# Patient Record
Sex: FEMALE | Race: BLACK OR AFRICAN AMERICAN | NOT HISPANIC OR LATINO | Employment: STUDENT | URBAN - METROPOLITAN AREA
[De-identification: names, ages, dates, MRNs, and addresses within clinical notes are randomized per-mention and may not be internally consistent; named-entity substitution may affect disease eponyms.]

---

## 2017-01-09 ENCOUNTER — PROCEDURE VISIT (OUTPATIENT)
Dept: OBSTETRICS AND GYNECOLOGY | Facility: CLINIC | Age: 22
End: 2017-01-09
Payer: MEDICAID

## 2017-01-09 ENCOUNTER — INITIAL PRENATAL (OUTPATIENT)
Dept: OBSTETRICS AND GYNECOLOGY | Facility: CLINIC | Age: 22
End: 2017-01-09
Payer: MEDICAID

## 2017-01-09 VITALS
SYSTOLIC BLOOD PRESSURE: 124 MMHG | WEIGHT: 270.31 LBS | DIASTOLIC BLOOD PRESSURE: 82 MMHG | BODY MASS INDEX: 43.62 KG/M2

## 2017-01-09 DIAGNOSIS — Z34.01 ENCOUNTER FOR SUPERVISION OF NORMAL FIRST PREGNANCY IN FIRST TRIMESTER: ICD-10-CM

## 2017-01-09 DIAGNOSIS — Z32.01 POSITIVE PREGNANCY TEST: ICD-10-CM

## 2017-01-09 DIAGNOSIS — N92.6 IRREGULAR MENSES: ICD-10-CM

## 2017-01-09 DIAGNOSIS — O21.9 NAUSEA/VOMITING IN PREGNANCY: ICD-10-CM

## 2017-01-09 DIAGNOSIS — O99.211 OBESITY COMPLICATING PREGNANCY, FIRST TRIMESTER: Primary | ICD-10-CM

## 2017-01-09 DIAGNOSIS — N94.9 FEMALE GENITAL SYMPTOMS: ICD-10-CM

## 2017-01-09 PROCEDURE — 99999 PR PBB SHADOW E&M-EST. PATIENT-LVL II: CPT | Mod: PBBFAC,,, | Performed by: ADVANCED PRACTICE MIDWIFE

## 2017-01-09 PROCEDURE — 99213 OFFICE O/P EST LOW 20 MIN: CPT | Mod: TH,S$PBB,, | Performed by: ADVANCED PRACTICE MIDWIFE

## 2017-01-09 PROCEDURE — 99212 OFFICE O/P EST SF 10 MIN: CPT | Mod: PBBFAC,PO | Performed by: ADVANCED PRACTICE MIDWIFE

## 2017-01-09 PROCEDURE — 76801 OB US < 14 WKS SINGLE FETUS: CPT | Mod: 26,S$PBB,, | Performed by: OBSTETRICS & GYNECOLOGY

## 2017-01-09 PROCEDURE — 87591 N.GONORRHOEAE DNA AMP PROB: CPT

## 2017-01-09 RX ORDER — PROMETHAZINE HYDROCHLORIDE 25 MG/1
25 TABLET ORAL EVERY 6 HOURS PRN
Qty: 30 TABLET | Refills: 0 | Status: SHIPPED | OUTPATIENT
Start: 2017-01-09 | End: 2017-03-29

## 2017-01-09 RX ORDER — ONDANSETRON 4 MG/1
4 TABLET, FILM COATED ORAL EVERY 12 HOURS PRN
Qty: 60 TABLET | Refills: 0 | Status: ON HOLD | OUTPATIENT
Start: 2017-01-09 | End: 2017-05-31 | Stop reason: HOSPADM

## 2017-01-09 RX ORDER — PROMETHAZINE HYDROCHLORIDE AND DEXTROMETHORPHAN HYDROBROMIDE 6.25; 15 MG/5ML; MG/5ML
5 SYRUP ORAL
COMMUNITY
Start: 2015-03-26 | End: 2017-02-13

## 2017-01-09 NOTE — PROGRESS NOTES
NOB s=d oriented to practice  Weight goal of 25 pounds set  Labs and sono reviewed  genprobe collected  reflll given for phenergan and zofran

## 2017-01-12 LAB
C TRACH DNA SPEC QL NAA+PROBE: NEGATIVE
N GONORRHOEA DNA SPEC QL NAA+PROBE: NEGATIVE

## 2017-02-06 ENCOUNTER — PATIENT MESSAGE (OUTPATIENT)
Dept: OBSTETRICS AND GYNECOLOGY | Facility: CLINIC | Age: 22
End: 2017-02-06

## 2017-02-13 ENCOUNTER — ROUTINE PRENATAL (OUTPATIENT)
Dept: OBSTETRICS AND GYNECOLOGY | Facility: CLINIC | Age: 22
End: 2017-02-13
Payer: MEDICAID

## 2017-02-13 VITALS
SYSTOLIC BLOOD PRESSURE: 120 MMHG | BODY MASS INDEX: 41.38 KG/M2 | WEIGHT: 256.38 LBS | DIASTOLIC BLOOD PRESSURE: 78 MMHG

## 2017-02-13 DIAGNOSIS — Z3A.14 14 WEEKS GESTATION OF PREGNANCY: Primary | ICD-10-CM

## 2017-02-13 DIAGNOSIS — O21.9 NAUSEA/VOMITING IN PREGNANCY: ICD-10-CM

## 2017-02-13 PROCEDURE — 99212 OFFICE O/P EST SF 10 MIN: CPT | Mod: PBBFAC,PO | Performed by: NURSE PRACTITIONER

## 2017-02-13 PROCEDURE — 99999 PR PBB SHADOW E&M-EST. PATIENT-LVL II: CPT | Mod: PBBFAC,,, | Performed by: NURSE PRACTITIONER

## 2017-02-13 PROCEDURE — 99214 OFFICE O/P EST MOD 30 MIN: CPT | Mod: TH,S$PBB,, | Performed by: NURSE PRACTITIONER

## 2017-02-13 NOTE — PROGRESS NOTES
For some reason not seeing her midwife will get her reappted, went to ER for nausea and vomiting. Has lost weight but with her obesity has tolerated it.  Is in great sprits, doing good on the zofran.  Urine with trace leukocytes, eating ice, encouraged to try and eat some solid foods and liquids. In with midwife asap.

## 2017-02-14 ENCOUNTER — ROUTINE PRENATAL (OUTPATIENT)
Dept: OBSTETRICS AND GYNECOLOGY | Facility: CLINIC | Age: 22
End: 2017-02-14
Payer: MEDICAID

## 2017-02-14 VITALS
BODY MASS INDEX: 41.13 KG/M2 | WEIGHT: 254.88 LBS | SYSTOLIC BLOOD PRESSURE: 118 MMHG | DIASTOLIC BLOOD PRESSURE: 68 MMHG

## 2017-02-14 DIAGNOSIS — K11.7 PTYALISM: ICD-10-CM

## 2017-02-14 DIAGNOSIS — Z34.80 SUPERVISION OF OTHER NORMAL PREGNANCY: Primary | ICD-10-CM

## 2017-02-14 DIAGNOSIS — O21.9 NAUSEA AND VOMITING IN PREGNANCY PRIOR TO 22 WEEKS GESTATION: ICD-10-CM

## 2017-02-14 PROCEDURE — 99999 PR PBB SHADOW E&M-EST. PATIENT-LVL II: CPT | Mod: PBBFAC,,, | Performed by: ADVANCED PRACTICE MIDWIFE

## 2017-02-14 PROCEDURE — 99212 OFFICE O/P EST SF 10 MIN: CPT | Mod: PBBFAC,PO | Performed by: ADVANCED PRACTICE MIDWIFE

## 2017-02-14 PROCEDURE — 99212 OFFICE O/P EST SF 10 MIN: CPT | Mod: TH,S$PBB,, | Performed by: ADVANCED PRACTICE MIDWIFE

## 2017-02-14 RX ORDER — ONDANSETRON 8 MG/1
8 TABLET, ORALLY DISINTEGRATING ORAL EVERY 12 HOURS PRN
Qty: 20 TABLET | Refills: 1 | Status: ON HOLD | OUTPATIENT
Start: 2017-02-14 | End: 2017-05-31 | Stop reason: HOSPADM

## 2017-02-14 RX ORDER — ONDANSETRON 8 MG/1
TABLET, ORALLY DISINTEGRATING ORAL
Refills: 0 | COMMUNITY
Start: 2017-02-08 | End: 2017-02-14

## 2017-02-14 NOTE — MR AVS SNAPSHOT
[facility]  [address] [facility] [address]  [place] [address]  Phone: [phone]  Fax: [phone]                  Juliet Jalloh   2017 3:00 PM   Routine Prenatal    Description:  Female : 1995   Provider:  Lizbeth Thomson CNM   Department:  [facility]           Reason for Visit     Routine Prenatal Visit           Diagnoses this Visit        Comments    Supervision of other normal pregnancy    -  Primary     Ptyalism         Nausea and vomiting in pregnancy prior to 22 weeks gestation                To Do List           Future Appointments        Provider Department Dept Phone    2017 2:15 PM Lizbeth Thomson CNM [facility]/ -[phone]    3/14/2017 1:45 PM Lizbeth Thomson CNM [facility]/ -[phone]      Goals (5 Years of Data)     None      Follow-Up and Disposition     Return in about 1 week (around 2017).    Follow-up and Disposition History       These Medications        Disp Refills Start End    ondansetron (ZOFRAN-ODT) 8 MG TbDL 20 tablet 1 2017     Take 1 tablet (8 mg total) by mouth every 12 (twelve) hours as needed. - Oral    [place] [facility] [address] [address] [facility] Ph #: [phone]       prenatal vit#103-iron-FA () 27 mg iron- 1 mg Tab 30 tablet 9 2017    Take 1 tablet by mouth once daily. - Oral    [place] [facility] [address] [address] [facility] [address] [facility] Ph #: [phone]       Notes to Pharmacy: Any generic may be used      [facility]     [facility] Care [facility] -  Assistance  Registered nurses in the [facility] Call Center provide clinical advisement, health education, appointment booking, and other advisory services.  Call for this free service at [phone].             Medications           Message regarding Medications     Verify the changes and/or additions to your medication regime listed below are the same as  discussed with your clinician today.  If any of these changes or additions are incorrect, please notify your healthcare provider.        START taking these NEW medications        Refills    ondansetron (ZOFRAN-ODT) 8 MG TbDL 1    Sig: Take 1 tablet (8 mg total) by mouth every 12 (twelve) hours as needed.    Class: Normal    Route: Oral    prenatal vit#103-iron-FA () 27 mg iron- 1 mg Tab 9    Sig: Take 1 tablet by mouth once daily.    Class: Normal    Route: Oral           Verify that the below list of medications is an accurate representation of the medications you are currently taking.  If none reported, the list may be blank. If incorrect, please contact your healthcare provider. Carry this list with you in case of emergency.           Current Medications     ondansetron (ZOFRAN) 4 MG tablet Take 1 tablet (4 mg total) by mouth every 12 (twelve) hours as needed.    ondansetron (ZOFRAN-ODT) 8 MG TbDL Take 1 tablet (8 mg total) by mouth every 12 (twelve) hours as needed.    prenatal vit#103-iron-FA () 27 mg iron- 1 mg Tab Take 1 tablet by mouth once daily.    promethazine (PHENERGAN) 25 MG tablet Take 1 tablet (25 mg total) by mouth every 6 (six) hours as needed for Nausea.           Clinical Reference Information           Prenatal Vitals     Enc. Date GA Prenatal Vitals Prenatal Pulse Pain Level Urine Albumin/Glucose Edema Presentation Dilation/Effacement/Station    2/14/17 14w1d 118/68 / 115.6 kg (254 lb 13.6 oz) 14 cm          2/13/17 14w0d 120/78 / 116.3 kg (256 lb 6.3 oz)  / 145  0 Trace / Negative       1/9/17 9w0d 124/82 / 122.6 kg (270 lb 4.5 oz) 9 cm / us 162 / Absent            Your Vitals Were     BP Weight Last Period BMI       118/68 115.6 kg (254 lb 13.6 oz) 11/07/2016 41.13 kg/m2       Allergies as of 2/14/2017     No Known Allergies      Immunizations Administered on Date of Encounter - 2/14/2017     None      Language Assistance Services     ATTENTION: Language assistance services are  available, free of charge. Please call [phone].      ATENCIÓN: Si habla gian, tiene a nieto disposición servicios gratuitos de asistencia lingüística. Llame al [phone].     CHÚ Ý: N?u b?n nói Ti?ng Vi?t, có các d?ch v? h? tr? ngôn ng? mi?n phí dành cho b?n. G?i s? [phone].         [facility] complies with applicable Federal civil rights laws and does not discriminate on the basis of race, color, national origin, age, disability, or sex.

## 2017-02-14 NOTE — PROGRESS NOTES
zofran 8 mg OTD helping encouraged to stop spitting and swallow saliva  26 pound weight loss  Encouraged to eat small frequent meals  Considering move to [place]

## 2017-03-21 ENCOUNTER — TELEPHONE (OUTPATIENT)
Dept: OBSTETRICS AND GYNECOLOGY | Facility: CLINIC | Age: 22
End: 2017-03-21

## 2017-03-21 DIAGNOSIS — Z34.92 NORMAL PREGNANCY IN SECOND TRIMESTER: Primary | ICD-10-CM

## 2017-03-21 NOTE — TELEPHONE ENCOUNTER
appt made for her 20 wks scan and appt with julse on 04/03/2017 at 730am at [facility]   tdf

## 2017-03-21 NOTE — TELEPHONE ENCOUNTER
----- Message from Lizbeth Liz sent at 3/21/2017 11:47 AM CDT -----  Contact: Pt   States she is calling rg wanting to get her ultrasound to see the gender of her baby and wants to schedule same time as her appt and can be reached at [phone]/fide/dbw

## 2017-03-28 ENCOUNTER — HOSPITAL ENCOUNTER (EMERGENCY)
Facility: HOSPITAL | Age: 22
Discharge: HOME | End: 2017-03-29
Payer: MEDICAID

## 2017-03-28 DIAGNOSIS — O21.1 HYPEREMESIS GRAVIDARUM BEFORE END OF 22 WEEK GESTATION, DEHYDRATION: Primary | ICD-10-CM

## 2017-03-28 LAB
ALBUMIN SERPL BCP-MCNC: 3 G/DL
ALP SERPL-CCNC: 110 U/L
ALT SERPL W/O P-5'-P-CCNC: 14 U/L
ANION GAP SERPL CALC-SCNC: 9 MMOL/L
AST SERPL-CCNC: 15 U/L
BILIRUB SERPL-MCNC: 0.3 MG/DL
BUN SERPL-MCNC: 4 MG/DL
CALCIUM SERPL-MCNC: 8.8 MG/DL
CHLORIDE SERPL-SCNC: 106 MMOL/L
CO2 SERPL-SCNC: 23 MMOL/L
CREAT SERPL-MCNC: 0.7 MG/DL
EST. GFR  (AFRICAN AMERICAN): >60 ML/MIN/1.73 M^2
EST. GFR  (NON AFRICAN AMERICAN): >60 ML/MIN/1.73 M^2
GLUCOSE SERPL-MCNC: 86 MG/DL
POTASSIUM SERPL-SCNC: 3.6 MMOL/L
PROT SERPL-MCNC: 7 G/DL
SODIUM SERPL-SCNC: 138 MMOL/L

## 2017-03-28 PROCEDURE — 99283 EMERGENCY DEPT VISIT LOW MDM: CPT | Mod: 25

## 2017-03-28 PROCEDURE — 80053 COMPREHEN METABOLIC PANEL: CPT

## 2017-03-28 PROCEDURE — 85025 COMPLETE CBC W/AUTO DIFF WBC: CPT

## 2017-03-28 PROCEDURE — 25000003 PHARM REV CODE 250: Performed by: NURSE PRACTITIONER

## 2017-03-28 PROCEDURE — 96365 THER/PROPH/DIAG IV INF INIT: CPT

## 2017-03-28 PROCEDURE — 63600175 PHARM REV CODE 636 W HCPCS: Performed by: NURSE PRACTITIONER

## 2017-03-28 RX ORDER — SCOLOPAMINE TRANSDERMAL SYSTEM 1 MG/1
1 PATCH, EXTENDED RELEASE TRANSDERMAL
Status: COMPLETED | OUTPATIENT
Start: 2017-03-28 | End: 2017-03-28

## 2017-03-28 RX ADMIN — SODIUM CHLORIDE 1000 ML: 0.9 INJECTION, SOLUTION INTRAVENOUS at 11:03

## 2017-03-28 RX ADMIN — SCOPALAMINE 1.5 MG: 1 PATCH, EXTENDED RELEASE TRANSDERMAL at 11:03

## 2017-03-28 RX ADMIN — PROMETHAZINE HYDROCHLORIDE 25 MG: 25 INJECTION, SOLUTION INTRAMUSCULAR; INTRAVENOUS at 11:03

## 2017-03-28 NOTE — ED AVS SNAPSHOT
[facility]  [address] [facility] [address]               Juliet Jalloh   3/28/2017 10:31 PM   ED    Description:  Female : 1995   Department:  [facility]            Your Care was Coordinated By:     Provider Role From To    Dhruv Hawley NP Nurse Practitioner 17 0004 --      Reason for Visit     Emesis           Diagnoses this Visit        Comments    Hyperemesis gravidarum before end of 22 week gestation, dehydration    -  Primary       ED Disposition     None           To Do List           Follow-up Information     Schedule an appointment as soon as possible for a visit with [facility].    Specialty:  Obstetrics and Gynecology    Contact information:    [address] [facility][1] [address] [phone]    Additional information:    (off [facility]) 3rd floor       These Medications        Disp Refills Start End    promethazine (PHENERGAN) 25 MG tablet 30 tablet 0 3/29/2017     Take 1 tablet (25 mg total) by mouth every 6 (six) hours as needed for Nausea. - Oral    [facility] [address] [place] [address] [address] AT [facility] #: [phone]       promethazine (PHENERGAN) 25 MG suppository 10 suppository 0 3/29/2017     Place 1 suppository (25 mg total) rectally every 6 (six) hours as needed for Nausea. - Rectal    [facility] [address] AT [facility] Ph #: [phone]       scopolamine (TRANSDERM-SCOP) 1.5 mg (1 mg over 3 days) 3 patch 0 3/29/2017 2017    Place 1 patch (1.5 mg total) onto the skin Every 3 (three) days. - Transdermal    [facility] [facility] [address] [facility] Ph #: [phone]         [facility]     [facility] On Call Nurse Care Line -  Assistance  Registered nurses in the [facility] provide clinical advisement, health  education, appointment booking, and other advisory services.  Call for this free service at [phone].             Medications           Message regarding Medications     Verify the changes and/or additions to your medication regime listed below are the same as discussed with your clinician today.  If any of these changes or additions are incorrect, please notify your healthcare provider.        START taking these NEW medications        Refills    promethazine (PHENERGAN) 25 MG suppository 0    Sig: Place 1 suppository (25 mg total) rectally every 6 (six) hours as needed for Nausea.    Class: Print    Route: Rectal    scopolamine (TRANSDERM-SCOP) 1.5 mg (1 mg over 3 days) 0    Sig: Place 1 patch (1.5 mg total) onto the skin Every 3 (three) days.    Class: Print    Route: Transdermal      These medications were administered today        Dose Freq    sodium chloride 0.9% bolus 1,000 mL 1,000 mL Once    Sig: Inject 1,000 mLs into the vein once.    Class: Normal    Route: Intravenous    promethazine (PHENERGAN) 25 mg in dextrose 5 % 50 mL IVPB 25 mg ED 1 Time    Sig: Inject 25 mg into the vein ED 1 Time.    Class: Normal    Route: Intravenous    scopolamine 1.5 mg (1 mg over 3 days) 1.5 mg 1 patch ED 1 Time    Sig: Place 1 patch (1.5 mg total) onto the skin ED 1 Time.    Class: Normal    Route: Transdermal           Verify that the below list of medications is an accurate representation of the medications you are currently taking.  If none reported, the list may be blank. If incorrect, please contact your healthcare provider. Carry this list with you in case of emergency.           Current Medications     ondansetron (ZOFRAN) 4 MG tablet Take 1 tablet (4 mg total) by mouth every 12 (twelve) hours as needed.    ondansetron (ZOFRAN-ODT) 8 MG TbDL Take 1 tablet (8 mg total) by mouth every 12 (twelve) hours as needed.    prenatal vit#103-iron-FA () 27 mg iron- 1 mg Tab Take 1 tablet by mouth once daily.     "promethazine (PHENERGAN) 25 MG suppository Place 1 suppository (25 mg total) rectally every 6 (six) hours as needed for Nausea.    promethazine (PHENERGAN) 25 MG tablet Take 1 tablet (25 mg total) by mouth every 6 (six) hours as needed for Nausea.    scopolamine (TRANSDERM-SCOP) 1.5 mg (1 mg over 3 days) Place 1 patch (1.5 mg total) onto the skin Every 3 (three) days.           Clinical Reference Information           Your Vitals Were     BP Pulse Temp Resp Height Weight    114/66 76 98.1 °F (36.7 °C) 16 5' 6" (1.676 m) 113.4 kg (250 lb)    Last Period SpO2 BMI          11/07/2016 100% 40.35 kg/m2        Allergies as of 3/29/2017     No Known Allergies      Immunizations Administered on Date of Encounter - 3/29/2017     None      ED Micro, Lab, POCT     Start Ordered       Status Ordering Provider    03/28/17 2235 03/28/17 2235  Comprehensive metabolic panel  STAT      Final result     03/28/17 2235 03/28/17 2235  CBC auto differential  STAT      Final result     03/28/17 2235 03/28/17 2235  Urinalysis - Clean Catch  STAT      Final result     03/28/17 2235 03/28/17 2235  Urinalysis Microscopic  Once      Final result       ED Imaging Orders     None        Discharge Instructions         Hyperemesis Gravidarum  Hyperemesis gravidarum is a severe form of morning sickness that can affect some women during pregnancy. It may develop around the 5th week and last until the 16th week of pregnancy. In some women, it may last longer. Symptoms include severe nausea and vomiting. This can lead to problems such as as weight loss and dehydration.  It is not clear what causes hyperemesis gravidarum. It may be due to rising hormone levels early in the pregnancy. It can be a serious threat to mother and fetus, if symptoms are severe. Therefore, follow the advice below carefully. If symptoms are not controlled by home measures, a hospital stay may be needed. IV (intravenous) fluids and medicines may be given.  Home " care  · Diet  ¨ Keep a log of the foods you eat and how they affect your symptoms. Avoid foods that trigger your symptoms.  ¨ Eat frequent small meals throughout the day rather than three large meals. This can help keep the stomach from being empty, which can make nausea worse.  ¨ Choose foods that are high in carbohydrates. Eating foods high in protein may also help. Limit greasy or spicy foods.  ¨ Before getting out of bed in the morning, try eating crackers or dry toast. This may help settle your stomach.  ¨ Drink cold, clear liquids. Drinking small amounts of liquids with electrolytes, such as sports drinks may help as well.  · Medicine  ¨ If needed, your healthcare provider may prescribe certain medicines to help relieve nausea and vomiting. Vitamin B6 and caitie may also be advised. Dont try any over-the-counter medicines or home remedies without talking to your provider first.  Follow-up care  Follow up with your healthcare provider, or as advised.  When to seek medical advice  Call your healthcare provider right away if any of these occur:  · Signs of dehydration (dry mouth, extreme thirst, dark urine or little urine output, dizziness, weakness, or fainting)  · Vomiting that wont stop  · Inability to keep down liquids  · Frequent diarrhea  · Weight loss or no weight gain over a 2-week period  · Severe constant pain in the lower right abdomen  · Fever of 100.4°F (38°C) or higher, or as directed by your provider  Date Last Reviewed: 8/20/2015  © 1566-5913 [employer]. [address] [place] [address], [place] [address]. All rights reserved. This information is not intended as a substitute for professional medical care. Always follow your healthcare professional's instructions.          Severe Morning Sickness (Hyperemesis Gravidarum)    Nausea and vomiting are common during pregnancy. It is often called morning sickness, but it can happen at any time of day. But severe nausea and vomiting that doesnt  let up is not normal. Dehydration and weight loss can result. This can be dangerous for the mother and baby. Hyperemesis gravidarum (HEG) is the medical term for severe nausea and vomiting during pregnancy, and it results in weight loss. If you have it, your healthcare provider can take steps to keep you and your baby safe. He or she can also help you find relief.   What are the symptoms of severe morning sickness?  Call your healthcare provider right away if you suspect that you have severe morning symptoms. The symptoms include:  · Inability to keep down liquids  · Nausea that is severe and lasts beyond the first few months  · Inability to empty the bladder   · Urine that is dark and concentrated   · Fainting spells  What causes severe morning symptoms?  Nausea and vomiting during pregnancy are thought to be due to an increase in certain hormone levels. It is not clear what causes severe morning sickness, but it may be more likely in women carrying twins or more. Your healthcare provider will do some tests to rule out certain health conditions that may lead to severe nausea and vomiting.  Getting relief from morning sickness  To help combat nausea, eat small amounts often. This helps prevent the stomach from being empty, which can make nausea worse. Choose dry foods such as crackers. Try sipping cold, clear drinks. And ask your healthcare provider about taking vitamin B6 or caitie to help ease nausea. Your provider may recommend that you try vitamin B6 and a medicine called doxylamine to relieve the nausea. In some cases, alternative treatments such as acupuncture are effective in helping managing nausea during pregnancy.  Treating severe morning sickness  The focus of treatment for severe morning sickness is to relieve symptoms and prevent weight loss and dehydration. If you are dehydrated or losing weight, steps are needed to protect you and your baby. You will most likely be admitted to the hospital for at  least a short time. There, you can be given IV fluids to rehydrate you. You may also be prescribed medicines that relieve nausea. In very severe cases, a longer hospitalization may be needed. IV nutrition or tube feeding will then be used. If this becomes necessary, your healthcare provider can tell you more.  Recovery and follow-up  With treatment, severe morning sickness can be managed. Follow up with your healthcare provider to be sure you are keeping down fluids and gaining a healthy amount of weight.  When to seek medical care  Contact your healthcare provider right away if you have any of the following:  · Signs of dehydration, including extreme thirst, headache, little urine, very dark urine, or a dry, sticky mouth.  · Weight loss  · Dizziness or fainting  · Racing or pounding heart  · Blood in your vomit   Date Last Reviewed: 5/1/2016  © 1217-0718 [employer]. [address] [place] [address]. All rights reserved. This information is not intended as a substitute for professional medical care. Always follow your healthcare professional's instructions.          Your Scheduled Appointments     Apr 03, 2017  7:30 AM CDT   Ultrasound with ULTRASOUND, OB-GYN KUNAL   [facility] ([facility])    [address] [place] [id number] [phone]            Apr 03, 2017  8:15 AM CDT   Routine Prenatal Visit with [facility] - [facility]/ [facility] ([facility])    [address] [place] [address] [phone]               [facility]  complies with applicable Federal civil rights laws and does not discriminate on the basis of race, color, national origin, age, disability, or sex.        Language Assistance Services     ATTENTION: Language assistance services are available, free of charge. Please call [phone].      ATENCIÓN: Si habla español, tiene a nieto disposición servicios gratuitos de asistencia lingüística. Llame al [phone].     GITA Ý: N?u b?n nói  Ti?ng Vi?t, có các d?ch v? h? tr? ngôn ng? mi?n phí dành cho b?n. G?i s? [phone].

## 2017-03-29 VITALS
BODY MASS INDEX: 40.18 KG/M2 | HEART RATE: 68 BPM | TEMPERATURE: 98 F | DIASTOLIC BLOOD PRESSURE: 64 MMHG | SYSTOLIC BLOOD PRESSURE: 112 MMHG | WEIGHT: 250 LBS | HEIGHT: 66 IN | OXYGEN SATURATION: 100 % | RESPIRATION RATE: 16 BRPM

## 2017-03-29 LAB
BACTERIA #/AREA URNS HPF: ABNORMAL /HPF
BASOPHILS # BLD AUTO: 0.03 K/UL
BASOPHILS NFR BLD: 0.5 %
BILIRUB UR QL STRIP: ABNORMAL
CLARITY UR: CLEAR
COLOR UR: YELLOW
DIFFERENTIAL METHOD: ABNORMAL
EOSINOPHIL # BLD AUTO: 0.2 K/UL
EOSINOPHIL NFR BLD: 3.5 %
ERYTHROCYTE [DISTWIDTH] IN BLOOD BY AUTOMATED COUNT: 12.8 %
GLUCOSE UR QL STRIP: NEGATIVE
HCT VFR BLD AUTO: 40 %
HGB BLD-MCNC: 13.9 G/DL
HGB UR QL STRIP: NEGATIVE
KETONES UR QL STRIP: ABNORMAL
LEUKOCYTE ESTERASE UR QL STRIP: ABNORMAL
LYMPHOCYTES # BLD AUTO: 2 K/UL
LYMPHOCYTES NFR BLD: 33.7 %
MCH RBC QN AUTO: 31.1 PG
MCHC RBC AUTO-ENTMCNC: 34.8 %
MCV RBC AUTO: 90 FL
MICROSCOPIC COMMENT: ABNORMAL
MONOCYTES # BLD AUTO: 0.6 K/UL
MONOCYTES NFR BLD: 9.6 %
NEUTROPHILS # BLD AUTO: 3.1 K/UL
NEUTROPHILS NFR BLD: 52.7 %
NITRITE UR QL STRIP: NEGATIVE
PH UR STRIP: 6 [PH] (ref 5–8)
PLATELET # BLD AUTO: 230 K/UL
PMV BLD AUTO: 10.1 FL
PROT UR QL STRIP: ABNORMAL
RBC # BLD AUTO: 4.47 M/UL
RBC #/AREA URNS HPF: 0 /HPF (ref 0–4)
SP GR UR STRIP: 1.02 (ref 1–1.03)
SQUAMOUS #/AREA URNS HPF: 28 /HPF
URN SPEC COLLECT METH UR: ABNORMAL
UROBILINOGEN UR STRIP-ACNC: ABNORMAL EU/DL
WBC # BLD AUTO: 5.96 K/UL
WBC #/AREA URNS HPF: 3 /HPF (ref 0–5)
YEAST URNS QL MICRO: ABNORMAL

## 2017-03-29 PROCEDURE — 81000 URINALYSIS NONAUTO W/SCOPE: CPT

## 2017-03-29 RX ORDER — SCOLOPAMINE TRANSDERMAL SYSTEM 1 MG/1
1 PATCH, EXTENDED RELEASE TRANSDERMAL
Qty: 3 PATCH | Refills: 0 | Status: SHIPPED | OUTPATIENT
Start: 2017-03-29 | End: 2017-04-05

## 2017-03-29 RX ORDER — PROMETHAZINE HYDROCHLORIDE 25 MG/1
25 TABLET ORAL EVERY 6 HOURS PRN
Qty: 30 TABLET | Refills: 0 | Status: SHIPPED | OUTPATIENT
Start: 2017-03-29 | End: 2017-04-03 | Stop reason: SDUPTHER

## 2017-03-29 RX ORDER — PROMETHAZINE HYDROCHLORIDE 25 MG/1
25 SUPPOSITORY RECTAL EVERY 6 HOURS PRN
Qty: 10 SUPPOSITORY | Refills: 0 | Status: SHIPPED | OUTPATIENT
Start: 2017-03-29 | End: 2017-04-03 | Stop reason: SDUPTHER

## 2017-03-29 NOTE — DISCHARGE INSTRUCTIONS
Hyperemesis Gravidarum  Hyperemesis gravidarum is a severe form of morning sickness that can affect some women during pregnancy. It may develop around the 5th week and last until the 16th week of pregnancy. In some women, it may last longer. Symptoms include severe nausea and vomiting. This can lead to problems such as as weight loss and dehydration.  It is not clear what causes hyperemesis gravidarum. It may be due to rising hormone levels early in the pregnancy. It can be a serious threat to mother and fetus, if symptoms are severe. Therefore, follow the advice below carefully. If symptoms are not controlled by home measures, a hospital stay may be needed. IV (intravenous) fluids and medicines may be given.  Home care  · Diet  ¨ Keep a log of the foods you eat and how they affect your symptoms. Avoid foods that trigger your symptoms.  ¨ Eat frequent small meals throughout the day rather than three large meals. This can help keep the stomach from being empty, which can make nausea worse.  ¨ Choose foods that are high in carbohydrates. Eating foods high in protein may also help. Limit greasy or spicy foods.  ¨ Before getting out of bed in the morning, try eating crackers or dry toast. This may help settle your stomach.  ¨ Drink cold, clear liquids. Drinking small amounts of liquids with electrolytes, such as sports drinks may help as well.  · Medicine  ¨ If needed, your healthcare provider may prescribe certain medicines to help relieve nausea and vomiting. Vitamin B6 and caitie may also be advised. Dont try any over-the-counter medicines or home remedies without talking to your provider first.  Follow-up care  Follow up with your healthcare provider, or as advised.  When to seek medical advice  Call your healthcare provider right away if any of these occur:  · Signs of dehydration (dry mouth, extreme thirst, dark urine or little urine output, dizziness, weakness, or fainting)  · Vomiting that wont  stop  · Inability to keep down liquids  · Frequent diarrhea  · Weight loss or no weight gain over a 2-week period  · Severe constant pain in the lower right abdomen  · Fever of 100.4°F (38°C) or higher, or as directed by your provider  Date Last Reviewed: 8/20/2015  © 8830-6201 [employer]. [address] [place] [address], [place] [address]. All rights reserved. This information is not intended as a substitute for professional medical care. Always follow your healthcare professional's instructions.          Severe Morning Sickness (Hyperemesis Gravidarum)    Nausea and vomiting are common during pregnancy. It is often called morning sickness, but it can happen at any time of day. But severe nausea and vomiting that doesnt let up is not normal. Dehydration and weight loss can result. This can be dangerous for the mother and baby. Hyperemesis gravidarum (HEG) is the medical term for severe nausea and vomiting during pregnancy, and it results in weight loss. If you have it, your healthcare provider can take steps to keep you and your baby safe. He or she can also help you find relief.   What are the symptoms of severe morning sickness?  Call your healthcare provider right away if you suspect that you have severe morning symptoms. The symptoms include:  · Inability to keep down liquids  · Nausea that is severe and lasts beyond the first few months  · Inability to empty the bladder   · Urine that is dark and concentrated   · Fainting spells  What causes severe morning symptoms?  Nausea and vomiting during pregnancy are thought to be due to an increase in certain hormone levels. It is not clear what causes severe morning sickness, but it may be more likely in women carrying twins or more. Your healthcare provider will do some tests to rule out certain health conditions that may lead to severe nausea and vomiting.  Getting relief from morning sickness  To help combat nausea, eat small amounts often. This helps  prevent the stomach from being empty, which can make nausea worse. Choose dry foods such as crackers. Try sipping cold, clear drinks. And ask your healthcare provider about taking vitamin B6 or caitie to help ease nausea. Your provider may recommend that you try vitamin B6 and a medicine called doxylamine to relieve the nausea. In some cases, alternative treatments such as acupuncture are effective in helping managing nausea during pregnancy.  Treating severe morning sickness  The focus of treatment for severe morning sickness is to relieve symptoms and prevent weight loss and dehydration. If you are dehydrated or losing weight, steps are needed to protect you and your baby. You will most likely be admitted to the hospital for at least a short time. There, you can be given IV fluids to rehydrate you. You may also be prescribed medicines that relieve nausea. In very severe cases, a longer hospitalization may be needed. IV nutrition or tube feeding will then be used. If this becomes necessary, your healthcare provider can tell you more.  Recovery and follow-up  With treatment, severe morning sickness can be managed. Follow up with your healthcare provider to be sure you are keeping down fluids and gaining a healthy amount of weight.  When to seek medical care  Contact your healthcare provider right away if you have any of the following:  · Signs of dehydration, including extreme thirst, headache, little urine, very dark urine, or a dry, sticky mouth.  · Weight loss  · Dizziness or fainting  · Racing or pounding heart  · Blood in your vomit   Date Last Reviewed: 5/1/2016  © 9329-6658 [employer]. [address] [place] [address], [place] [address]. All rights reserved. This information is not intended as a substitute for professional medical care. Always follow your healthcare professional's instructions.

## 2017-03-29 NOTE — ED PROVIDER NOTES
SCRIBE #1 NOTE: I, Epi Lemon, am scribing for, and in the presence of, ARELIS Ramirez. I have scribed the entire note.      History      Chief Complaint   Patient presents with    Emesis     pt states she has been vomiting and 20weeks pregnant       Review of patient's allergies indicates:  No Known Allergies     HPI   HPI    3/28/2017, 10:33 PM   History obtained from the patient      History of Present Illness: Juliet Jalloh is a 21 y.o. female patient who is 20 weeks gestation presents to the Emergency Department for emesis which onset gradually yesterday. Sx are episodic and moderate in severity. Pt states she has not been able to keep anything down today. There are no mitigating or exacerbating factors noted. Associated sx include nausea.  Pt denies any fever, chills, diarrhea, abd pain, abnormal vaginal bleeding/discharge, CP, SOB, urinary sx, and all other sx at this time. No further complaints or concerns at this time.         Arrival mode: Personal vehicle     PCP: Primary Doctor No       Past Medical History:  Past Medical History:   Diagnosis Date    Abnormal Pap smear of cervix     Herpes simplex virus (HSV) infection        Past Surgical History:  History reviewed. No pertinent surgical history.      Family History:  Family History   Problem Relation Age of Onset    Diabetes Maternal Grandmother     Breast cancer Maternal Grandmother     Colon cancer Maternal Grandfather     Cancer Neg Hx        Social History:  Social History     Social History Main Topics    Smoking status: Never Smoker    Smokeless tobacco: Never Used    Alcohol use No      Comment: social use     Drug use: No    Sexual activity: Yes     Partners: Male     Birth control/ protection: None       ROS   Review of Systems   Constitutional: Negative for chills and fever.   HENT: Negative for sore throat and trouble swallowing.    Respiratory: Negative for cough and shortness of breath.    Cardiovascular: Negative  "for chest pain.   Gastrointestinal: Positive for nausea and vomiting. Negative for abdominal pain and diarrhea.   Genitourinary: Negative for dysuria, hematuria, vaginal bleeding and vaginal discharge.   Musculoskeletal: Negative for back pain.   Skin: Negative for rash and wound.   Neurological: Negative for weakness and numbness.   Hematological: Does not bruise/bleed easily.   All other systems reviewed and are negative.      Physical Exam    Initial Vitals   BP Pulse Resp Temp SpO2   03/28/17 2033 03/28/17 2033 03/28/17 2033 03/28/17 2033 03/28/17 2033   131/74 94 18 99.6 °F (37.6 °C) 97 %      Physical Exam  Nursing Notes and Vital Signs Reviewed.  Constitutional: Patient is in moderate nauseated distress. Awake and alert. Well-developed and well-nourished.  Head: Atraumatic. Normocephalic.  Eyes: PERRL. EOM intact. Conjunctivae are not pale. No scleral icterus.  ENT: Mucous membranes are moist. Oropharynx is clear and symmetric.    Neck: Supple. Full ROM. No lymphadenopathy.  Cardiovascular: Regular rate. Regular rhythm. No murmurs, rubs, or gallops.    Pulmonary/Chest: No respiratory distress. Clear to auscultation bilaterally. No wheezing, rales, or rhonchi.  Abdominal: Soft and non-distended.  There is no tenderness.  No rebound, guarding, or rigidity.    Musculoskeletal: Moves all extremities. No obvious deformities. No edema.    Skin: Warm and dry.  Neurological:  Alert, awake, and appropriate.  Normal speech.  No acute focal neurological deficits are appreciated.  Psychiatric: Normal affect. Good eye contact. Appropriate in content.    ED Course    Procedures  ED Vital Signs:  Vitals:    03/28/17 2033 03/28/17 2340 03/29/17 0100   BP: 131/74 114/66 112/64   Pulse: 94 76 68   Resp: 18 16 16   Temp: 99.6 °F (37.6 °C) 98.1 °F (36.7 °C) 98 °F (36.7 °C)   TempSrc: Oral     SpO2: 97% 100% 100%   Weight: 113.4 kg (250 lb)     Height: 5' 6" (1.676 m)         Abnormal Lab Results:  Labs Reviewed   COMPREHENSIVE " METABOLIC PANEL - Abnormal; Notable for the following:        Result Value    BUN, Bld 4 (*)     Albumin 3.0 (*)     All other components within normal limits   CBC W/ AUTO DIFFERENTIAL - Abnormal; Notable for the following:     MCH 31.1 (*)     All other components within normal limits   URINALYSIS - Abnormal; Notable for the following:     Protein, UA Trace (*)     Ketones, UA 3+ (*)     Bilirubin (UA) 1+ (*)     Urobilinogen, UA 2.0-3.0 (*)     Leukocytes, UA 1+ (*)     All other components within normal limits   URINALYSIS MICROSCOPIC - Abnormal; Notable for the following:     Yeast, UA Rare (*)     All other components within normal limits        All Lab Results:  Results for orders placed or performed during the hospital encounter of 03/28/17   Comprehensive metabolic panel   Result Value Ref Range    Sodium 138 136 - 145 mmol/L    Potassium 3.6 3.5 - 5.1 mmol/L    Chloride 106 95 - 110 mmol/L    CO2 23 23 - 29 mmol/L    Glucose 86 70 - 110 mg/dL    BUN, Bld 4 (L) 6 - 20 mg/dL    Creatinine 0.7 0.5 - 1.4 mg/dL    Calcium 8.8 8.7 - 10.5 mg/dL    Total Protein 7.0 6.0 - 8.4 g/dL    Albumin 3.0 (L) 3.5 - 5.2 g/dL    Total Bilirubin 0.3 0.1 - 1.0 mg/dL    Alkaline Phosphatase 110 55 - 135 U/L    AST 15 10 - 40 U/L    ALT 14 10 - 44 U/L    Anion Gap 9 8 - 16 mmol/L    eGFR if African American >60 >60 mL/min/1.73 m^2    eGFR if non African American >60 >60 mL/min/1.73 m^2   CBC auto differential   Result Value Ref Range    WBC 5.96 3.90 - 12.70 K/uL    RBC 4.47 4.00 - 5.40 M/uL    Hemoglobin 13.9 12.0 - 16.0 g/dL    Hematocrit 40.0 37.0 - 48.5 %    MCV 90 82 - 98 fL    MCH 31.1 (H) 27.0 - 31.0 pg    MCHC 34.8 32.0 - 36.0 %    RDW 12.8 11.5 - 14.5 %    Platelets 230 150 - 350 K/uL    MPV 10.1 9.2 - 12.9 fL    Gran # 3.1 1.8 - 7.7 K/uL    Lymph # 2.0 1.0 - 4.8 K/uL    Mono # 0.6 0.3 - 1.0 K/uL    Eos # 0.2 0.0 - 0.5 K/uL    Baso # 0.03 0.00 - 0.20 K/uL    Gran% 52.7 38.0 - 73.0 %    Lymph% 33.7 18.0 - 48.0 %    Mono%  9.6 4.0 - 15.0 %    Eosinophil% 3.5 0.0 - 8.0 %    Basophil% 0.5 0.0 - 1.9 %    Differential Method Automated    Urinalysis - Clean Catch   Result Value Ref Range    Specimen UA Urine, Clean Catch     Color, UA Yellow Yellow, Straw, Thu    Appearance, UA Clear Clear    pH, UA 6.0 5.0 - 8.0    Specific Gravity, UA 1.025 1.005 - 1.030    Protein, UA Trace (A) Negative    Glucose, UA Negative Negative    Ketones, UA 3+ (A) Negative    Bilirubin (UA) 1+ (A) Negative    Occult Blood UA Negative Negative    Nitrite, UA Negative Negative    Urobilinogen, UA 2.0-3.0 (A) <2.0 EU/dL    Leukocytes, UA 1+ (A) Negative   Urinalysis Microscopic   Result Value Ref Range    RBC, UA 0 0 - 4 /hpf    WBC, UA 3 0 - 5 /hpf    Bacteria, UA Occasional None-Occ /hpf    Yeast, UA Rare (A) None    Squam Epithel, UA 28 /hpf    Microscopic Comment SEE COMMENT          Imaging Results:  Imaging Results     None             The Emergency Provider reviewed the vital signs and test results, which are outlined above.    ED Discussion     Patient tolerating PO.     Patient understands the risk and benefits of taking any medications during pregnancy and the affect it may have on fetus. Patient understands risk. Patient also understands the need to f/u with OB to discuss new RX medications.     I discussed with patient and/or family/caretaker that evaluation in the ED does not suggest any emergent or life threatening medical conditions requiring immediate intervention beyond what was provided in the ED, and I believe patient is safe for discharge. Regardless, an unremarkable evaluation in the ED does not preclude the development or presence of a serious of life threatening condition. As such, patient was instructed to return immediately for any worsening or change in current symptoms.       ED Medication(s):  Medications   sodium chloride 0.9% bolus 1,000 mL (0 mLs Intravenous Stopped 3/29/17 0005)   promethazine (PHENERGAN) 25 mg in dextrose 5 % 50  mL IVPB (0 mg Intravenous Stopped 3/28/17 8477)   scopolamine 1.5 mg (1 mg over 3 days) 1.5 mg (1.5 mg Transdermal Patch Applied 3/28/17 4260)       Discharge Medication List as of 3/29/2017 12:59 AM      START taking these medications    Details   promethazine (PHENERGAN) 25 MG suppository Place 1 suppository (25 mg total) rectally every 6 (six) hours as needed for Nausea., Starting 3/29/2017, Until Discontinued, Print      scopolamine (TRANSDERM-SCOP) 1.5 mg (1 mg over 3 days) Place 1 patch (1.5 mg total) onto the skin Every 3 (three) days., Starting 3/29/2017, Until Wed 4/5/17, Print             Follow-up Information     Schedule an appointment as soon as possible for a visit with [facility].    Specialty:  Obstetrics and Gynecology    Contact information:    [address] [place] [address] [phone]    Additional information:    (off [facility]) 3rd floor             Medical Decision Making              Scribe Attestation:   Scribe #1: I performed the above scribed service and the documentation accurately describes the services I performed. I attest to the accuracy of the note.    APC:   APC Attestation Statement for Scribe #1: I, Dhruv Hawley NP, personally performed the services described in this documentation, as scribed by Epi Lemon in my presence, and it is both accurate and complete.          Clinical Impression       ICD-10-CM ICD-9-CM   1. Hyperemesis gravidarum before end of 22 week gestation, dehydration O21.1 643.10               Dhruv Hawley NP  03/29/17 0233

## 2017-03-29 NOTE — ED NOTES
Pt reports mild improvement in nausea. Pt was asleep prior to being questioned about efficacy of medication, resp e/u, WNL. NP informed. Will CTM.

## 2017-03-29 NOTE — ED NOTES
Pt provided with CCMS cup. Instructed on need for sample. Pt informed to alert staff when urine sample available. Pt verbalizes understanding.

## 2017-04-03 ENCOUNTER — ROUTINE PRENATAL (OUTPATIENT)
Dept: OBSTETRICS AND GYNECOLOGY | Facility: CLINIC | Age: 22
End: 2017-04-03
Payer: MEDICAID

## 2017-04-03 ENCOUNTER — PROCEDURE VISIT (OUTPATIENT)
Dept: OBSTETRICS AND GYNECOLOGY | Facility: CLINIC | Age: 22
End: 2017-04-03
Payer: MEDICAID

## 2017-04-03 VITALS
BODY MASS INDEX: 41.99 KG/M2 | WEIGHT: 260.13 LBS | DIASTOLIC BLOOD PRESSURE: 80 MMHG | SYSTOLIC BLOOD PRESSURE: 122 MMHG

## 2017-04-03 DIAGNOSIS — Z34.92 NORMAL PREGNANCY IN SECOND TRIMESTER: ICD-10-CM

## 2017-04-03 DIAGNOSIS — Z34.80 SUPERVISION OF OTHER NORMAL PREGNANCY: Primary | ICD-10-CM

## 2017-04-03 DIAGNOSIS — Z34.92 NORMAL PREGNANCY IN SECOND TRIMESTER: Primary | ICD-10-CM

## 2017-04-03 PROCEDURE — 99999 PR PBB SHADOW E&M-EST. PATIENT-LVL II: CPT | Mod: PBBFAC,,, | Performed by: ADVANCED PRACTICE MIDWIFE

## 2017-04-03 PROCEDURE — 99212 OFFICE O/P EST SF 10 MIN: CPT | Mod: TH,S$PBB,, | Performed by: ADVANCED PRACTICE MIDWIFE

## 2017-04-03 PROCEDURE — 76805 OB US >/= 14 WKS SNGL FETUS: CPT | Mod: 26,S$PBB,, | Performed by: OBSTETRICS & GYNECOLOGY

## 2017-04-03 PROCEDURE — 76805 OB US >/= 14 WKS SNGL FETUS: CPT | Mod: PBBFAC,PO | Performed by: OBSTETRICS & GYNECOLOGY

## 2017-04-03 RX ORDER — PROMETHAZINE HYDROCHLORIDE 25 MG/1
25 SUPPOSITORY RECTAL EVERY 6 HOURS PRN
Qty: 10 SUPPOSITORY | Refills: 1 | Status: SHIPPED | OUTPATIENT
Start: 2017-04-03 | End: 2017-04-07 | Stop reason: SDUPTHER

## 2017-04-03 NOTE — PROGRESS NOTES
Nausea improving  sono done anatomy complete  Coffective counseling sheet Get Ready discussed with mother. Reinforced avoiding induction of labor unless medically indicated as well as comfort measures during labor.  Encouraged mother to download Coffective mobile lit if she has not already done so. Mother verbalizes understanding.  Refill phenergan

## 2017-04-07 ENCOUNTER — TELEPHONE (OUTPATIENT)
Dept: OBSTETRICS AND GYNECOLOGY | Facility: CLINIC | Age: 22
End: 2017-04-07

## 2017-04-07 RX ORDER — PROMETHAZINE HYDROCHLORIDE 25 MG/1
25 SUPPOSITORY RECTAL EVERY 6 HOURS PRN
Qty: 10 SUPPOSITORY | Refills: 1 | Status: SHIPPED | OUTPATIENT
Start: 2017-04-07 | End: 2017-04-24 | Stop reason: SDUPTHER

## 2017-04-07 NOTE — TELEPHONE ENCOUNTER
Pt Rx for Phenergan suppository was not sent to her pharmacy on 4/3 it was printed instead.  Can you send this to [facility] on [address] [address] [facility]?

## 2017-04-07 NOTE — TELEPHONE ENCOUNTER
----- Message from Giabe Smith sent at 4/7/2017  1:38 PM CDT -----  Contact: pt  Pt states suppositories weren't called in to her pharmacy. She can be reached at [phone] (home)             [facility] [place] [id number] - [place] - [address] AT [facility]  [address] [address]  Phone: [phone] Fax: [phone]

## 2017-04-25 ENCOUNTER — PATIENT MESSAGE (OUTPATIENT)
Dept: OBSTETRICS AND GYNECOLOGY | Facility: CLINIC | Age: 22
End: 2017-04-25

## 2017-04-25 RX ORDER — PROMETHAZINE HYDROCHLORIDE 25 MG/1
25 SUPPOSITORY RECTAL EVERY 6 HOURS PRN
Qty: 10 SUPPOSITORY | Refills: 1 | Status: SHIPPED | OUTPATIENT
Start: 2017-04-25 | End: 2017-05-08 | Stop reason: SDUPTHER

## 2017-04-25 NOTE — TELEPHONE ENCOUNTER
----- Message from Ava Belem sent at 4/25/2017  2:38 PM CDT -----  Pt at [phone]//states she is needing a med refilled//Promethezine//uses//[facility]//please call//thanks/pradip

## 2017-05-08 ENCOUNTER — PATIENT MESSAGE (OUTPATIENT)
Dept: OBSTETRICS AND GYNECOLOGY | Facility: CLINIC | Age: 22
End: 2017-05-08

## 2017-05-08 RX ORDER — PROMETHAZINE HYDROCHLORIDE 25 MG/1
25 SUPPOSITORY RECTAL EVERY 6 HOURS PRN
Qty: 10 SUPPOSITORY | Refills: 1 | Status: SHIPPED | OUTPATIENT
Start: 2017-05-08 | End: 2017-06-17 | Stop reason: SDUPTHER

## 2017-05-09 ENCOUNTER — ROUTINE PRENATAL (OUTPATIENT)
Dept: OBSTETRICS AND GYNECOLOGY | Facility: CLINIC | Age: 22
End: 2017-05-09
Payer: MEDICAID

## 2017-05-09 VITALS
BODY MASS INDEX: 40.89 KG/M2 | DIASTOLIC BLOOD PRESSURE: 62 MMHG | SYSTOLIC BLOOD PRESSURE: 116 MMHG | WEIGHT: 253.31 LBS

## 2017-05-09 DIAGNOSIS — O99.212 OBESITY AFFECTING PREGNANCY IN SECOND TRIMESTER: ICD-10-CM

## 2017-05-09 PROCEDURE — 99212 OFFICE O/P EST SF 10 MIN: CPT | Mod: TH,S$PBB,, | Performed by: ADVANCED PRACTICE MIDWIFE

## 2017-05-09 PROCEDURE — 99999 PR PBB SHADOW E&M-EST. PATIENT-LVL II: CPT | Mod: PBBFAC,,, | Performed by: ADVANCED PRACTICE MIDWIFE

## 2017-05-09 PROCEDURE — 99212 OFFICE O/P EST SF 10 MIN: CPT | Mod: PBBFAC,PO | Performed by: ADVANCED PRACTICE MIDWIFE

## 2017-05-09 NOTE — MR AVS SNAPSHOT
[facility]/ [facility]  [address] [facility] [place] [address]  Phone: [phone]  Fax: [phone]                  Juliet Bravos   2017 11:00 AM   Routine Prenatal    Description:  Female : 1995   Provider:  Lizbeth Thomson CNM   Department:  [facility]/ [facility]           Reason for Visit     Vaginal Pain     Routine Prenatal Visit           Diagnoses this Visit        Comments    Obesity affecting pregnancy in second trimester                To Do List           Future Appointments        Provider Department Dept Phone    5/10/2017 11:35 AM LABORATORY, [facility] [phone]    2017 2:30 PM Lizbeth Thomson CNM [facility] OB/ -[phone]      Goals (5 Years of Data)     None      Follow-Up and Disposition     Return in about 2 weeks (around 2017).    Follow-up and Disposition History      [facility]     [facility] -  Assistance  Unless otherwise directed by your provider, please contact [facility], our nurse care line that is available for  assistance.     Registered nurses in the [facility] provide: appointment scheduling, clinical advisement, health education, and other advisory services.  Call: [phone] (toll free)               Medications           Message regarding Medications     Verify the changes and/or additions to your medication regime listed below are the same as discussed with your clinician today.  If any of these changes or additions are incorrect, please notify your healthcare provider.             Verify that the below list of medications is an accurate representation of the medications you are currently taking.  If none reported, the list may be blank. If incorrect, please contact your healthcare provider. Carry this list with you in case of emergency.           Current Medications     ondansetron (ZOFRAN) 4 MG tablet Take 1 tablet (4 mg total) by mouth every 12 (twelve) hours as  needed.    prenatal vit#103-iron-FA () 27 mg iron- 1 mg Tab Take 1 tablet by mouth once daily.    ondansetron (ZOFRAN-ODT) 8 MG TbDL Take 1 tablet (8 mg total) by mouth every 12 (twelve) hours as needed.    promethazine (PHENERGAN) 25 MG suppository Place 1 suppository (25 mg total) rectally every 6 (six) hours as needed for Nausea.           Clinical Reference Information           Prenatal Vitals     Enc. Date GA Prenatal Vitals Prenatal Pulse Pain Level Urine Albumin/Glucose Edema Presentation Dilation/Effacement/Station    5/9/17 26w1d 116/62 / 114.9 kg (253 lb 4.9 oz) 26 cm / 114.0kg / Present    None / None      4/3/17 21w0d 122/80 / 118 kg (260 lb 2.3 oz)  / us 153 / Present          2/14/17 14w1d 118/68 / 115.6 kg (254 lb 13.6 oz) 14 cm          2/13/17 14w0d 120/78 / 116.3 kg (256 lb 6.3 oz)  / 145  0 Trace / Negative       1/9/17 9w0d 124/82 / 122.6 kg (270 lb 4.5 oz) 9 cm / us 162 / Absent             TWG: -9.1 kg (-20 lb 1 oz)   Pregravid weight: 124 kg (273 lb 5.9 oz)       Your Vitals Were     BP Weight Last Period BMI       116/62 114.9 kg (253 lb 4.9 oz) 11/07/2016 40.89 kg/m2       Allergies as of 5/9/2017     No Known Allergies      Immunizations Administered on Date of Encounter - 5/9/2017     None      Orders Placed During Today's Visit     Future Labs/Procedures Expected by Expires    CBC auto differential  5/9/2017 5/9/2018    HIV-1 and HIV-2 antibodies  5/9/2017 7/8/2018    OB Glucose Screen  5/9/2017 7/8/2018    RPR  5/9/2017 5/9/2018      Language Assistance Services     ATTENTION: Language assistance services are available, free of charge. Please call [phone].      ATENCIÓN: Si habla español, tiene a nieto disposición servicios gratuitos de asistencia lingüística. Llame al [phone].     GITA Ý: N?u b?n nói Ti?ng Vi?t, có các d?ch v? h? tr? ngôn ng? mi?n phí dành cho b?n. G?i s? [phone].         [facility] complies with applicable Federal civil rights laws and  does not discriminate on the basis of race, color, national origin, age, disability, or sex.

## 2017-05-09 NOTE — PROGRESS NOTES
28 week labs next visit  Coffective counseling sheet Fall In Love discussed with mother. Reinforced immediate skin to skin, the magic first hour, importance of the first feeding and delaying routine procedures. Encouraged mother to download Coffective mobile lit if she has not already done so. Mother verbalizes understanding.

## 2017-05-10 ENCOUNTER — PATIENT MESSAGE (OUTPATIENT)
Dept: OBSTETRICS AND GYNECOLOGY | Facility: CLINIC | Age: 22
End: 2017-05-10

## 2017-05-10 ENCOUNTER — LAB VISIT (OUTPATIENT)
Dept: LAB | Facility: HOSPITAL | Age: 22
End: 2017-05-10
Attending: ADVANCED PRACTICE MIDWIFE
Payer: MEDICAID

## 2017-05-10 DIAGNOSIS — O99.212 OBESITY AFFECTING PREGNANCY IN SECOND TRIMESTER: ICD-10-CM

## 2017-05-10 LAB
BASOPHILS # BLD AUTO: 0.02 K/UL
BASOPHILS NFR BLD: 0.2 %
DIFFERENTIAL METHOD: ABNORMAL
EOSINOPHIL # BLD AUTO: 0.2 K/UL
EOSINOPHIL NFR BLD: 2.2 %
ERYTHROCYTE [DISTWIDTH] IN BLOOD BY AUTOMATED COUNT: 13.2 %
GLUCOSE SERPL-MCNC: 102 MG/DL
HCT VFR BLD AUTO: 30.7 %
HGB BLD-MCNC: 10.1 G/DL
LYMPHOCYTES # BLD AUTO: 1.8 K/UL
LYMPHOCYTES NFR BLD: 20.1 %
MCH RBC QN AUTO: 28.9 PG
MCHC RBC AUTO-ENTMCNC: 32.9 %
MCV RBC AUTO: 88 FL
MONOCYTES # BLD AUTO: 0.3 K/UL
MONOCYTES NFR BLD: 3.8 %
NEUTROPHILS # BLD AUTO: 6.5 K/UL
NEUTROPHILS NFR BLD: 73.7 %
PLATELET # BLD AUTO: 319 K/UL
PMV BLD AUTO: 9.3 FL
RBC # BLD AUTO: 3.49 M/UL
WBC # BLD AUTO: 8.89 K/UL

## 2017-05-10 PROCEDURE — 86592 SYPHILIS TEST NON-TREP QUAL: CPT

## 2017-05-10 PROCEDURE — 86703 HIV-1/HIV-2 1 RESULT ANTBDY: CPT

## 2017-05-10 PROCEDURE — 85025 COMPLETE CBC W/AUTO DIFF WBC: CPT | Mod: PO

## 2017-05-10 PROCEDURE — 36415 COLL VENOUS BLD VENIPUNCTURE: CPT | Mod: PO

## 2017-05-10 PROCEDURE — 82950 GLUCOSE TEST: CPT | Mod: PO

## 2017-05-11 LAB
HIV 1+2 AB+HIV1 P24 AG SERPL QL IA: NEGATIVE
RPR SER QL: NORMAL

## 2017-05-17 ENCOUNTER — HOSPITAL ENCOUNTER (EMERGENCY)
Facility: HOSPITAL | Age: 22
Discharge: HOME | End: 2017-05-17
Payer: MEDICAID

## 2017-05-17 VITALS
HEART RATE: 94 BPM | WEIGHT: 250 LBS | RESPIRATION RATE: 20 BRPM | OXYGEN SATURATION: 100 % | HEIGHT: 66 IN | SYSTOLIC BLOOD PRESSURE: 174 MMHG | BODY MASS INDEX: 40.18 KG/M2 | DIASTOLIC BLOOD PRESSURE: 69 MMHG | TEMPERATURE: 99 F

## 2017-05-17 DIAGNOSIS — J02.9 SORE THROAT: ICD-10-CM

## 2017-05-17 DIAGNOSIS — B34.9 VIRAL SYNDROME: ICD-10-CM

## 2017-05-17 DIAGNOSIS — R11.10 VOMITING, INTRACTABILITY OF VOMITING NOT SPECIFIED, PRESENCE OF NAUSEA NOT SPECIFIED, UNSPECIFIED VOMITING TYPE: Primary | ICD-10-CM

## 2017-05-17 LAB
ALBUMIN SERPL BCP-MCNC: 3 G/DL
ALP SERPL-CCNC: 128 U/L
ALT SERPL W/O P-5'-P-CCNC: 14 U/L
ANION GAP SERPL CALC-SCNC: 10 MMOL/L
AST SERPL-CCNC: 17 U/L
BACTERIA #/AREA URNS HPF: ABNORMAL /HPF
BASOPHILS # BLD AUTO: 0.01 K/UL
BASOPHILS NFR BLD: 0.1 %
BILIRUB SERPL-MCNC: 0.5 MG/DL
BILIRUB UR QL STRIP: ABNORMAL
BUN SERPL-MCNC: 5 MG/DL
CALCIUM SERPL-MCNC: 8.8 MG/DL
CHLORIDE SERPL-SCNC: 104 MMOL/L
CLARITY UR: CLEAR
CO2 SERPL-SCNC: 21 MMOL/L
COLOR UR: YELLOW
CREAT SERPL-MCNC: 0.7 MG/DL
DEPRECATED S PYO AG THROAT QL EIA: NEGATIVE
DIFFERENTIAL METHOD: ABNORMAL
EOSINOPHIL # BLD AUTO: 0 K/UL
EOSINOPHIL NFR BLD: 0.4 %
ERYTHROCYTE [DISTWIDTH] IN BLOOD BY AUTOMATED COUNT: 13.1 %
EST. GFR  (AFRICAN AMERICAN): >60 ML/MIN/1.73 M^2
EST. GFR  (NON AFRICAN AMERICAN): >60 ML/MIN/1.73 M^2
GLUCOSE SERPL-MCNC: 114 MG/DL
GLUCOSE UR QL STRIP: NEGATIVE
HCT VFR BLD AUTO: 30.5 %
HGB BLD-MCNC: 10.2 G/DL
HGB UR QL STRIP: NEGATIVE
HYALINE CASTS #/AREA URNS LPF: 0 /LPF
KETONES UR QL STRIP: ABNORMAL
LEUKOCYTE ESTERASE UR QL STRIP: ABNORMAL
LYMPHOCYTES # BLD AUTO: 1.1 K/UL
LYMPHOCYTES NFR BLD: 12.4 %
MCH RBC QN AUTO: 28.8 PG
MCHC RBC AUTO-ENTMCNC: 33.4 %
MCV RBC AUTO: 86 FL
MICROSCOPIC COMMENT: ABNORMAL
MONOCYTES # BLD AUTO: 0.5 K/UL
MONOCYTES NFR BLD: 5 %
NEUTROPHILS # BLD AUTO: 7.5 K/UL
NEUTROPHILS NFR BLD: 82.1 %
NITRITE UR QL STRIP: NEGATIVE
PH UR STRIP: >8 [PH] (ref 5–8)
PLATELET # BLD AUTO: 338 K/UL
PMV BLD AUTO: 9.3 FL
POTASSIUM SERPL-SCNC: 3.5 MMOL/L
PROT SERPL-MCNC: 7.4 G/DL
PROT UR QL STRIP: ABNORMAL
RBC # BLD AUTO: 3.54 M/UL
RBC #/AREA URNS HPF: 2 /HPF (ref 0–4)
SODIUM SERPL-SCNC: 135 MMOL/L
SP GR UR STRIP: 1.01 (ref 1–1.03)
URN SPEC COLLECT METH UR: ABNORMAL
UROBILINOGEN UR STRIP-ACNC: ABNORMAL EU/DL
WBC # BLD AUTO: 9.11 K/UL
WBC #/AREA URNS HPF: 5 /HPF (ref 0–5)

## 2017-05-17 PROCEDURE — 96360 HYDRATION IV INFUSION INIT: CPT

## 2017-05-17 PROCEDURE — 85025 COMPLETE CBC W/AUTO DIFF WBC: CPT

## 2017-05-17 PROCEDURE — 96361 HYDRATE IV INFUSION ADD-ON: CPT

## 2017-05-17 PROCEDURE — 80053 COMPREHEN METABOLIC PANEL: CPT

## 2017-05-17 PROCEDURE — 87081 CULTURE SCREEN ONLY: CPT

## 2017-05-17 PROCEDURE — 81000 URINALYSIS NONAUTO W/SCOPE: CPT

## 2017-05-17 PROCEDURE — 87880 STREP A ASSAY W/OPTIC: CPT

## 2017-05-17 PROCEDURE — 99283 EMERGENCY DEPT VISIT LOW MDM: CPT | Mod: 25

## 2017-05-17 PROCEDURE — 63600175 PHARM REV CODE 636 W HCPCS: Performed by: PHYSICIAN ASSISTANT

## 2017-05-17 PROCEDURE — 25000003 PHARM REV CODE 250: Performed by: PHYSICIAN ASSISTANT

## 2017-05-17 RX ORDER — ACETAMINOPHEN 500 MG
1000 TABLET ORAL
Status: COMPLETED | OUTPATIENT
Start: 2017-05-17 | End: 2017-05-17

## 2017-05-17 RX ORDER — METOCLOPRAMIDE HYDROCHLORIDE 5 MG/ML
10 INJECTION INTRAMUSCULAR; INTRAVENOUS
Status: DISCONTINUED | OUTPATIENT
Start: 2017-05-17 | End: 2017-05-17

## 2017-05-17 RX ORDER — PROMETHAZINE HYDROCHLORIDE 25 MG/1
25 TABLET ORAL
Status: COMPLETED | OUTPATIENT
Start: 2017-05-17 | End: 2017-05-17

## 2017-05-17 RX ADMIN — ACETAMINOPHEN 1000 MG: 500 TABLET ORAL at 01:05

## 2017-05-17 RX ADMIN — SODIUM CHLORIDE 1000 ML: 0.9 INJECTION, SOLUTION INTRAVENOUS at 01:05

## 2017-05-17 RX ADMIN — PROMETHAZINE HYDROCHLORIDE 25 MG: 25 TABLET ORAL at 03:05

## 2017-05-17 NOTE — ED AVS SNAPSHOT
[facility]  [address] [address]               Juliet Lopes Shubham   2017 12:41 PM   ED    Description:  Female : 1995   Department:  [facility]            Your Care was Coordinated By:     Provider Role From To    Kasia Cuevas PA-C Physician Assistant [phone] --      Reason for Visit     Sore Throat           Diagnoses this Visit        Comments    Vomiting, intractability of vomiting not specified, presence of nausea not specified, unspecified vomiting type    -  Primary     Viral syndrome         Sore throat           ED Disposition     ED Disposition Condition Comment    Discharge             To Do List           Follow-up Information     Follow up with [facility] In 2 days.    Specialty:  Obstetrics and Gynecology    Contact information:    [address]  [facility] [address] [phone]    Additional information:    (off [facility]) 4th floor, Please check in for your appointment in the Women's Services Department located through the doorway to the left of the elevators.      [facility]     [facility] - 24/ Assistance  Unless otherwise directed by your provider, please contact [facility], our nurse care line that is available for 24/ assistance.     Registered nurses in the [facility] provide: appointment scheduling, clinical advisement, health education, and other advisory services.  Call: [phone] (toll free)               Medications           Message regarding Medications     Verify the changes and/or additions to your medication regime listed below are the same as discussed with your clinician today.  If any of these changes or additions are incorrect, please notify your healthcare provider.        These medications were administered today        Dose Freq    sodium chloride 0.9% bolus 1,000 mL 1,000 mL ED 1 Time    Sig: Inject 1,000  "mLs into the vein ED 1 Time.    Class: Normal    Route: Intravenous    Cosign for Ordering: Required by Cristy Bajwa, DO    acetaminophen tablet 1,000 mg 1,000 mg ED 1 Time    Sig: Take 2 tablets (1,000 mg total) by mouth ED 1 Time.    Class: Normal    Route: Oral    Cosign for Ordering: Required by Cristy Bajwa,     promethazine tablet 25 mg 25 mg ED 1 Time    Sig: Take 1 tablet (25 mg total) by mouth ED 1 Time.    Class: Normal    Route: Oral    Cosign for Ordering: Required by Cristy Bajwa, DO           Verify that the below list of medications is an accurate representation of the medications you are currently taking.  If none reported, the list may be blank. If incorrect, please contact your healthcare provider. Carry this list with you in case of emergency.           Current Medications     ondansetron (ZOFRAN) 4 MG tablet Take 1 tablet (4 mg total) by mouth every 12 (twelve) hours as needed.    ondansetron (ZOFRAN-ODT) 8 MG TbDL Take 1 tablet (8 mg total) by mouth every 12 (twelve) hours as needed.    prenatal vit#103-iron-FA () 27 mg iron- 1 mg Tab Take 1 tablet by mouth once daily.    promethazine (PHENERGAN) 25 MG suppository Place 1 suppository (25 mg total) rectally every 6 (six) hours as needed for Nausea.           Clinical Reference Information           Your Vitals Were     BP Pulse Temp Resp Height Weight    174/69 (BP Location: Right arm, Patient Position: Sitting, BP Method: Automatic) 94 98.6 °F (37 °C) 20 5' 6" (1.676 m) 113.4 kg (250 lb)    Last Period SpO2 BMI          11/07/2016 100% 40.35 kg/m2        Allergies as of 5/17/2017     No Known Allergies      Immunizations Administered on Date of Encounter - 5/17/2017     None      ED Micro, Lab, POCT     Start Ordered       Status Ordering Provider    05/17/17 1251 05/17/17 1251  Strep A culture, throat  Once      In process     05/17/17 1251 05/17/17 1251  Urinalysis Microscopic  Once      Final result     05/17/17 1250 " 05/17/17 1251  CBC auto differential  STAT      Final result     05/17/17 1250 05/17/17 1251  Comprehensive metabolic panel  STAT      Final result     05/17/17 1250 05/17/17 1251  Urinalysis  STAT      Final result     05/17/17 1249 05/17/17 1251  Rapid strep screen  STAT      Final result       ED Imaging Orders     None      Discharge References/Attachments     BACK PAIN DURING PREGNANCY: POSITIONING YOURSELF (ENGLISH)    HYPEREMESIS GRAVIDARUM (ENGLISH)      Your Scheduled Appointments     Jun 06, 2017  2:30 PM CDT   Routine Prenatal Visit with Lizbeth Thomson CNM   [facility] ([facility])    [address] [place] [address]   [phone]               [facility] -  complies with applicable Federal civil rights laws and does not discriminate on the basis of race, color, national origin, age, disability, or sex.        Language Assistance Services     ATTENTION: Language assistance services are available, free of charge. Please call [phone].      ATENCIÓN: Si habla español, tiene a nieto disposición servicios gratuitos de asistencia lingüística. Llame al [phone].     CHÚ Ý: N?u b?n nói Ti?ng Vi?t, có các d?ch v? h? tr? ngôn ng? mi?n phí davidh cho b?n. G?i s? [phone].

## 2017-05-17 NOTE — ED PROVIDER NOTES
History      Chief Complaint   Patient presents with    Sore Throat     multiple complaints back ache sore throat bodyaches vomiting 28 weeks pregnant       Review of patient's allergies indicates:  No Known Allergies     HPI   HPI    5/17/2017, 12:54 PM   History obtained from the patient      History of Present Illness: Juliet Jalloh is a 21 y.o. female patient who presents to the Emergency Department for sore throat, low back pain, and vomiting since yesterday.  She denies abd pain, vag bleeding, fever.  She says she did keep down some milk today but feels dehydrated.  Denies antipyretics today.   Denies saddle anesthesia, bowel/bladder dysfunction or focal weakness.  Symptoms are moderate in severity.     No further complaints or concerns at this time.           PCP: Primary Doctor No       Past Medical History:  Past Medical History:   Diagnosis Date    Abnormal Pap smear of cervix     Herpes simplex virus (HSV) infection          Past Surgical History:  History reviewed. No pertinent surgical history.        Family History:  Family History   Problem Relation Age of Onset    Diabetes Maternal Grandmother     Breast cancer Maternal Grandmother     Colon cancer Maternal Grandfather     Cancer Neg Hx            Social History:  Social History     Social History Main Topics    Smoking status: Never Smoker    Smokeless tobacco: Never Used    Alcohol use No      Comment: social use     Drug use: No    Sexual activity: Yes     Partners: Male     Birth control/ protection: None       ROS     Review of Systems   Constitutional: Negative for fever.   HENT: Positive for sore throat. Negative for trouble swallowing.    Respiratory: Negative for shortness of breath.    Cardiovascular: Negative for chest pain.   Gastrointestinal: Positive for nausea and vomiting.   Genitourinary: Negative for dysuria, pelvic pain, vaginal bleeding and vaginal discharge.   Musculoskeletal: Negative for back pain and  "neck stiffness.   Skin: Negative for rash.   Neurological: Negative for weakness.   Hematological: Does not bruise/bleed easily.   All other systems reviewed and are negative.      Physical Exam      Initial Vitals   BP Pulse Resp Temp SpO2   05/17/17 1240 05/17/17 1240 05/17/17 1240 05/17/17 1240 05/17/17 1240   124/66 109 20 98.6 °F (37 °C) 98 %     Physical Exam  Vital signs and nursing notes reviewed.  Constitutional: Patient is in NAD. Awake and alert. Well-developed and well-nourished.  Head: Atraumatic. Normocephalic.  Eyes: PERRL. EOM intact. Conjunctivae nl. No scleral icterus.  ENT: Mucous membranes are moist. Mild tonsillar erythema, no exudates or palatal petechia.  Neck: Supple. No JVD. No lymphadenopathy.  No meningismus  Cardiovascular: Regular rate and rhythm. No murmurs, rubs, or gallops. Distal pulses are 2+ and symmetric.  Pulmonary/Chest: No respiratory distress. Clear to auscultation bilaterally. No wheezing, rales, or rhonchi.  Abdominal: Soft. Non-distended. No TTP. No rebound, guarding, or rigidity. Good bowel sounds.  Genitourinary: No CVA tenderness  Musculoskeletal: Moves all extremities. No edema.   Skin: Warm and dry.  Neurological: Awake and alert. No acute focal neurological deficits are appreciated.  Strong and equal dorsiflexion and plantar flexion.  Psychiatric: Normal affect. Good eye contact. Appropriate in content.      ED Course          Procedures  ED Vital Signs:  Vitals:    05/17/17 1240 05/17/17 1507   BP: 124/66 (!) 174/69   Pulse: 109 94   Resp: 20    Temp: 98.6 °F (37 °C)    SpO2: 98% 100%   Weight: 113.4 kg (250 lb)    Height: 5' 6" (1.676 m)          Results for orders placed or performed during the hospital encounter of 05/17/17   Rapid strep screen   Result Value Ref Range    Rapid Strep A Screen Negative Negative   CBC auto differential   Result Value Ref Range    WBC 9.11 3.90 - 12.70 K/uL    RBC 3.54 (L) 4.00 - 5.40 M/uL    Hemoglobin 10.2 (L) 12.0 - 16.0 g/dL    " Hematocrit 30.5 (L) 37.0 - 48.5 %    MCV 86 82 - 98 fL    MCH 28.8 27.0 - 31.0 pg    MCHC 33.4 32.0 - 36.0 %    RDW 13.1 11.5 - 14.5 %    Platelets 338 150 - 350 K/uL    MPV 9.3 9.2 - 12.9 fL    Gran # 7.5 1.8 - 7.7 K/uL    Lymph # 1.1 1.0 - 4.8 K/uL    Mono # 0.5 0.3 - 1.0 K/uL    Eos # 0.0 0.0 - 0.5 K/uL    Baso # 0.01 0.00 - 0.20 K/uL    Gran% 82.1 (H) 38.0 - 73.0 %    Lymph% 12.4 (L) 18.0 - 48.0 %    Mono% 5.0 4.0 - 15.0 %    Eosinophil% 0.4 0.0 - 8.0 %    Basophil% 0.1 0.0 - 1.9 %    Differential Method Automated    Comprehensive metabolic panel   Result Value Ref Range    Sodium 135 (L) 136 - 145 mmol/L    Potassium 3.5 3.5 - 5.1 mmol/L    Chloride 104 95 - 110 mmol/L    CO2 21 (L) 23 - 29 mmol/L    Glucose 114 (H) 70 - 110 mg/dL    BUN, Bld 5 (L) 6 - 20 mg/dL    Creatinine 0.7 0.5 - 1.4 mg/dL    Calcium 8.8 8.7 - 10.5 mg/dL    Total Protein 7.4 6.0 - 8.4 g/dL    Albumin 3.0 (L) 3.5 - 5.2 g/dL    Total Bilirubin 0.5 0.1 - 1.0 mg/dL    Alkaline Phosphatase 128 55 - 135 U/L    AST 17 10 - 40 U/L    ALT 14 10 - 44 U/L    Anion Gap 10 8 - 16 mmol/L    eGFR if African American >60 >60 mL/min/1.73 m^2    eGFR if non African American >60 >60 mL/min/1.73 m^2   Urinalysis   Result Value Ref Range    Specimen UA Urine, Clean Catch     Color, UA Yellow Yellow, Straw, Thu    Appearance, UA Clear Clear    pH, UA >8.0 (A) 5.0 - 8.0    Specific Gravity, UA 1.015 1.005 - 1.030    Protein, UA 1+ (A) Negative    Glucose, UA Negative Negative    Ketones, UA 1+ (A) Negative    Bilirubin (UA) 1+ (A) Negative    Occult Blood UA Negative Negative    Nitrite, UA Negative Negative    Urobilinogen, UA 2.0-3.0 (A) <2.0 EU/dL    Leukocytes, UA 3+ (A) Negative   Urinalysis Microscopic   Result Value Ref Range    RBC, UA 2 0 - 4 /hpf    WBC, UA 5 0 - 5 /hpf    Bacteria, UA Few (A) None-Occ /hpf    Hyaline Casts, UA 0 0-1/lpf /lpf    Microscopic Comment SEE COMMENT              Imaging Results:  Imaging Results     None             The  Emergency Provider reviewed the vital signs and test results, which are outlined above.    ED Discussion             Medication(s) given in the ER:  Medications   sodium chloride 0.9% bolus 1,000 mL (0 mLs Intravenous Stopped 5/17/17 1503)   acetaminophen tablet 1,000 mg (1,000 mg Oral Given 5/17/17 1323)   promethazine tablet 25 mg (25 mg Oral Given 5/17/17 1541)           Follow-up Information     Follow up with [facility] - [facility]/ GYN In 2 days.    Specialty:  Obstetrics and Gynecology    Contact information:    [address]  [facility] [address] [phone]    Additional information:    (off [facility]) 4th floor, Please check in for your appointment in the Women's Services Department located through the doorway to the left of the elevators.              New Prescriptions    No medications on file          Medical Decision Making      Patient tolerating PO challenge well.  Repeat abd exam:  Still nontender, no rebound.  Normal bowel sounds x 4.  Pt says feeling better and ready for dc  Fetal heart tones 152  All findings were reviewed with the patient/family in detail.   All remaining questions and concerns were addressed at that time.  Patient/family has been counseled regarding the need for follow-up as well as the indication to return to the emergency room should new or worrisome developments occur.        MDM               Clinical Impression:        ICD-10-CM ICD-9-CM   1. Vomiting, intractability of vomiting not specified, presence of nausea not specified, unspecified vomiting type R11.10 787.03   2. Viral syndrome B34.9 079.99   3. Sore throat J02.9 462             Kasia Cuevas PA-C  05/17/17 1616

## 2017-05-20 LAB — BACTERIA THROAT CULT: NORMAL

## 2017-05-30 ENCOUNTER — TELEPHONE (OUTPATIENT)
Dept: OBSTETRICS AND GYNECOLOGY | Facility: CLINIC | Age: 22
End: 2017-05-30

## 2017-05-30 NOTE — TELEPHONE ENCOUNTER
----- Message from Amina Watkins sent at 5/30/2017 12:25 PM CDT -----  Contact: pt  Pt request to have a US when she comes in for her 6/6 appt, pt can be reached at [phone]///[id number]

## 2017-05-31 ENCOUNTER — HOSPITAL ENCOUNTER (OUTPATIENT)
Facility: HOSPITAL | Age: 22
Discharge: HOME | End: 2017-05-31
Attending: SPECIALIST | Admitting: OBSTETRICS & GYNECOLOGY
Payer: MEDICAID

## 2017-05-31 VITALS
OXYGEN SATURATION: 98 % | HEART RATE: 95 BPM | DIASTOLIC BLOOD PRESSURE: 68 MMHG | TEMPERATURE: 99 F | SYSTOLIC BLOOD PRESSURE: 113 MMHG | RESPIRATION RATE: 18 BRPM

## 2017-05-31 DIAGNOSIS — R42 DIZZINESS: ICD-10-CM

## 2017-05-31 DIAGNOSIS — I95.1 ORTHOSTASIS: Primary | ICD-10-CM

## 2017-05-31 LAB
ALBUMIN SERPL BCP-MCNC: 2.7 G/DL
ALP SERPL-CCNC: 116 U/L
ALT SERPL W/O P-5'-P-CCNC: 12 U/L
AMPHET+METHAMPHET UR QL: NEGATIVE
ANION GAP SERPL CALC-SCNC: 10 MMOL/L
AST SERPL-CCNC: 17 U/L
BACTERIA #/AREA URNS HPF: ABNORMAL /HPF
BARBITURATES UR QL SCN>200 NG/ML: NEGATIVE
BASOPHILS # BLD AUTO: 0.01 K/UL
BASOPHILS NFR BLD: 0.1 %
BENZODIAZ UR QL SCN>200 NG/ML: NEGATIVE
BILIRUB SERPL-MCNC: 0.3 MG/DL
BILIRUB UR QL STRIP: NEGATIVE
BUN SERPL-MCNC: 6 MG/DL
BZE UR QL SCN: NEGATIVE
CALCIUM SERPL-MCNC: 8.6 MG/DL
CANNABINOIDS UR QL SCN: NORMAL
CHLORIDE SERPL-SCNC: 104 MMOL/L
CLARITY UR: CLEAR
CO2 SERPL-SCNC: 23 MMOL/L
COLOR UR: YELLOW
CREAT SERPL-MCNC: 0.7 MG/DL
CREAT UR-MCNC: 235.2 MG/DL
DIFFERENTIAL METHOD: ABNORMAL
EOSINOPHIL # BLD AUTO: 0 K/UL
EOSINOPHIL NFR BLD: 0.5 %
ERYTHROCYTE [DISTWIDTH] IN BLOOD BY AUTOMATED COUNT: 13.3 %
EST. GFR  (AFRICAN AMERICAN): >60 ML/MIN/1.73 M^2
EST. GFR  (NON AFRICAN AMERICAN): >60 ML/MIN/1.73 M^2
GLUCOSE SERPL-MCNC: 91 MG/DL
GLUCOSE UR QL STRIP: NEGATIVE
HCT VFR BLD AUTO: 29.5 %
HGB BLD-MCNC: 9.8 G/DL
HGB UR QL STRIP: NEGATIVE
HYALINE CASTS #/AREA URNS LPF: 0 /LPF
KETONES UR QL STRIP: NEGATIVE
LEUKOCYTE ESTERASE UR QL STRIP: ABNORMAL
LYMPHOCYTES # BLD AUTO: 1.6 K/UL
LYMPHOCYTES NFR BLD: 20.9 %
MCH RBC QN AUTO: 28.7 PG
MCHC RBC AUTO-ENTMCNC: 33.2 %
MCV RBC AUTO: 87 FL
METHADONE UR QL SCN>300 NG/ML: NEGATIVE
MICROSCOPIC COMMENT: ABNORMAL
MONOCYTES # BLD AUTO: 0.5 K/UL
MONOCYTES NFR BLD: 6.5 %
NEUTROPHILS # BLD AUTO: 5.6 K/UL
NEUTROPHILS NFR BLD: 72 %
NITRITE UR QL STRIP: NEGATIVE
OPIATES UR QL SCN: NEGATIVE
PCP UR QL SCN>25 NG/ML: NEGATIVE
PH UR STRIP: >8 [PH] (ref 5–8)
PLATELET # BLD AUTO: 336 K/UL
PMV BLD AUTO: 9.3 FL
POTASSIUM SERPL-SCNC: 3.6 MMOL/L
PROT SERPL-MCNC: 7 G/DL
PROT UR QL STRIP: ABNORMAL
RBC # BLD AUTO: 3.41 M/UL
RBC #/AREA URNS HPF: 5 /HPF (ref 0–4)
SODIUM SERPL-SCNC: 137 MMOL/L
SP GR UR STRIP: 1.01 (ref 1–1.03)
TOXICOLOGY INFORMATION: NORMAL
TROPONIN I SERPL DL<=0.01 NG/ML-MCNC: <0.006 NG/ML
URN SPEC COLLECT METH UR: ABNORMAL
UROBILINOGEN UR STRIP-ACNC: 1 EU/DL
WBC # BLD AUTO: 7.72 K/UL
WBC #/AREA URNS HPF: 15 /HPF (ref 0–5)

## 2017-05-31 PROCEDURE — 25000003 PHARM REV CODE 250: Performed by: ADVANCED PRACTICE MIDWIFE

## 2017-05-31 PROCEDURE — G0378 HOSPITAL OBSERVATION PER HR: HCPCS

## 2017-05-31 PROCEDURE — 85025 COMPLETE CBC W/AUTO DIFF WBC: CPT

## 2017-05-31 PROCEDURE — 80307 DRUG TEST PRSMV CHEM ANLYZR: CPT

## 2017-05-31 PROCEDURE — 99211 OFF/OP EST MAY X REQ PHY/QHP: CPT | Mod: 25

## 2017-05-31 PROCEDURE — 96360 HYDRATION IV INFUSION INIT: CPT

## 2017-05-31 PROCEDURE — 99285 EMERGENCY DEPT VISIT HI MDM: CPT | Mod: 25,27

## 2017-05-31 PROCEDURE — 96361 HYDRATE IV INFUSION ADD-ON: CPT

## 2017-05-31 PROCEDURE — 93010 ELECTROCARDIOGRAM REPORT: CPT | Mod: ,,, | Performed by: INTERNAL MEDICINE

## 2017-05-31 PROCEDURE — 84484 ASSAY OF TROPONIN QUANT: CPT

## 2017-05-31 PROCEDURE — 81000 URINALYSIS NONAUTO W/SCOPE: CPT

## 2017-05-31 PROCEDURE — 99213 OFFICE O/P EST LOW 20 MIN: CPT | Mod: 25,TH,, | Performed by: ADVANCED PRACTICE MIDWIFE

## 2017-05-31 PROCEDURE — 80053 COMPREHEN METABOLIC PANEL: CPT

## 2017-05-31 PROCEDURE — 25000003 PHARM REV CODE 250: Performed by: SPECIALIST

## 2017-05-31 PROCEDURE — 93005 ELECTROCARDIOGRAM TRACING: CPT

## 2017-05-31 PROCEDURE — 59025 FETAL NON-STRESS TEST: CPT | Mod: 26,,, | Performed by: ADVANCED PRACTICE MIDWIFE

## 2017-05-31 PROCEDURE — 59025 FETAL NON-STRESS TEST: CPT

## 2017-05-31 RX ORDER — ACETAMINOPHEN 500 MG
500 TABLET ORAL EVERY 6 HOURS PRN
Status: DISCONTINUED | OUTPATIENT
Start: 2017-05-31 | End: 2017-05-31 | Stop reason: HOSPADM

## 2017-05-31 RX ORDER — ONDANSETRON 8 MG/1
8 TABLET, ORALLY DISINTEGRATING ORAL EVERY 8 HOURS PRN
Status: DISCONTINUED | OUTPATIENT
Start: 2017-05-31 | End: 2017-05-31 | Stop reason: HOSPADM

## 2017-05-31 RX ORDER — ONDANSETRON 8 MG/1
8 TABLET, ORALLY DISINTEGRATING ORAL ONCE
Status: COMPLETED | OUTPATIENT
Start: 2017-05-31 | End: 2017-05-31

## 2017-05-31 RX ADMIN — SODIUM CHLORIDE, SODIUM LACTATE, POTASSIUM CHLORIDE, AND CALCIUM CHLORIDE 1000 ML: .6; .31; .03; .02 INJECTION, SOLUTION INTRAVENOUS at 03:05

## 2017-05-31 RX ADMIN — SODIUM CHLORIDE 1000 ML: 0.9 INJECTION, SOLUTION INTRAVENOUS at 12:05

## 2017-05-31 RX ADMIN — ONDANSETRON 8 MG: 8 TABLET, ORALLY DISINTEGRATING ORAL at 03:05

## 2017-05-31 NOTE — SUBJECTIVE & OBJECTIVE
Obstetric HPI:None  Patient reports  contractions, active fetal movement, No vaginal bleeding , No loss of fluid     This pregnancy has been complicated by obesity    Obstetric History       T0      L0     SAB0   TAB0   Ectopic0   Multiple0   Live Births0       # Outcome Date GA Lbr Goldy/2nd Weight Sex Delivery Anes PTL Lv   1 Current                 Past Medical History:   Diagnosis Date    Abnormal Pap smear of cervix     Herpes simplex virus (HSV) infection      History reviewed. No pertinent surgical history.    PTA Medications   Medication Sig    ondansetron (ZOFRAN) 4 MG tablet Take 1 tablet (4 mg total) by mouth every 12 (twelve) hours as needed.    ondansetron (ZOFRAN-ODT) 8 MG TbDL Take 1 tablet (8 mg total) by mouth every 12 (twelve) hours as needed.    prenatal vit#103-iron-FA () 27 mg iron- 1 mg Tab Take 1 tablet by mouth once daily.    promethazine (PHENERGAN) 25 MG suppository Place 1 suppository (25 mg total) rectally every 6 (six) hours as needed for Nausea.       Review of patient's allergies indicates:  No Known Allergies     Family History     Problem Relation (Age of Onset)    Breast cancer Maternal Grandmother    Colon cancer Maternal Grandfather    Diabetes Maternal Grandmother        Social History Main Topics    Smoking status: Never Smoker    Smokeless tobacco: Never Used    Alcohol use No      Comment: social use     Drug use: No    Sexual activity: Yes     Partners: Male     Birth control/ protection: None     Review of Systems   Constitutional: Negative.    HENT: Negative.    Eyes: Negative.    Respiratory: Negative.    Cardiovascular: Negative.    Gastrointestinal: Negative.    Endocrine: Negative.    Genitourinary: Negative.    Musculoskeletal: Negative.    Skin:  Negative.   Neurological: Positive for syncope.   Hematological: Negative.    Psychiatric/Behavioral: Negative.    Breast: negative.    All other systems reviewed and are negative.     Objective:      Vital Signs (Most Recent):  Temp: 98.8 °F (37.1 °C) (05/31/17 1147)  Pulse: 83 (05/31/17 1406)  Resp: 18 (05/31/17 1147)  BP: (!) 142/52 (05/31/17 1406)  SpO2: 98 % (05/31/17 1149) Vital Signs (24h Range):  Temp:  [98.8 °F (37.1 °C)] 98.8 °F (37.1 °C)  Pulse:  [83-95] 83  Resp:  [18] 18  SpO2:  [98 %] 98 %  BP: (103-142)/(52-73) 142/52        There is no height or weight on file to calculate BMI.    FHT: Cat 1 (reassuring)  TOCO:  Q 60 minutes    Physical Exam:   Constitutional: She is oriented to person, place, and time. She appears well-developed and well-nourished.     Eyes: Conjunctivae are normal. Pupils are equal, round, and reactive to light.    Neck: Normal range of motion.    Cardiovascular: Normal rate and regular rhythm.     Pulmonary/Chest: Breath sounds normal.        Abdominal: Soft.     Genitourinary: Vagina normal and uterus normal.           Musculoskeletal: Normal range of motion and moves all extremeties.       Neurological: She is alert and oriented to person, place, and time.    Skin: Skin is warm.    Psychiatric: She has a normal mood and affect. Her behavior is normal. Thought content normal.       Cervix:  Dilation:    Effacement:    Station:   Presentation:      Significant Labs:  Lab Results   Component Value Date    GROUPTRH O POS 12/16/2016    HEPBSAG Negative 12/16/2016       I have personallly reviewed all pertinent lab results from the last 24 hours.

## 2017-05-31 NOTE — ED NOTES
Patient brought to restroom to obtain urine sample, patient states she is unable to void at this time.

## 2017-05-31 NOTE — ED PROVIDER NOTES
"SCRIBE #1 NOTE: I, Brent Sam, am scribing for, and in the presence of, Vanessa Rojas MD. I have scribed the entire note.      History      Chief Complaint   Patient presents with    Dizziness     pt complaining of weakness and dizziness.  29 weeks pregnant       Review of patient's allergies indicates:  No Known Allergies     HPI   HPI    5/31/2017, 11:57 AM   History obtained from the patient      History of Present Illness: Juliet Jalloh is a 21 y.o. female patient, who is 29 wks gestation, presents to the Emergency Department for dizziness which onset gradually this AM while the pt was at work. Symptoms are constant and moderate in severity.  No mitigating or exacerbating factors reported. Pt states that she was standing up when the sxs started. Pt reports that she "felt like she was going to pass out, and that everything was going dark." Associated sxs include generalized weakness and lightheadedness. Patient denies LOC, HA, facial drooping, CP, SOB,  leg pain/swelling, cough, n/v/d, and all other sxs at this time. No further complaints or concerns at this time.         Arrival mode: Personal vehicle    PCP: Primary Doctor No       Past Medical History:  Past Medical History:   Diagnosis Date    Abnormal Pap smear of cervix     Herpes simplex virus (HSV) infection        Past Surgical History:  History reviewed. No pertinent surgical history.      Family History:  Family History   Problem Relation Age of Onset    Diabetes Maternal Grandmother     Breast cancer Maternal Grandmother     Colon cancer Maternal Grandfather     Cancer Neg Hx        Social History:  Social History     Social History Main Topics    Smoking status: Never Smoker    Smokeless tobacco: Never Used    Alcohol use No      Comment: social use     Drug use: No    Sexual activity: Yes     Partners: Male     Birth control/ protection: None       ROS   Review of Systems   Constitutional: Negative for fever.        + " generalized weakness   HENT: Negative for sore throat.    Respiratory: Negative for cough and shortness of breath.    Cardiovascular: Negative for chest pain, palpitations and leg swelling.   Gastrointestinal: Negative for abdominal pain, blood in stool, constipation, diarrhea, nausea and vomiting.   Genitourinary: Negative for difficulty urinating, dysuria, frequency and hematuria.   Musculoskeletal: Negative for back pain and joint swelling.        - leg pain/swelling   Skin: Negative for rash.   Neurological: Positive for dizziness and light-headedness. Negative for seizures, syncope, facial asymmetry, weakness, numbness and headaches.   Hematological: Does not bruise/bleed easily.   All other systems reviewed and are negative.      Physical Exam      Initial Vitals   BP Pulse Resp Temp SpO2   05/31/17 1147 05/31/17 1149 05/31/17 1147 05/31/17 1147 05/31/17 1149   112/72 90 18 98.8 °F (37.1 °C) 98 %      Physical Exam  Nursing Notes and Vital Signs Reviewed.  Constitutional: Patient is in no apparent distress. Well-developed and well-nourished.  Head: Atraumatic. Normocephalic.  Eyes: PERRL. EOM intact. Conjunctivae are not pale. No scleral icterus.  ENT: Mucous membranes are moist. Oropharynx is clear and symmetric.    Neck: Supple. Full ROM. No lymphadenopathy.  Cardiovascular: Regular rate. Regular rhythm. No murmurs, rubs, or gallops. Distal pulses are 2+ and symmetric.  Pulmonary/Chest: No respiratory distress. Clear to auscultation bilaterally. No wheezing, rales, or rhonchi.  Abdominal: Soft and non-distended.  Gravid abdomen.There is no tenderness.  No rebound, guarding, or rigidity. Good bowel sounds.  Genitourinary: No CVA tenderness  Musculoskeletal: Moves all extremities. No obvious deformities. No edema. No calf tenderness.  Skin: Warm and dry.  Neurological: Patient is alert and oriented to person, place and time. Pupils ERRL and EOM normal. Cranial nerves II-XII are intact. Strength is full  bilaterally; it is equal and 5/5 in bilateral upper and lower extremities. There is no pronator drift of outstretched arms. Light touch sense is intact. Speech is clear and normal. No acute focal neurological deficits noted.  Psychiatric: Normal affect. Good eye contact. Appropriate in content.    ED Course    Procedures  ED Vital Signs:  Vitals:    05/31/17 1147 05/31/17 1149 05/31/17 1215 05/31/17 1218   BP: 112/72   103/72   Pulse:  90 94 94   Resp: 18      Temp: 98.8 °F (37.1 °C)      SpO2:  98%      05/31/17 1219 05/31/17 1404 05/31/17 1405 05/31/17 1406   BP: 116/72 129/73 135/69 (!) 142/52   Pulse: 95 90 87 83   Resp:       Temp:       SpO2:           Abnormal Lab Results:  Labs Reviewed   CBC W/ AUTO DIFFERENTIAL - Abnormal; Notable for the following:        Result Value    RBC 3.41 (*)     Hemoglobin 9.8 (*)     Hematocrit 29.5 (*)     All other components within normal limits   COMPREHENSIVE METABOLIC PANEL - Abnormal; Notable for the following:     Calcium 8.6 (*)     Albumin 2.7 (*)     All other components within normal limits   URINALYSIS - Abnormal; Notable for the following:     pH, UA >8.0 (*)     Protein, UA 1+ (*)     Leukocytes, UA 3+ (*)     All other components within normal limits   TROPONIN I   DRUG SCREEN PANEL, URINE EMERGENCY   URINALYSIS MICROSCOPIC        All Lab Results:  Results for orders placed or performed during the hospital encounter of 05/31/17   CBC auto differential   Result Value Ref Range    WBC 7.72 3.90 - 12.70 K/uL    RBC 3.41 (L) 4.00 - 5.40 M/uL    Hemoglobin 9.8 (L) 12.0 - 16.0 g/dL    Hematocrit 29.5 (L) 37.0 - 48.5 %    MCV 87 82 - 98 fL    MCH 28.7 27.0 - 31.0 pg    MCHC 33.2 32.0 - 36.0 %    RDW 13.3 11.5 - 14.5 %    Platelets 336 150 - 350 K/uL    MPV 9.3 9.2 - 12.9 fL    Gran # 5.6 1.8 - 7.7 K/uL    Lymph # 1.6 1.0 - 4.8 K/uL    Mono # 0.5 0.3 - 1.0 K/uL    Eos # 0.0 0.0 - 0.5 K/uL    Baso # 0.01 0.00 - 0.20 K/uL    Gran% 72.0 38.0 - 73.0 %    Lymph% 20.9 18.0 -  48.0 %    Mono% 6.5 4.0 - 15.0 %    Eosinophil% 0.5 0.0 - 8.0 %    Basophil% 0.1 0.0 - 1.9 %    Differential Method Automated    Comprehensive metabolic panel   Result Value Ref Range    Sodium 137 136 - 145 mmol/L    Potassium 3.6 3.5 - 5.1 mmol/L    Chloride 104 95 - 110 mmol/L    CO2 23 23 - 29 mmol/L    Glucose 91 70 - 110 mg/dL    BUN, Bld 6 6 - 20 mg/dL    Creatinine 0.7 0.5 - 1.4 mg/dL    Calcium 8.6 (L) 8.7 - 10.5 mg/dL    Total Protein 7.0 6.0 - 8.4 g/dL    Albumin 2.7 (L) 3.5 - 5.2 g/dL    Total Bilirubin 0.3 0.1 - 1.0 mg/dL    Alkaline Phosphatase 116 55 - 135 U/L    AST 17 10 - 40 U/L    ALT 12 10 - 44 U/L    Anion Gap 10 8 - 16 mmol/L    eGFR if African American >60 >60 mL/min/1.73 m^2    eGFR if non African American >60 >60 mL/min/1.73 m^2   Troponin I   Result Value Ref Range    Troponin I <0.006 0.000 - 0.026 ng/mL   Urinalysis   Result Value Ref Range    Specimen UA Urine, Clean Catch     Color, UA Yellow Yellow, Straw, Thu    Appearance, UA Clear Clear    pH, UA >8.0 (A) 5.0 - 8.0    Specific Gravity, UA 1.015 1.005 - 1.030    Protein, UA 1+ (A) Negative    Glucose, UA Negative Negative    Ketones, UA Negative Negative    Bilirubin (UA) Negative Negative    Occult Blood UA Negative Negative    Nitrite, UA Negative Negative    Urobilinogen, UA 1.0 <2.0 EU/dL    Leukocytes, UA 3+ (A) Negative         Imaging Results:  Imaging Results          X-Ray Chest 1 View (Final result)  Result time 05/31/17 12:44:03    Final result by Akil Mayo MD (05/31/17 12:44:03)                 Impression:     Negative      Electronically signed by: AKIL MAYO MD  Date:     05/31/17  Time:    12:44              Narrative:    History: Weakness, shortness of breath    Normal heart size. Clear lungs.                                  The EKG was ordered, reviewed, and independently interpreted by the ED provider.  Interpretation time: 12:10  Rate: 82 BPM  Rhythm: normal sinus rhythm  Interpretation: Non  specific T wave abnormality. Abnormal ECG. No STEMI.        The Emergency Provider reviewed the vital signs and test results, which are outlined above.    ED Discussion     2:12 PM: Discussed case with Dr. Galdamez (OBGYN). Dr. Galdamez agrees with current care and management of pt and accepts admission.   Admitting Service: OBGYN  Admitting Physician: Dr. Galdamez  Admit to: obs    2:13 PM: Re-evaluated pt. I have discussed test results, shared treatment plan, and the need for admission with patient at bedside. Pt express understanding at this time and agree with all information. All questions answered. Pt have no further questions or concerns at this time. Pt is ready for admit.        ED Medication(s):  Medications   sodium chloride 0.9% bolus 1,000 mL (0 mLs Intravenous Stopped 5/31/17 7337)       New Prescriptions    No medications on file             Medical Decision Making    Medical Decision Making:   Clinical Tests:   Lab Tests: Ordered and Reviewed  Radiological Study: Ordered and Reviewed  Medical Tests: Ordered and Reviewed           Scribe Attestation:   Scribe #1: I performed the above scribed service and the documentation accurately describes the services I performed. I attest to the accuracy of the note.    Attending:   Physician Attestation Statement for Scribe #1: I, Vanessa Rojas MD, personally performed the services described in this documentation, as scribed by Brent Sam, in my presence, and it is both accurate and complete.          Clinical Impression       ICD-10-CM ICD-9-CM   1. Orthostasis I95.1 458.0   2. Dizziness R42 780.4       Disposition:   Disposition: Placed in Observation  Condition: Fair         Vanessa Rojas MD  05/31/17 2138

## 2017-06-06 ENCOUNTER — ROUTINE PRENATAL (OUTPATIENT)
Dept: OBSTETRICS AND GYNECOLOGY | Facility: CLINIC | Age: 22
End: 2017-06-06
Payer: MEDICAID

## 2017-06-06 VITALS
WEIGHT: 260.56 LBS | SYSTOLIC BLOOD PRESSURE: 122 MMHG | BODY MASS INDEX: 42.06 KG/M2 | DIASTOLIC BLOOD PRESSURE: 84 MMHG

## 2017-06-06 DIAGNOSIS — Z34.80 SUPERVISION OF OTHER NORMAL PREGNANCY: ICD-10-CM

## 2017-06-06 DIAGNOSIS — D50.8 IRON DEFICIENCY ANEMIA SECONDARY TO INADEQUATE DIETARY IRON INTAKE: ICD-10-CM

## 2017-06-06 DIAGNOSIS — O99.213 OBESITY COMPLICATING PREGNANCY, THIRD TRIMESTER: ICD-10-CM

## 2017-06-06 DIAGNOSIS — Z23 NEED FOR TDAP VACCINATION: Primary | ICD-10-CM

## 2017-06-06 PROCEDURE — 99999 PR PBB SHADOW E&M-EST. PATIENT-LVL II: CPT | Mod: PBBFAC,,, | Performed by: ADVANCED PRACTICE MIDWIFE

## 2017-06-06 PROCEDURE — 90715 TDAP VACCINE 7 YRS/> IM: CPT | Mod: PBBFAC,PO

## 2017-06-06 PROCEDURE — 99212 OFFICE O/P EST SF 10 MIN: CPT | Mod: TH,S$PBB,, | Performed by: ADVANCED PRACTICE MIDWIFE

## 2017-06-06 PROCEDURE — 90471 IMMUNIZATION ADMIN: CPT | Mod: PBBFAC,PO

## 2017-06-06 PROCEDURE — 99212 OFFICE O/P EST SF 10 MIN: CPT | Mod: PBBFAC,PO | Performed by: ADVANCED PRACTICE MIDWIFE

## 2017-06-06 NOTE — PROGRESS NOTES
Baby active desires lela pp ordered today  Begin iron daily for anemia  TDAP today   Begin BPP 2 weeks  Coffective counseling sheet Learn Your Baby discussed with mother. Instructed regarding feeding cues and methods to calm baby. Encouraged mother to download Coffective mobile lit if she has not already done so.  Mother verbalized understanding.

## 2017-06-13 ENCOUNTER — PATIENT MESSAGE (OUTPATIENT)
Dept: OBSTETRICS AND GYNECOLOGY | Facility: CLINIC | Age: 22
End: 2017-06-13

## 2017-06-19 RX ORDER — PROMETHAZINE HYDROCHLORIDE 25 MG/1
SUPPOSITORY RECTAL
Qty: 10 SUPPOSITORY | Refills: 0 | Status: SHIPPED | OUTPATIENT
Start: 2017-06-19 | End: 2017-07-03 | Stop reason: SDUPTHER

## 2017-06-20 ENCOUNTER — ROUTINE PRENATAL (OUTPATIENT)
Dept: OBSTETRICS AND GYNECOLOGY | Facility: CLINIC | Age: 22
End: 2017-06-20
Payer: MEDICAID

## 2017-06-20 ENCOUNTER — PROCEDURE VISIT (OUTPATIENT)
Dept: OBSTETRICS AND GYNECOLOGY | Facility: CLINIC | Age: 22
End: 2017-06-20
Payer: MEDICAID

## 2017-06-20 VITALS
WEIGHT: 256.63 LBS | SYSTOLIC BLOOD PRESSURE: 134 MMHG | DIASTOLIC BLOOD PRESSURE: 84 MMHG | BODY MASS INDEX: 41.42 KG/M2

## 2017-06-20 DIAGNOSIS — Z34.80 SUPERVISION OF OTHER NORMAL PREGNANCY: Primary | ICD-10-CM

## 2017-06-20 DIAGNOSIS — O99.213 OBESITY COMPLICATING PREGNANCY, THIRD TRIMESTER: ICD-10-CM

## 2017-06-20 PROBLEM — K11.7 PTYALISM: Status: RESOLVED | Noted: 2017-02-14 | Resolved: 2017-06-20

## 2017-06-20 PROBLEM — I95.1 ORTHOSTASIS: Status: RESOLVED | Noted: 2017-05-31 | Resolved: 2017-06-20

## 2017-06-20 PROCEDURE — 76815 OB US LIMITED FETUS(S): CPT | Mod: 26,59,S$PBB, | Performed by: OBSTETRICS & GYNECOLOGY

## 2017-06-20 PROCEDURE — 76819 FETAL BIOPHYS PROFIL W/O NST: CPT | Mod: 26,S$PBB,, | Performed by: OBSTETRICS & GYNECOLOGY

## 2017-06-20 PROCEDURE — 99212 OFFICE O/P EST SF 10 MIN: CPT | Mod: PBBFAC,PO | Performed by: ADVANCED PRACTICE MIDWIFE

## 2017-06-20 PROCEDURE — 99212 OFFICE O/P EST SF 10 MIN: CPT | Mod: TH,S$PBB,, | Performed by: ADVANCED PRACTICE MIDWIFE

## 2017-06-20 PROCEDURE — 76819 FETAL BIOPHYS PROFIL W/O NST: CPT | Mod: 59,PBBFAC,PO | Performed by: OBSTETRICS & GYNECOLOGY

## 2017-06-20 PROCEDURE — 76815 OB US LIMITED FETUS(S): CPT | Mod: 59,PBBFAC,PO | Performed by: OBSTETRICS & GYNECOLOGY

## 2017-06-20 PROCEDURE — 99999 PR PBB SHADOW E&M-EST. PATIENT-LVL II: CPT | Mod: PBBFAC,,, | Performed by: ADVANCED PRACTICE MIDWIFE

## 2017-06-20 NOTE — PROGRESS NOTES
sono done male  BPP 8/8  EFW 4#13oz  Coffective counseling sheet Nourish discussed with mother. Reinforced basic breastfeeding position and latch as well as proper hand expression technique. Encouraged mother to download Coffective mobile lit if she has not already done so.  Mother verbalizes understanding.

## 2017-07-03 ENCOUNTER — ROUTINE PRENATAL (OUTPATIENT)
Dept: OBSTETRICS AND GYNECOLOGY | Facility: CLINIC | Age: 22
End: 2017-07-03
Payer: MEDICAID

## 2017-07-03 VITALS — SYSTOLIC BLOOD PRESSURE: 125 MMHG | BODY MASS INDEX: 42.77 KG/M2 | DIASTOLIC BLOOD PRESSURE: 82 MMHG | WEIGHT: 265 LBS

## 2017-07-03 DIAGNOSIS — Z34.80 SUPERVISION OF OTHER NORMAL PREGNANCY: ICD-10-CM

## 2017-07-03 DIAGNOSIS — O99.213 OBESITY COMPLICATING PREGNANCY, THIRD TRIMESTER: Primary | ICD-10-CM

## 2017-07-03 PROCEDURE — 99212 OFFICE O/P EST SF 10 MIN: CPT | Mod: TH,S$PBB,, | Performed by: ADVANCED PRACTICE MIDWIFE

## 2017-07-03 PROCEDURE — 99999 PR PBB SHADOW E&M-EST. PATIENT-LVL II: CPT | Mod: PBBFAC,,, | Performed by: ADVANCED PRACTICE MIDWIFE

## 2017-07-03 PROCEDURE — 99212 OFFICE O/P EST SF 10 MIN: CPT | Mod: PBBFAC,PO | Performed by: ADVANCED PRACTICE MIDWIFE

## 2017-07-03 RX ORDER — PROMETHAZINE HYDROCHLORIDE 25 MG/1
SUPPOSITORY RECTAL
Qty: 10 SUPPOSITORY | Refills: 0 | Status: SHIPPED | OUTPATIENT
Start: 2017-07-03 | End: 2017-08-28

## 2017-07-03 RX ORDER — VALACYCLOVIR HYDROCHLORIDE 500 MG/1
500 TABLET, FILM COATED ORAL DAILY
Qty: 30 TABLET | Refills: 0 | Status: SHIPPED | OUTPATIENT
Start: 2017-07-03 | End: 2018-09-19 | Stop reason: SDUPTHER

## 2017-07-11 ENCOUNTER — ROUTINE PRENATAL (OUTPATIENT)
Dept: OBSTETRICS AND GYNECOLOGY | Facility: CLINIC | Age: 22
End: 2017-07-11
Payer: MEDICAID

## 2017-07-11 ENCOUNTER — PROCEDURE VISIT (OUTPATIENT)
Dept: OBSTETRICS AND GYNECOLOGY | Facility: CLINIC | Age: 22
End: 2017-07-11
Payer: MEDICAID

## 2017-07-11 VITALS — SYSTOLIC BLOOD PRESSURE: 136 MMHG | BODY MASS INDEX: 42.7 KG/M2 | WEIGHT: 264.56 LBS | DIASTOLIC BLOOD PRESSURE: 91 MMHG

## 2017-07-11 DIAGNOSIS — O99.213 OBESITY COMPLICATING PREGNANCY, THIRD TRIMESTER: Primary | ICD-10-CM

## 2017-07-11 DIAGNOSIS — O99.213 OBESITY COMPLICATING PREGNANCY, THIRD TRIMESTER: ICD-10-CM

## 2017-07-11 DIAGNOSIS — O99.343 DEPRESSION AFFECTING PREGNANCY IN THIRD TRIMESTER, ANTEPARTUM: ICD-10-CM

## 2017-07-11 DIAGNOSIS — O09.93 SUPERVISION OF HIGH RISK PREGNANCY IN THIRD TRIMESTER: ICD-10-CM

## 2017-07-11 DIAGNOSIS — F32.A DEPRESSION AFFECTING PREGNANCY IN THIRD TRIMESTER, ANTEPARTUM: ICD-10-CM

## 2017-07-11 DIAGNOSIS — Z34.80 SUPERVISION OF OTHER NORMAL PREGNANCY: ICD-10-CM

## 2017-07-11 PROBLEM — O21.9 NAUSEA/VOMITING IN PREGNANCY: Status: RESOLVED | Noted: 2017-01-09 | Resolved: 2017-07-11

## 2017-07-11 PROCEDURE — 76815 OB US LIMITED FETUS(S): CPT | Mod: 26,59,S$PBB, | Performed by: OBSTETRICS & GYNECOLOGY

## 2017-07-11 PROCEDURE — 76819 FETAL BIOPHYS PROFIL W/O NST: CPT | Mod: 26,59,S$PBB, | Performed by: OBSTETRICS & GYNECOLOGY

## 2017-07-11 PROCEDURE — 99999 PR PBB SHADOW E&M-EST. PATIENT-LVL II: CPT | Mod: PBBFAC,,, | Performed by: ADVANCED PRACTICE MIDWIFE

## 2017-07-11 PROCEDURE — 99213 OFFICE O/P EST LOW 20 MIN: CPT | Mod: TH,S$PBB,, | Performed by: ADVANCED PRACTICE MIDWIFE

## 2017-07-11 PROCEDURE — 76815 OB US LIMITED FETUS(S): CPT | Mod: 59,PBBFAC,PO | Performed by: OBSTETRICS & GYNECOLOGY

## 2017-07-11 PROCEDURE — 76819 FETAL BIOPHYS PROFIL W/O NST: CPT | Mod: 59,PBBFAC,PO | Performed by: OBSTETRICS & GYNECOLOGY

## 2017-07-11 RX ORDER — SERTRALINE HYDROCHLORIDE 50 MG/1
50 TABLET, FILM COATED ORAL DAILY
Qty: 30 TABLET | Refills: 2 | Status: SHIPPED | OUTPATIENT
Start: 2017-07-11 | End: 2017-08-15 | Stop reason: SDUPTHER

## 2017-07-11 NOTE — PROGRESS NOTES
BPP 8/8 EFW 6#2oz MVP 6.1  Watch BP denies headache, blurred vision, epigastric pain  Has not been taking valtrex Encouraged to take  Coffective counseling sheet Protect Breastfeeding discussed with mother. Reinforced avoidence of artifical nipples and formula, unless medically indicated.  Encouraged mother to download Coffective mobile lit if she has not already done so. Mother verbalizes understanding.  Requesting antidepressant Benefits and risks discussed  RX zoloft denies desire to hurt self or baby

## 2017-07-18 ENCOUNTER — TELEPHONE (OUTPATIENT)
Dept: OBSTETRICS AND GYNECOLOGY | Facility: HOSPITAL | Age: 22
End: 2017-07-18

## 2017-07-18 ENCOUNTER — ROUTINE PRENATAL (OUTPATIENT)
Dept: OBSTETRICS AND GYNECOLOGY | Facility: CLINIC | Age: 22
End: 2017-07-18
Payer: MEDICAID

## 2017-07-18 ENCOUNTER — HOSPITAL ENCOUNTER (OUTPATIENT)
Facility: HOSPITAL | Age: 22
Discharge: HOME | End: 2017-07-18
Attending: OBSTETRICS & GYNECOLOGY | Admitting: OBSTETRICS & GYNECOLOGY
Payer: MEDICAID

## 2017-07-18 VITALS
OXYGEN SATURATION: 16 % | HEART RATE: 81 BPM | RESPIRATION RATE: 18 BRPM | SYSTOLIC BLOOD PRESSURE: 140 MMHG | DIASTOLIC BLOOD PRESSURE: 82 MMHG

## 2017-07-18 VITALS — DIASTOLIC BLOOD PRESSURE: 99 MMHG | SYSTOLIC BLOOD PRESSURE: 151 MMHG | WEIGHT: 265 LBS | BODY MASS INDEX: 42.77 KG/M2

## 2017-07-18 DIAGNOSIS — Z34.80 SUPERVISION OF OTHER NORMAL PREGNANCY: Primary | ICD-10-CM

## 2017-07-18 DIAGNOSIS — R51.9 HEADACHE IN PREGNANCY: ICD-10-CM

## 2017-07-18 DIAGNOSIS — O16.3 ELEVATED BLOOD PRESSURE AFFECTING PREGNANCY IN THIRD TRIMESTER, ANTEPARTUM: Primary | ICD-10-CM

## 2017-07-18 DIAGNOSIS — O99.213 OBESITY COMPLICATING PREGNANCY, THIRD TRIMESTER: ICD-10-CM

## 2017-07-18 DIAGNOSIS — O26.899 HEADACHE IN PREGNANCY: ICD-10-CM

## 2017-07-18 LAB
ALBUMIN SERPL BCP-MCNC: 2.6 G/DL
ALP SERPL-CCNC: 198 U/L
ALT SERPL W/O P-5'-P-CCNC: 7 U/L
ANION GAP SERPL CALC-SCNC: 11 MMOL/L
AST SERPL-CCNC: 28 U/L
BASOPHILS # BLD AUTO: 0.01 K/UL
BASOPHILS NFR BLD: 0.2 %
BILIRUB SERPL-MCNC: 0.3 MG/DL
BUN SERPL-MCNC: 3 MG/DL
CALCIUM SERPL-MCNC: 8.4 MG/DL
CHLORIDE SERPL-SCNC: 104 MMOL/L
CO2 SERPL-SCNC: 18 MMOL/L
CREAT SERPL-MCNC: 0.6 MG/DL
CREAT UR-MCNC: 87.6 MG/DL
DIFFERENTIAL METHOD: ABNORMAL
EOSINOPHIL # BLD AUTO: 0.1 K/UL
EOSINOPHIL NFR BLD: 1.1 %
ERYTHROCYTE [DISTWIDTH] IN BLOOD BY AUTOMATED COUNT: 13.1 %
EST. GFR  (AFRICAN AMERICAN): >60 ML/MIN/1.73 M^2
EST. GFR  (NON AFRICAN AMERICAN): >60 ML/MIN/1.73 M^2
GLUCOSE SERPL-MCNC: 93 MG/DL
HCT VFR BLD AUTO: 28.2 %
HGB BLD-MCNC: 9.4 G/DL
LYMPHOCYTES # BLD AUTO: 1.8 K/UL
LYMPHOCYTES NFR BLD: 31.9 %
MCH RBC QN AUTO: 27.2 PG
MCHC RBC AUTO-ENTMCNC: 33.3 %
MCV RBC AUTO: 82 FL
MONOCYTES # BLD AUTO: 0.5 K/UL
MONOCYTES NFR BLD: 7.9 %
NEUTROPHILS # BLD AUTO: 3.4 K/UL
NEUTROPHILS NFR BLD: 58.9 %
PLATELET # BLD AUTO: 261 K/UL
PMV BLD AUTO: 9.8 FL
POTASSIUM SERPL-SCNC: 4.2 MMOL/L
PROT SERPL-MCNC: 7.2 G/DL
PROT UR-MCNC: 18 MG/DL
PROT/CREAT RATIO, UR: 0.21
RBC # BLD AUTO: 3.46 M/UL
SODIUM SERPL-SCNC: 133 MMOL/L
WBC # BLD AUTO: 5.71 K/UL

## 2017-07-18 PROCEDURE — 25000003 PHARM REV CODE 250: Performed by: ADVANCED PRACTICE MIDWIFE

## 2017-07-18 PROCEDURE — 59025 FETAL NON-STRESS TEST: CPT

## 2017-07-18 PROCEDURE — 99999 PR PBB SHADOW E&M-EST. PATIENT-LVL II: CPT | Mod: PBBFAC,,, | Performed by: ADVANCED PRACTICE MIDWIFE

## 2017-07-18 PROCEDURE — 59025 FETAL NON-STRESS TEST: CPT | Mod: 26,,, | Performed by: ADVANCED PRACTICE MIDWIFE

## 2017-07-18 PROCEDURE — 99212 OFFICE O/P EST SF 10 MIN: CPT | Mod: TH,S$PBB,, | Performed by: ADVANCED PRACTICE MIDWIFE

## 2017-07-18 PROCEDURE — 82570 ASSAY OF URINE CREATININE: CPT

## 2017-07-18 PROCEDURE — 99211 OFF/OP EST MAY X REQ PHY/QHP: CPT | Mod: 25,27

## 2017-07-18 PROCEDURE — 80053 COMPREHEN METABOLIC PANEL: CPT

## 2017-07-18 PROCEDURE — 85025 COMPLETE CBC W/AUTO DIFF WBC: CPT

## 2017-07-18 RX ORDER — ACETAMINOPHEN 325 MG/1
650 TABLET ORAL ONCE
Status: COMPLETED | OUTPATIENT
Start: 2017-07-18 | End: 2017-07-18

## 2017-07-18 RX ADMIN — ACETAMINOPHEN 650 MG: 325 TABLET ORAL at 07:07

## 2017-07-18 NOTE — SUBJECTIVE & OBJECTIVE
Obstetric HPI:  Patient reports None contractions, active fetal movement, No vaginal bleeding , No loss of fluid     This pregnancy has been complicated by depression, HSV, anemia, obesity    Obstetric History       T0      L0     SAB0   TAB0   Ectopic0   Multiple0   Live Births0       # Outcome Date GA Lbr Goldy/2nd Weight Sex Delivery Anes PTL Lv   1 Current                 Past Medical History:   Diagnosis Date    Abnormal Pap smear of cervix     Herpes simplex virus (HSV) infection      History reviewed. No pertinent surgical history.    PTA Medications   Medication Sig    prenatal vit#103-iron-FA () 27 mg iron- 1 mg Tab Take 1 tablet by mouth once daily.    promethazine (PROMETHEGAN) 25 MG suppository UNWRAP AND INSERT ONE SUPPOSITORY RECTALLY EVERY 6 HOURS AS NEEDED FOR NAUSEA    sertraline (ZOLOFT) 50 MG tablet Take 1 tablet (50 mg total) by mouth once daily.    valacyclovir (VALTREX) 500 MG tablet Take 1 tablet (500 mg total) by mouth once daily.       Review of patient's allergies indicates:  No Known Allergies     Family History     Problem Relation (Age of Onset)    Breast cancer Maternal Grandmother    Colon cancer Maternal Grandfather    Diabetes Maternal Grandmother        Social History Main Topics    Smoking status: Never Smoker    Smokeless tobacco: Never Used    Alcohol use No      Comment: social use     Drug use: No    Sexual activity: Yes     Partners: Male     Birth control/ protection: None     Review of Systems   Constitutional: Negative for chills, fatigue and fever.   Eyes: Negative for visual disturbance.   Respiratory: Negative for shortness of breath.    Cardiovascular: Negative for chest pain and palpitations.   Gastrointestinal: Negative for abdominal pain, diarrhea, nausea and vomiting.   Genitourinary: Positive for flank pain. Negative for dysuria, vaginal bleeding, vaginal discharge and vaginal pain.   Musculoskeletal: Positive for back pain.    Neurological: Positive for headaches.      Objective:     Vital Signs (Most Recent):  Pulse: 92 (07/18/17 1600)  Resp: 17 (07/18/17 1600)  BP: 136/79 (07/18/17 1600)  SpO2: (!) 16 % (07/18/17 1600) Vital Signs (24h Range):  Pulse:  [92] 92  Resp:  [17] 17  SpO2:  [16 %] 16 %  BP: (136-151)/(79-99) 136/79        There is no height or weight on file to calculate BMI.    FHT: Cat 1 (reassuring)  TOCO: none    Physical Exam:   Constitutional: She is oriented to person, place, and time. She appears well-developed and well-nourished. No distress.    HENT:   Head: Normocephalic and atraumatic.       Pulmonary/Chest: Effort normal.        Abdominal: Soft. There is no tenderness.     Genitourinary:   Genitourinary Comments: Neg flank pain           Musculoskeletal: Moves all extremeties.       Neurological: She is alert and oriented to person, place, and time.   DTRs 3+ bilaterally, neg clonus    Skin: Skin is warm and dry.    Psychiatric: She has a normal mood and affect.       Cervix:  Deferred     Significant Labs:  Lab Results   Component Value Date    GROUPTRH O POS 12/16/2016    HEPBSAG Negative 12/16/2016       I have personallly reviewed all pertinent lab results from the last 24 hours.

## 2017-07-18 NOTE — PROCEDURES
Baseline: 135  Accels: present  Decels: none  [place]: none  Interpretation: Cat I  NST: reactive

## 2017-07-18 NOTE — PROGRESS NOTES
GBS done  To hospital for PIH workup  Headache for last 4 days  Has not taken zoloft  Coffective counseling sheet Protect Breastfeeding discussed with mother. Reinforced avoidence of artifical nipples and formula, unless medically indicated.  Encouraged mother to download Coffective mobile lit if she has not already done so. Mother verbalizes understanding.

## 2017-07-18 NOTE — DISCHARGE SUMMARY
"[facility] -   Obstetrics  Discharge Summary      Patient Name: Juliet Jalloh  MRN: [id number]  Admission Date: 7/18/2017  Hospital Length of Stay: 0 days  Discharge Date and Time: 07/18/2017  Attending Physician: Guillaume Babb MD   Discharging Provider: Phyllis Mas CNM  Primary Care Provider: Primary Doctor No    HPI: Patient sent from clinic for pre-e work-up secondary to elevated BPs and protein on urine dipstick. Patient reports headache "off and on" x4 days. Reports sometimes Tylenol helps, but other times it does not. Denies visual disturbances, epigastric pain or swelling.       * No surgery found *     Hospital Course:   Sent from clinic for pre-e work-up  BPs mildly elevated. PCR 0.2. Currently asymptomatic. Discussed with Dr. Babb. Discharged home with pre-e precautions and follow-up appointment on Friday.        Final Active Diagnoses:    Diagnosis Date Noted POA    PRINCIPAL PROBLEM:  Elevated blood pressure affecting pregnancy in third trimester, antepartum [O13.3] 07/18/2017 Yes      Problems Resolved During this Admission:    Diagnosis Date Noted Date Resolved POA        Labs: All labs within the past 24 hours have been reviewed    Feeding Method: breast    Immunizations     None          This patient has no babies on file.  Pending Diagnostic Studies:     None          Discharged Condition: stable    Disposition: Home or Self Care    Follow Up:    Patient Instructions:     Pt was given routine pregnancy instructions including to return to triage if she had vaginal bleeding (other than vaginal spotting over the next 48 hours), any loss of fluid, decreased fetal movement, or contractions that occur every 2-5 min lasting for 1-2 hours. Pt was also instructed to drink about 8-10 glasses of water per day. She was also given instructions to return for pre-eclampsia symptoms including: headache not relieved with tylenol, shortness of breath, changes in her vision, or pain in the " right upper quadrant of her abdomen. Patient has phone number to call if she has any questions or concerns. Patient voiced understanding of all these instructions and was subsequently discharged home.      Diet general     Activity as tolerated       Medications:  Current Discharge Medication List      CONTINUE these medications which have NOT CHANGED    Details   prenatal vit#103-iron-FA () 27 mg iron- 1 mg Tab Take 1 tablet by mouth once daily.  Qty: 30 tablet, Refills: 9    Comments: Any generic may be used      promethazine (PROMETHEGAN) 25 MG suppository UNWRAP AND INSERT ONE SUPPOSITORY RECTALLY EVERY 6 HOURS AS NEEDED FOR NAUSEA  Qty: 10 suppository, Refills: 0      sertraline (ZOLOFT) 50 MG tablet Take 1 tablet (50 mg total) by mouth once daily.  Qty: 30 tablet, Refills: 2      valacyclovir (VALTREX) 500 MG tablet Take 1 tablet (500 mg total) by mouth once daily.  Qty: 30 tablet, Refills: 0             Phyllis Mas CNM  Obstetrics  [facility] [place]

## 2017-07-18 NOTE — H&P
"[facility] -   Obstetrics  History & Physical    Patient Name: Juliet Jalloh  MRN: [id number]  Admission Date: 2017  Primary Care Provider: Primary Doctor No    Subjective:     Principal Problem: headache in pregnancy    History of Present Illness:  Patient sent from clinic for pre-e work-up secondary to elevated BPs and protein on urine dipstick. Patient reports headache "off and on" x4 days. Reports sometimes Tylenol helps, but other times it does not. Denies visual disturbances, epigastric pain or swelling.     BPP 8/8, EFW 44th% on sono last week    Obstetric HPI:  Patient reports None contractions, active fetal movement, No vaginal bleeding , No loss of fluid     This pregnancy has been complicated by depression, HSV, anemia, obesity    Obstetric History       T0      L0     SAB0   TAB0   Ectopic0   Multiple0   Live Births0       # Outcome Date GA Lbr Goldy/2nd Weight Sex Delivery Anes PTL Lv   1 Current                 Past Medical History:   Diagnosis Date    Abnormal Pap smear of cervix     Herpes simplex virus (HSV) infection      History reviewed. No pertinent surgical history.    PTA Medications   Medication Sig    prenatal vit#103-iron-FA () 27 mg iron- 1 mg Tab Take 1 tablet by mouth once daily.    promethazine (PROMETHEGAN) 25 MG suppository UNWRAP AND INSERT ONE SUPPOSITORY RECTALLY EVERY 6 HOURS AS NEEDED FOR NAUSEA    sertraline (ZOLOFT) 50 MG tablet Take 1 tablet (50 mg total) by mouth once daily.    valacyclovir (VALTREX) 500 MG tablet Take 1 tablet (500 mg total) by mouth once daily.       Review of patient's allergies indicates:  No Known Allergies     Family History     Problem Relation (Age of Onset)    Breast cancer Maternal Grandmother    Colon cancer Maternal Grandfather    Diabetes Maternal Grandmother        Social History Main Topics    Smoking status: Never Smoker    Smokeless tobacco: Never Used    Alcohol use No      Comment: social " use     Drug use: No    Sexual activity: Yes     Partners: Male     Birth control/ protection: None     Review of Systems   Constitutional: Negative for chills, fatigue and fever.   Eyes: Negative for visual disturbance.   Respiratory: Negative for shortness of breath.    Cardiovascular: Negative for chest pain and palpitations.   Gastrointestinal: Negative for abdominal pain, diarrhea, nausea and vomiting.   Genitourinary: Positive for flank pain. Negative for dysuria, vaginal bleeding, vaginal discharge and vaginal pain.   Musculoskeletal: Positive for back pain.   Neurological: Positive for headaches.      Objective:     Vital Signs (Most Recent):  Pulse: 92 (17 1600)  Resp: 17 (17 1600)  BP: 136/79 (17 1600)  SpO2: (!) 16 % (17 1600) Vital Signs (24h Range):  Pulse:  [92] 92  Resp:  [17] 17  SpO2:  [16 %] 16 %  BP: (136-151)/(79-99) 136/79        There is no height or weight on file to calculate BMI.    FHT: Cat 1 (reassuring)  TOCO: none    Physical Exam:   Constitutional: She is oriented to person, place, and time. She appears well-developed and well-nourished. No distress.    HENT:   Head: Normocephalic and atraumatic.       Pulmonary/Chest: Effort normal.        Abdominal: Soft. There is no tenderness.     Genitourinary:   Genitourinary Comments: Neg flank pain           Musculoskeletal: Moves all extremeties.       Neurological: She is alert and oriented to person, place, and time.   DTRs 3+ bilaterally, neg clonus    Skin: Skin is warm and dry.    Psychiatric: She has a normal mood and affect.       Cervix:  Deferred     Significant Labs:  Lab Results   Component Value Date    GROUPTRH O POS 2016    HEPBSAG Negative 2016       I have personallly reviewed all pertinent lab results from the last 24 hours.    Assessment/Plan:     22 y.o. female  at 36w1d for:    Headache in pregnancy    Pre-e workup  Serial BPs  CEFM and TOCO          Phyllis Mas  CNM  Obstetrics  [facility] [place]

## 2017-07-18 NOTE — TELEPHONE ENCOUNTER
Please schedule patient for routine OB on Friday 7/21. In comments put follow-up BP (PCR 0.2 on 7/18). Call patient with appointment information.

## 2017-07-18 NOTE — HPI
"Patient sent from clinic for pre-e work-up secondary to elevated BPs and protein on urine dipstick. Patient reports headache "off and on" x4 days. Reports sometimes Tylenol helps, but other times it does not. Denies visual disturbances, epigastric pain or swelling.     "

## 2017-07-18 NOTE — HOSPITAL COURSE
Sent from clinic for pre-e work-up  BPs mildly elevated. PCR 0.2. Currently asymptomatic. Discussed with Dr. Babb. Discharged home with pre-e precautions and follow-up appointment on Friday.

## 2017-07-19 ENCOUNTER — HOSPITAL ENCOUNTER (OUTPATIENT)
Facility: HOSPITAL | Age: 22
Discharge: HOME | End: 2017-07-20
Attending: OBSTETRICS & GYNECOLOGY | Admitting: OBSTETRICS & GYNECOLOGY
Payer: MEDICAID

## 2017-07-19 DIAGNOSIS — R03.0 ELEVATED BP WITHOUT DIAGNOSIS OF HYPERTENSION: ICD-10-CM

## 2017-07-19 LAB
CREAT UR-MCNC: 251.2 MG/DL
PROT UR-MCNC: 31 MG/DL
PROT/CREAT RATIO, UR: 0.12

## 2017-07-19 PROCEDURE — 25000003 PHARM REV CODE 250: Performed by: ADVANCED PRACTICE MIDWIFE

## 2017-07-19 PROCEDURE — 59025 FETAL NON-STRESS TEST: CPT

## 2017-07-19 PROCEDURE — 99211 OFF/OP EST MAY X REQ PHY/QHP: CPT | Mod: 25

## 2017-07-19 PROCEDURE — 84156 ASSAY OF PROTEIN URINE: CPT

## 2017-07-19 RX ORDER — ONDANSETRON 8 MG/1
8 TABLET, ORALLY DISINTEGRATING ORAL EVERY 8 HOURS PRN
Status: DISCONTINUED | OUTPATIENT
Start: 2017-07-19 | End: 2017-07-20 | Stop reason: HOSPADM

## 2017-07-19 RX ORDER — ACETAMINOPHEN 500 MG
500 TABLET ORAL EVERY 6 HOURS PRN
Status: DISCONTINUED | OUTPATIENT
Start: 2017-07-19 | End: 2017-07-20 | Stop reason: HOSPADM

## 2017-07-19 RX ORDER — NIFEDIPINE 30 MG/1
60 TABLET, EXTENDED RELEASE ORAL DAILY
Status: DISCONTINUED | OUTPATIENT
Start: 2017-07-19 | End: 2017-07-20 | Stop reason: HOSPADM

## 2017-07-19 RX ORDER — OXYCODONE AND ACETAMINOPHEN 5; 325 MG/1; MG/1
1 TABLET ORAL EVERY 4 HOURS PRN
Status: DISCONTINUED | OUTPATIENT
Start: 2017-07-19 | End: 2017-07-20 | Stop reason: HOSPADM

## 2017-07-19 RX ADMIN — OXYCODONE HYDROCHLORIDE AND ACETAMINOPHEN 1 TABLET: 5; 325 TABLET ORAL at 09:07

## 2017-07-19 RX ADMIN — ONDANSETRON 8 MG: 8 TABLET, ORALLY DISINTEGRATING ORAL at 09:07

## 2017-07-19 RX ADMIN — NIFEDIPINE 60 MG: 30 TABLET, FILM COATED, EXTENDED RELEASE ORAL at 09:07

## 2017-07-19 NOTE — DISCHARGE SUMMARY
"[facility] -   Obstetrics  Discharge Summary      Patient Name: Juliet Jalloh  MRN: [id number]  Admission Date: 7/18/2017  Hospital Length of Stay: 0 days  Discharge Date and Time: 7/18/17  Attending Physician: Guillaume Babb MD   Discharging Provider: Mary Vazquez MD  Primary Care Provider: Primary Doctor No    HPI: Patient sent from clinic for pre-e work-up secondary to elevated BPs and protein on urine dipstick. Patient reports headache "off and on" x4 days. Reports sometimes Tylenol helps, but other times it does not. Denies visual disturbances, epigastric pain or swelling.       * No surgery found *     Hospital Course:   Sent from clinic for pre-e work-up  BPs mildly elevated. PCR 0.2. Currently asymptomatic. Discussed with Dr. Babb. Discharged home with pre-e precautions and follow-up appointment on Friday.     Pt monitored with serial blood pressures & labs, both showing mild preE. May need to be delivered at term if BPs continue to be elevated. Headache has improved after tylenol & being able to eat.       Final Active Diagnoses:    Diagnosis Date Noted POA    PRINCIPAL PROBLEM:  Elevated blood pressure affecting pregnancy in third trimester, antepartum [O13.3] 07/18/2017 Yes    Obesity affecting pregnancy in second trimester [O99.212] 05/09/2017 Yes    Herpes simplex virus (HSV) infection [B00.9]  Yes      Problems Resolved During this Admission:    Diagnosis Date Noted Date Resolved POA        Normal LFTs/CBC. Protein creatinine ratio 0.21    Vitals:    07/18/17 1954 07/18/17 2004 07/18/17 2015 07/18/17 2024   BP: (!) 150/85 126/81 (!) 144/89 (!) 150/92   Pulse: 75 81  76   Resp:       SpO2:        07/18/17 2034 07/18/17 2044 07/18/17 2054 07/18/17 2104   BP: (!) 151/89 (!) 146/90 136/86 137/75   Pulse: 86 81 76 79   Resp:       SpO2:        07/18/17 2114 07/18/17 2124 07/18/17 2134 07/18/17 2144   BP: (!) 152/96 (!) 146/88 (!) 143/87 135/76   Pulse: 100 88 89 74   Resp:     "   SpO2:        07/18/17 2154 07/18/17 2204 07/18/17 2214   BP: 137/89 117/73 (!) 140/82   Pulse: 74 75 81   Resp:      SpO2:        FHTs cat 1  TOCO: none      Immunizations     None          This patient has no babies on file.  Pending Diagnostic Studies:     None          Discharged Condition: good    Disposition:     Follow Up:  Follow-up Information     Please follow up.    Why:  friday or early next week with CNM               Patient Instructions:     Diet general     Activity as tolerated       Medications:  Current Discharge Medication List      CONTINUE these medications which have NOT CHANGED    Details   prenatal vit#103-iron-FA () 27 mg iron- 1 mg Tab Take 1 tablet by mouth once daily.  Qty: 30 tablet, Refills: 9    Comments: Any generic may be used      promethazine (PROMETHEGAN) 25 MG suppository UNWRAP AND INSERT ONE SUPPOSITORY RECTALLY EVERY 6 HOURS AS NEEDED FOR NAUSEA  Qty: 10 suppository, Refills: 0      sertraline (ZOLOFT) 50 MG tablet Take 1 tablet (50 mg total) by mouth once daily.  Qty: 30 tablet, Refills: 2      valacyclovir (VALTREX) 500 MG tablet Take 1 tablet (500 mg total) by mouth once daily.  Qty: 30 tablet, Refills: 0             Mary Vazquez MD  Obstetrics  [facility]

## 2017-07-19 NOTE — NURSING
Gave patient AVS and educated on  discharge information. Educated on nutrition, hydration, home medications, fetal kick counts, activity, s/s of OB emergencies, and reasons to notify OB provider (including vaginal bleeding like a period, rupture of membranes, constant and strong abdominal pain or tenderness that does not go away, contractions every 3-5 min for 1-2 hours that are increasing in strength, changes in urination such as hematuria/oliguria/dysuria/urgency/frequency, temp greater than 100.4 F). Patient verbalized understanding.    Educated on calling office in the morning for follow-up appointment either Friday or next week per MD orders upon D/C. Provided patient with number to call. Verbalized understanding.

## 2017-07-19 NOTE — PROGRESS NOTES
Initially discussed plan of care with Dr. Babb and plan was to discharge home with follow-up appointment and pre-e precautions. After entering discharge orders, patient reported BENSON that she desired medication for. States she is unsure if r/t BP or hunger. BPs now 160s/90s. Will treat HA, extend monitoring and allow to eat dinner.

## 2017-07-19 NOTE — NURSING
Dr. Babb advised patient to refrain from eating and that he would like to extend BP/fetal monitoring to a minimum of 6 hours from arriving to triage (15:45-21:45) to see if BP's are trending down.

## 2017-07-19 NOTE — NURSING
Updated Dr. Vazquez on latest BP ranges 117-143/73-89 and of mild relief of H/A with eating and use of Tylenol. MD beckford'd to D/C home with apt scheduled for next week for follow -up.

## 2017-07-20 VITALS
RESPIRATION RATE: 18 BRPM | BODY MASS INDEX: 42.43 KG/M2 | WEIGHT: 264 LBS | DIASTOLIC BLOOD PRESSURE: 88 MMHG | HEIGHT: 66 IN | HEART RATE: 91 BPM | TEMPERATURE: 99 F | SYSTOLIC BLOOD PRESSURE: 146 MMHG

## 2017-07-20 PROCEDURE — 25000003 PHARM REV CODE 250: Performed by: ADVANCED PRACTICE MIDWIFE

## 2017-07-20 PROCEDURE — 99213 OFFICE O/P EST LOW 20 MIN: CPT | Mod: 25,TH,, | Performed by: ADVANCED PRACTICE MIDWIFE

## 2017-07-20 RX ORDER — CALCIUM CARBONATE 200(500)MG
500 TABLET,CHEWABLE ORAL DAILY PRN
Status: DISCONTINUED | OUTPATIENT
Start: 2017-07-20 | End: 2017-07-20 | Stop reason: HOSPADM

## 2017-07-20 RX ORDER — BUTALBITAL, ACETAMINOPHEN AND CAFFEINE 50; 325; 40 MG/1; MG/1; MG/1
1 TABLET ORAL EVERY 4 HOURS PRN
Qty: 30 TABLET | Refills: 0 | Status: SHIPPED | OUTPATIENT
Start: 2017-07-20 | End: 2017-08-19

## 2017-07-20 RX ORDER — NIFEDIPINE 60 MG/1
60 TABLET, EXTENDED RELEASE ORAL DAILY
Qty: 30 TABLET | Refills: 2 | Status: ON HOLD | OUTPATIENT
Start: 2017-07-20 | End: 2017-07-29 | Stop reason: HOSPADM

## 2017-07-20 RX ORDER — BUTALBITAL, ACETAMINOPHEN AND CAFFEINE 50; 325; 40 MG/1; MG/1; MG/1
1 TABLET ORAL ONCE
Status: COMPLETED | OUTPATIENT
Start: 2017-07-20 | End: 2017-07-20

## 2017-07-20 RX ADMIN — BUTALBITAL, ACETAMINOPHEN, AND CAFFEINE 1 TABLET: 50; 325; 40 TABLET ORAL at 10:07

## 2017-07-20 RX ADMIN — NIFEDIPINE 60 MG: 30 TABLET, FILM COATED, EXTENDED RELEASE ORAL at 10:07

## 2017-07-20 RX ADMIN — CALCIUM CARBONATE (ANTACID) CHEW TAB 500 MG 500 MG: 500 CHEW TAB at 01:07

## 2017-07-20 NOTE — NURSING
Informed patient that she has an appointment tomorrow, to start taking blood pressure medication as prescribed by doctor. Also informed her that she needs to start taking her valtrex daily to decrease the risk of an outbreak. Informed her that if outbreak happens and patient goes into labor that she can not deliver vaginally. Patient verbalizes understanding.

## 2017-07-20 NOTE — SUBJECTIVE & OBJECTIVE
Obstetric HPI:  Patient reports None contractions, active fetal movement, absent vaginal bleeding , absent loss of fluid      Objective:     Vital Signs (Most Recent):  Temp: 98.6 °F (37 °C) (07/19/17 2017)  Pulse: 78 (07/20/17 0519)  BP: 131/78 (07/20/17 0519) Vital Signs (24h Range):  Temp:  [98.6 °F (37 °C)] 98.6 °F (37 °C)  Pulse:  [] 78  BP: (112-170)/() 131/78     Weight: 119.7 kg (264 lb)  Body mass index is 42.61 kg/m².    FHT: 144Cat 1 (reassuring)  TOCO:  Q 60 minutes    No intake or output data in the 24 hours ending 07/20/17 0606         Significant Labs:  Recent Lab Results       07/19/17 2020      Creatinine, Random Ur 251.2  Comment:  The random urine reference ranges provided were established   for 24 hour urine collections.  No reference ranges exist for  random urine specimens.  Correlate clinically.       Prot/Creat Ratio, Ur 0.12     Protein, Urine Random 31  Comment:  The random urine reference ranges provided were established   for 24 hour urine collections.  No reference ranges exist for  random urine specimens.  Correlate clinically.  (H)           Physical Exam:                       Musculoskeletal: She exhibits edema.

## 2017-07-20 NOTE — SUBJECTIVE & OBJECTIVE
Obstetric HPI:  Patient reports None contractions, active fetal movement, No vaginal bleeding , No loss of fluid     This pregnancy has been complicated by HSV,obesity, depression, elevated BP    Obstetric History       T0      L0     SAB0   TAB0   Ectopic0   Multiple0   Live Births0       # Outcome Date GA Lbr Goldy/2nd Weight Sex Delivery Anes PTL Lv   1 Current                 Past Medical History:   Diagnosis Date    Abnormal Pap smear of cervix     Herpes simplex virus (HSV) infection      No past surgical history on file.    PTA Medications   Medication Sig    prenatal vit#103-iron-FA () 27 mg iron- 1 mg Tab Take 1 tablet by mouth once daily.    promethazine (PROMETHEGAN) 25 MG suppository UNWRAP AND INSERT ONE SUPPOSITORY RECTALLY EVERY 6 HOURS AS NEEDED FOR NAUSEA    sertraline (ZOLOFT) 50 MG tablet Take 1 tablet (50 mg total) by mouth once daily.    valacyclovir (VALTREX) 500 MG tablet Take 1 tablet (500 mg total) by mouth once daily.       Review of patient's allergies indicates:  No Known Allergies     Family History     Problem Relation (Age of Onset)    Breast cancer Maternal Grandmother    Colon cancer Maternal Grandfather    Diabetes Maternal Grandmother        Social History Main Topics    Smoking status: Never Smoker    Smokeless tobacco: Never Used    Alcohol use No      Comment: social use     Drug use: No    Sexual activity: Yes     Partners: Male     Birth control/ protection: None     Review of Systems   Cardiovascular: Positive for leg swelling.   Neurological: Positive for headaches.      Objective:     Vital Signs (Most Recent):    Vital Signs (24h Range):  Pulse:  [] 81  BP: (117-152)/(73-96) 140/82        There is no height or weight on file to calculate BMI.    FHT: 158Cat 1 (reassuring)  TOCO:  Q 30 minutes    Physical Exam:   Constitutional: She is oriented to person, place, and time. She appears well-developed and well-nourished.     Eyes:  Conjunctivae are normal. Pupils are equal, round, and reactive to light.    Neck: Normal range of motion.    Cardiovascular: Normal rate and regular rhythm.     Pulmonary/Chest: Breath sounds normal.        Abdominal: Soft.     Genitourinary: Vagina normal and uterus normal.           Musculoskeletal: Normal range of motion and moves all extremeties.       Neurological: She is alert and oriented to person, place, and time.    Skin: Skin is warm.    Psychiatric: She has a normal mood and affect. Her behavior is normal. Thought content normal.       Cervix:  Dilation:    Effacement:    Station: Presentation:      Significant Labs:  Lab Results   Component Value Date    [facility]    HEPBSAG Negative 12/16/2016       I have personallly reviewed all pertinent lab results from the last 24 hours.

## 2017-07-20 NOTE — ASSESSMENT & PLAN NOTE
BP elevated   C/o headache  IUP 36w2d  P repeat P/C ratio .12  Monitor BP decreased overnight  Discharge home

## 2017-07-20 NOTE — PROGRESS NOTES
S c/o headache getting worse  O bp 140-160/  Cat 1 FHT's  Contractions none present  A gestational hypertension  Per Dr posadas  Begin procardia 60mg po now   medicate for pain

## 2017-07-20 NOTE — HPI
Patient was evaluated yesterday for elevated BP  P/C ratio .21  Returns today c/o headache and elevated BP at home

## 2017-07-20 NOTE — PLAN OF CARE
Problem: Patient Care Overview  Goal: Plan of Care Review  Outcome: Ongoing (interventions implemented as appropriate)  Presented to triage with elevated pressure and headache unrelieved by Tylenol. Kept for overnight observations of BP after dose of Procardia as well as Percocet-5 to resolve H/A. Rested throughout evening. FHT reassuring/reactive. Plans for D/C this morning as BP has resolved to WNL/labile and PCR is WNL.

## 2017-07-20 NOTE — H&P
[facility] -   Obstetrics  History & Physical    Patient Name: Juliet Jalloh  MRN: [id number]  Admission Date: 2017  Primary Care Provider: Primary Doctor No    Subjective:     Principal Problem:<principal problem not specified>    History of Present Illness:  Patient was evaluated yesterday for elevated BP  P/C ratio .21  Returns today c/o headache and elevated BP at home    Obstetric HPI:  Patient reports None contractions, active fetal movement, No vaginal bleeding , No loss of fluid     This pregnancy has been complicated by HSV,obesity, depression, elevated BP    Obstetric History       T0      L0     SAB0   TAB0   Ectopic0   Multiple0   Live Births0       # Outcome Date GA Lbr Goldy/2nd Weight Sex Delivery Anes PTL Lv   1 Current                 Past Medical History:   Diagnosis Date    Abnormal Pap smear of cervix     Herpes simplex virus (HSV) infection      No past surgical history on file.    PTA Medications   Medication Sig    prenatal vit#103-iron-FA () 27 mg iron- 1 mg Tab Take 1 tablet by mouth once daily.    promethazine (PROMETHEGAN) 25 MG suppository UNWRAP AND INSERT ONE SUPPOSITORY RECTALLY EVERY 6 HOURS AS NEEDED FOR NAUSEA    sertraline (ZOLOFT) 50 MG tablet Take 1 tablet (50 mg total) by mouth once daily.    valacyclovir (VALTREX) 500 MG tablet Take 1 tablet (500 mg total) by mouth once daily.       Review of patient's allergies indicates:  No Known Allergies     Family History     Problem Relation (Age of Onset)    Breast cancer Maternal Grandmother    Colon cancer Maternal Grandfather    Diabetes Maternal Grandmother        Social History Main Topics    Smoking status: Never Smoker    Smokeless tobacco: Never Used    Alcohol use No      Comment: social use     Drug use: No    Sexual activity: Yes     Partners: Male     Birth control/ protection: None     Review of Systems   Cardiovascular: Positive for leg swelling.   Neurological:  Positive for headaches.      Objective:     Vital Signs (Most Recent):    Vital Signs (24h Range):  Pulse:  [] 81  BP: (117-152)/(73-96) 140/82        There is no height or weight on file to calculate BMI.    FHT: 158Cat 1 (reassuring)  TOCO:  Q 30 minutes    Physical Exam:   Constitutional: She is oriented to person, place, and time. She appears well-developed and well-nourished.     Eyes: Conjunctivae are normal. Pupils are equal, round, and reactive to light.    Neck: Normal range of motion.    Cardiovascular: Normal rate and regular rhythm.     Pulmonary/Chest: Breath sounds normal.        Abdominal: Soft.     Genitourinary: Vagina normal and uterus normal.           Musculoskeletal: Normal range of motion and moves all extremeties.       Neurological: She is alert and oriented to person, place, and time.    Skin: Skin is warm.    Psychiatric: She has a normal mood and affect. Her behavior is normal. Thought content normal.       Cervix:  Dilation:    Effacement:    Station: Presentation:      Significant Labs:  Lab Results   Component Value Date    GROUPTRH O POS 2016    HEPBSAG Negative 2016       I have personallly reviewed all pertinent lab results from the last 24 hours.    Assessment/Plan:     22 y.o. female  at 36w2d for:    Elevated BP without diagnosis of hypertension    BP elevated   C/o headache  IUP 36w2d  P repeat P/C ratio  Monitor BP  observe            Lizbeth Thomson CNM  Obstetrics  [facility]

## 2017-07-21 ENCOUNTER — ROUTINE PRENATAL (OUTPATIENT)
Dept: OBSTETRICS AND GYNECOLOGY | Facility: CLINIC | Age: 22
End: 2017-07-21
Payer: MEDICAID

## 2017-07-21 VITALS
SYSTOLIC BLOOD PRESSURE: 140 MMHG | WEIGHT: 265.44 LBS | DIASTOLIC BLOOD PRESSURE: 88 MMHG | BODY MASS INDEX: 42.84 KG/M2

## 2017-07-21 DIAGNOSIS — O99.212 OBESITY AFFECTING PREGNANCY IN SECOND TRIMESTER: Primary | ICD-10-CM

## 2017-07-21 DIAGNOSIS — Z3A.36 36 WEEKS GESTATION OF PREGNANCY: ICD-10-CM

## 2017-07-21 LAB — BACTERIA SPEC AEROBE CULT: NORMAL

## 2017-07-21 PROCEDURE — 99212 OFFICE O/P EST SF 10 MIN: CPT | Mod: TH,S$PBB,, | Performed by: ADVANCED PRACTICE MIDWIFE

## 2017-07-21 PROCEDURE — 99211 OFF/OP EST MAY X REQ PHY/QHP: CPT | Mod: PBBFAC | Performed by: ADVANCED PRACTICE MIDWIFE

## 2017-07-21 PROCEDURE — 99999 PR PBB SHADOW E&M-EST. PATIENT-LVL I: CPT | Mod: PBBFAC,,, | Performed by: ADVANCED PRACTICE MIDWIFE

## 2017-07-21 NOTE — PROGRESS NOTES
Doing well, no complaints. Taking Procardia daily. Reports occ headache, but none currently. Denies visual disturbances, epigastric pain or increased swelling. DTRs normal bilaterally, no clonus.   Reviewed plan of care, sx to monitor for. Patient v/u.  Reviewed L&D precautions, kick counts. Has L&D phone # to call if concerns or questions.

## 2017-07-22 ENCOUNTER — TELEPHONE (OUTPATIENT)
Dept: OBSTETRICS AND GYNECOLOGY | Facility: HOSPITAL | Age: 22
End: 2017-07-22

## 2017-07-24 ENCOUNTER — HOSPITAL ENCOUNTER (INPATIENT)
Facility: HOSPITAL | Age: 22
LOS: 5 days | Discharge: HOME | End: 2017-07-29
Attending: OBSTETRICS & GYNECOLOGY | Admitting: OBSTETRICS & GYNECOLOGY
Payer: MEDICAID

## 2017-07-24 DIAGNOSIS — R03.0 ELEVATED BP WITHOUT DIAGNOSIS OF HYPERTENSION: ICD-10-CM

## 2017-07-24 DIAGNOSIS — O14.93 PRE-ECLAMPSIA IN THIRD TRIMESTER: ICD-10-CM

## 2017-07-24 DIAGNOSIS — Z98.891 STATUS POST PRIMARY LOW TRANSVERSE CESAREAN SECTION: Primary | ICD-10-CM

## 2017-07-24 PROBLEM — O99.820 GBS (GROUP B STREPTOCOCCUS CARRIER), +RV CULTURE, CURRENTLY PREGNANT: Status: ACTIVE | Noted: 2017-07-24

## 2017-07-24 LAB
ALBUMIN SERPL BCP-MCNC: 2.6 G/DL
ALP SERPL-CCNC: 192 U/L
ALT SERPL W/O P-5'-P-CCNC: <5 U/L
ANION GAP SERPL CALC-SCNC: 8 MMOL/L
AST SERPL-CCNC: 12 U/L
BASOPHILS # BLD AUTO: 0.01 K/UL
BASOPHILS NFR BLD: 0.2 %
BILIRUB SERPL-MCNC: 0.3 MG/DL
BUN SERPL-MCNC: 6 MG/DL
CALCIUM SERPL-MCNC: 8.8 MG/DL
CHLORIDE SERPL-SCNC: 106 MMOL/L
CO2 SERPL-SCNC: 24 MMOL/L
CREAT SERPL-MCNC: 0.8 MG/DL
CREAT UR-MCNC: >450 MG/DL
DIFFERENTIAL METHOD: ABNORMAL
EOSINOPHIL # BLD AUTO: 0.1 K/UL
EOSINOPHIL NFR BLD: 0.8 %
ERYTHROCYTE [DISTWIDTH] IN BLOOD BY AUTOMATED COUNT: 13.2 %
EST. GFR  (AFRICAN AMERICAN): >60 ML/MIN/1.73 M^2
EST. GFR  (NON AFRICAN AMERICAN): >60 ML/MIN/1.73 M^2
GLUCOSE SERPL-MCNC: 106 MG/DL
HCT VFR BLD AUTO: 29.2 %
HGB BLD-MCNC: 9.4 G/DL
LYMPHOCYTES # BLD AUTO: 1.8 K/UL
LYMPHOCYTES NFR BLD: 30.2 %
MCH RBC QN AUTO: 26.5 PG
MCHC RBC AUTO-ENTMCNC: 32.2 G/DL
MCV RBC AUTO: 82 FL
MONOCYTES # BLD AUTO: 0.5 K/UL
MONOCYTES NFR BLD: 7.8 %
NEUTROPHILS # BLD AUTO: 3.6 K/UL
NEUTROPHILS NFR BLD: 61 %
PLATELET # BLD AUTO: 301 K/UL
PMV BLD AUTO: 9.5 FL
POTASSIUM SERPL-SCNC: 3.6 MMOL/L
PROT SERPL-MCNC: 7 G/DL
PROT UR-MCNC: 140 MG/DL
PROT/CREAT RATIO, UR: ABNORMAL
RBC # BLD AUTO: 3.55 M/UL
SODIUM SERPL-SCNC: 138 MMOL/L
WBC # BLD AUTO: 5.9 K/UL

## 2017-07-24 PROCEDURE — 85025 COMPLETE CBC W/AUTO DIFF WBC: CPT

## 2017-07-24 PROCEDURE — 86900 BLOOD TYPING SEROLOGIC ABO: CPT

## 2017-07-24 PROCEDURE — 84156 ASSAY OF PROTEIN URINE: CPT

## 2017-07-24 PROCEDURE — 11000001 HC ACUTE MED/SURG PRIVATE ROOM

## 2017-07-24 PROCEDURE — 59025 FETAL NON-STRESS TEST: CPT

## 2017-07-24 PROCEDURE — 63600175 PHARM REV CODE 636 W HCPCS: Performed by: ADVANCED PRACTICE MIDWIFE

## 2017-07-24 PROCEDURE — 99211 OFF/OP EST MAY X REQ PHY/QHP: CPT | Mod: 25

## 2017-07-24 PROCEDURE — 80053 COMPREHEN METABOLIC PANEL: CPT

## 2017-07-24 PROCEDURE — 72100003 HC LABOR CARE, EA. ADDL. 8 HRS

## 2017-07-24 PROCEDURE — 86901 BLOOD TYPING SEROLOGIC RH(D): CPT

## 2017-07-24 PROCEDURE — 25000003 PHARM REV CODE 250: Performed by: ADVANCED PRACTICE MIDWIFE

## 2017-07-24 PROCEDURE — 72100002 HC LABOR CARE, 1ST 8 HOURS

## 2017-07-24 RX ORDER — BUTORPHANOL TARTRATE 1 MG/ML
2 INJECTION INTRAMUSCULAR; INTRAVENOUS ONCE
Status: COMPLETED | OUTPATIENT
Start: 2017-07-24 | End: 2017-07-24

## 2017-07-24 RX ORDER — ONDANSETRON 8 MG/1
8 TABLET, ORALLY DISINTEGRATING ORAL EVERY 8 HOURS PRN
Status: DISCONTINUED | OUTPATIENT
Start: 2017-07-24 | End: 2017-07-24

## 2017-07-24 RX ORDER — TERBUTALINE SULFATE 1 MG/ML
0.25 INJECTION SUBCUTANEOUS
Status: DISCONTINUED | OUTPATIENT
Start: 2017-07-24 | End: 2017-07-25

## 2017-07-24 RX ORDER — ACETAMINOPHEN 325 MG/1
650 TABLET ORAL EVERY 6 HOURS PRN
Status: DISCONTINUED | OUTPATIENT
Start: 2017-07-24 | End: 2017-07-25

## 2017-07-24 RX ORDER — PROMETHAZINE HYDROCHLORIDE 25 MG/ML
12.5 INJECTION, SOLUTION INTRAMUSCULAR; INTRAVENOUS ONCE
Status: COMPLETED | OUTPATIENT
Start: 2017-07-24 | End: 2017-07-24

## 2017-07-24 RX ORDER — MAG HYDROX/ALUMINUM HYD/SIMETH 200-200-20
30 SUSPENSION, ORAL (FINAL DOSE FORM) ORAL ONCE
Status: COMPLETED | OUTPATIENT
Start: 2017-07-24 | End: 2017-07-24

## 2017-07-24 RX ORDER — ACETAMINOPHEN 500 MG
500 TABLET ORAL EVERY 6 HOURS PRN
Status: DISCONTINUED | OUTPATIENT
Start: 2017-07-24 | End: 2017-07-24

## 2017-07-24 RX ORDER — MISOPROSTOL 100 MCG
25 TABLET ORAL EVERY 4 HOURS
Status: DISCONTINUED | OUTPATIENT
Start: 2017-07-24 | End: 2017-07-25

## 2017-07-24 RX ORDER — MISOPROSTOL 100 MCG
25 TABLET ORAL EVERY 4 HOURS
Status: DISCONTINUED | OUTPATIENT
Start: 2017-07-24 | End: 2017-07-24

## 2017-07-24 RX ORDER — CYCLOBENZAPRINE HCL 10 MG
10 TABLET ORAL ONCE
Status: DISCONTINUED | OUTPATIENT
Start: 2017-07-24 | End: 2017-07-24

## 2017-07-24 RX ORDER — MISOPROSTOL 200 UG/1
600 TABLET ORAL
Status: DISCONTINUED | OUTPATIENT
Start: 2017-07-24 | End: 2017-07-25

## 2017-07-24 RX ORDER — ONDANSETRON 8 MG/1
8 TABLET, ORALLY DISINTEGRATING ORAL EVERY 8 HOURS PRN
Status: DISCONTINUED | OUTPATIENT
Start: 2017-07-24 | End: 2017-07-25

## 2017-07-24 RX ADMIN — Medication 25 MCG: at 07:07

## 2017-07-24 RX ADMIN — DEXTROSE 2.5 MILLION UNITS: 50 INJECTION, SOLUTION INTRAVENOUS at 11:07

## 2017-07-24 RX ADMIN — Medication 25 MCG: at 11:07

## 2017-07-24 RX ADMIN — ACETAMINOPHEN 650 MG: 325 TABLET ORAL at 02:07

## 2017-07-24 RX ADMIN — PENICILLIN G POTASSIUM 5 MILLION UNITS: 5000000 POWDER, FOR SOLUTION INTRAMUSCULAR; INTRAPLEURAL; INTRATHECAL; INTRAVENOUS at 11:07

## 2017-07-24 RX ADMIN — ALUMINUM HYDROXIDE, MAGNESIUM HYDROXIDE, AND SIMETHICONE 30 ML: 200; 200; 20 SUSPENSION ORAL at 11:07

## 2017-07-24 RX ADMIN — Medication 25 MCG: at 03:07

## 2017-07-24 RX ADMIN — PROMETHAZINE HYDROCHLORIDE 12.5 MG: 25 INJECTION INTRAMUSCULAR; INTRAVENOUS at 07:07

## 2017-07-24 RX ADMIN — DEXTROSE 2.5 MILLION UNITS: 50 INJECTION, SOLUTION INTRAVENOUS at 03:07

## 2017-07-24 RX ADMIN — DEXTROSE 2.5 MILLION UNITS: 50 INJECTION, SOLUTION INTRAVENOUS at 07:07

## 2017-07-24 RX ADMIN — ONDANSETRON 8 MG: 8 TABLET, ORALLY DISINTEGRATING ORAL at 11:07

## 2017-07-24 RX ADMIN — SODIUM CHLORIDE, SODIUM LACTATE, POTASSIUM CHLORIDE, AND CALCIUM CHLORIDE 1000 ML: .6; .31; .03; .02 INJECTION, SOLUTION INTRAVENOUS at 11:07

## 2017-07-24 RX ADMIN — BUTORPHANOL TARTRATE 2 MG: 1 INJECTION, SOLUTION INTRAMUSCULAR; INTRAVENOUS at 07:07

## 2017-07-24 NOTE — SUBJECTIVE & OBJECTIVE
Obstetric HPI:  Patient reports None contractions, active fetal movement, No vaginal bleeding , No loss of fluid     This pregnancy has been complicated by obesity, HSV- no outbreak at present, anemia, depression and elevated BP past couple weeks    Obstetric History       T0      L0     SAB0   TAB0   Ectopic0   Multiple0   Live Births0       # Outcome Date GA Lbr Goldy/2nd Weight Sex Delivery Anes PTL Lv   1 Current                 Past Medical History:   Diagnosis Date    Abnormal Pap smear of cervix     Herpes simplex virus (HSV) infection      No past surgical history on file.    PTA Medications   Medication Sig    butalbital-acetaminophen-caffeine -40 mg (FIORICET, ESGIC) -40 mg per tablet Take 1 tablet by mouth every 4 (four) hours as needed for Pain.    nifedipine (PROCARDIA-XL) 60 MG (OSM) 24 hr tablet Take 1 tablet (60 mg total) by mouth once daily.    prenatal vit#103-iron-FA () 27 mg iron- 1 mg Tab Take 1 tablet by mouth once daily.    promethazine (PROMETHEGAN) 25 MG suppository UNWRAP AND INSERT ONE SUPPOSITORY RECTALLY EVERY 6 HOURS AS NEEDED FOR NAUSEA    sertraline (ZOLOFT) 50 MG tablet Take 1 tablet (50 mg total) by mouth once daily.    valacyclovir (VALTREX) 500 MG tablet Take 1 tablet (500 mg total) by mouth once daily.       Review of patient's allergies indicates:  No Known Allergies     Family History     Problem Relation (Age of Onset)    Breast cancer Maternal Grandmother    Colon cancer Maternal Grandfather    Diabetes Maternal Grandmother        Social History Main Topics    Smoking status: Never Smoker    Smokeless tobacco: Never Used    Alcohol use No      Comment: social use     Drug use: No    Sexual activity: Yes     Partners: Male     Birth control/ protection: None     Review of Systems   Objective:     Vital Signs (Most Recent):  Temp: 98.2 °F (36.8 °C) (17)  Pulse: 89 (17)  BP: (!) 159/98 (17) Vital Signs  (24h Range):  Temp:  [98.2 °F (36.8 °C)] 98.2 °F (36.8 °C)  Pulse:  [85-92] 89  BP: (121-162)/() 159/98     Weight: 120.4 kg (265 lb 6.9 oz)  Body mass index is 42.84 kg/m².    FHT: 130 bpm reactiveCat 1 (reassuring)  TOCO:  Q none minutes    Physical Exam:   Constitutional: She is oriented to person, place, and time. She appears well-developed and well-nourished.       Cardiovascular: Exam reveals edema.     Pulmonary/Chest: Effort normal and breath sounds normal.        Abdominal: Soft. There is no tenderness.             Musculoskeletal: Normal range of motion. She exhibits edema.       Neurological: She is alert and oriented to person, place, and time. She displays abnormal reflex.   3+    Skin: Skin is warm and dry.    Psychiatric: She has a normal mood and affect. Her behavior is normal. Judgment and thought content normal.       Cervix:  deferred       Significant Labs:  Lab Results   Component Value Date    GROUPTRH O POS 12/16/2016    HEPBSAG Negative 12/16/2016    STREPBCULT STREPTOCOCCUS AGALACTIAE (GROUP B) 07/18/2017       I have personallly reviewed all pertinent lab results from the last 24 hours.

## 2017-07-24 NOTE — ASSESSMENT & PLAN NOTE
Labile BP  PIH work up with NST and serial BP    See problem -Pre-eclampsia- Cytotec IOL per protocol

## 2017-07-24 NOTE — PLAN OF CARE
Problem: Patient Care Overview  Goal: Plan of Care Review  Outcome: Ongoing (interventions implemented as appropriate)  Induction for PIH. VSS. Cytotec x2 doses given. Denies any needs at this time. Will continue to monitor. Anticipate .

## 2017-07-24 NOTE — PROGRESS NOTES
[facility]  Obstetrics  Labor Progress Note    Patient Name: Juliet Jalloh  MRN: [id number]  Admission Date: 2017  Hospital Length of Stay: 0 days  Attending Physician: Gale Engle MD  Primary Care Provider: Primary Doctor No    Subjective:     Principal Problem:Pre-eclampsia in third trimester    Hospital Course:  17 at 0745hrs  PIH work up with serial BP and NST    1040hrs- Elevated creatinine with labile elevated BP and symptomatic  D/W Dr Rios- proceed with Cytotec IOL per protocol    Interval History:  Juliet is a 22 y.o.  at 37w0d. She is C/O nausea    Objective:     Vital Signs (Most Recent):  Temp: 98.2 °F (36.8 °C) (17 0743)  Pulse: 89 (17 1004)  BP: (!) 148/111 (17 1004) Vital Signs (24h Range):  Temp:  [98.2 °F (36.8 °C)] 98.2 °F (36.8 °C)  Pulse:  [85-94] 89  BP: (121-162)/() 148/111     Weight: 120.4 kg (265 lb 6.9 oz)  Body mass index is 42.84 kg/m².    FHT: 130bpm reactiveCat 1 (reassuring)  TOCO:  Q none minutes    Cervical Exam: To be examined        Significant Labs:  Lab Results   Component Value Date    GROUPTRH O POS 2016    HEPBSAG Negative 2016    STREPBCULT STREPTOCOCCUS AGALACTIAE (GROUP B) 2017       I have personallly reviewed all pertinent lab results from the last 24 hours.  Which reveal anemia, normal AST/ALT but elevated creatinine  Physical Exam    Assessment/Plan:     22 y.o. female  at 37w0d for:    * Pre-eclampsia in third trimester    Admit per protocol  Start Cytotec IOL per protocol  Serial BP        GBS (group B Streptococcus carrier), +RV culture, currently pregnant    Start PCN prophylaxis per protocol with start of regular contractions and/or SROM        Elevated BP without diagnosis of hypertension    Labile BP  PIH work up with NST and serial BP    See problem -Pre-eclampsia- Cytotec IOL per protocol        Herpes simplex virus (HSV) infection    No outbreak- taking  suppression RX past month              Wendy Payan CNM  Obstetrics  [facility]

## 2017-07-24 NOTE — H&P
[facility] -   Obstetrics  History & Physical    Patient Name: Juliet Jalloh  MRN: [id number]  Admission Date: 2017  Primary Care Provider: Primary Doctor No    Subjective:     Principal Problem:Elevated BP without diagnosis of hypertension    History of Present Illness:  17 at 0745hrs   22yr old  with IUP 37w0d  BP at home 150/111, with H/A and nausea  Started on Procardia 60mg QD last week        Obstetric HPI:  Patient reports None contractions, active fetal movement, No vaginal bleeding , No loss of fluid     This pregnancy has been complicated by obesity, HSV- no outbreak at present, anemia, depression and elevated BP past couple weeks    Obstetric History       T0      L0     SAB0   TAB0   Ectopic0   Multiple0   Live Births0       # Outcome Date GA Lbr Goldy/2nd Weight Sex Delivery Anes PTL Lv   1 Current                 Past Medical History:   Diagnosis Date    Abnormal Pap smear of cervix     Herpes simplex virus (HSV) infection      No past surgical history on file.    PTA Medications   Medication Sig    butalbital-acetaminophen-caffeine -40 mg (FIORICET, ESGIC) -40 mg per tablet Take 1 tablet by mouth every 4 (four) hours as needed for Pain.    nifedipine (PROCARDIA-XL) 60 MG (OSM) 24 hr tablet Take 1 tablet (60 mg total) by mouth once daily.    prenatal vit#103-iron-FA () 27 mg iron- 1 mg Tab Take 1 tablet by mouth once daily.    promethazine (PROMETHEGAN) 25 MG suppository UNWRAP AND INSERT ONE SUPPOSITORY RECTALLY EVERY 6 HOURS AS NEEDED FOR NAUSEA    sertraline (ZOLOFT) 50 MG tablet Take 1 tablet (50 mg total) by mouth once daily.    valacyclovir (VALTREX) 500 MG tablet Take 1 tablet (500 mg total) by mouth once daily.       Review of patient's allergies indicates:  No Known Allergies     Family History     Problem Relation (Age of Onset)    Breast cancer Maternal Grandmother    Colon cancer Maternal Grandfather    Diabetes  Maternal Grandmother        Social History Main Topics    Smoking status: Never Smoker    Smokeless tobacco: Never Used    Alcohol use No      Comment: social use     Drug use: No    Sexual activity: Yes     Partners: Male     Birth control/ protection: None     Review of Systems   Objective:     Vital Signs (Most Recent):  Temp: 98.2 °F (36.8 °C) (17 0743)  Pulse: 89 (17 0919)  BP: (!) 159/98 (17) Vital Signs (24h Range):  Temp:  [98.2 °F (36.8 °C)] 98.2 °F (36.8 °C)  Pulse:  [85-92] 89  BP: (121-162)/() 159/98     Weight: 120.4 kg (265 lb 6.9 oz)  Body mass index is 42.84 kg/m².    FHT: 130 bpm reactiveCat 1 (reassuring)  TOCO:  Q none minutes    Physical Exam:   Constitutional: She is oriented to person, place, and time. She appears well-developed and well-nourished.       Cardiovascular: Exam reveals edema.     Pulmonary/Chest: Effort normal and breath sounds normal.        Abdominal: Soft. There is no tenderness.             Musculoskeletal: Normal range of motion. She exhibits edema.       Neurological: She is alert and oriented to person, place, and time. She displays abnormal reflex.   3+    Skin: Skin is warm and dry.    Psychiatric: She has a normal mood and affect. Her behavior is normal. Judgment and thought content normal.       Cervix:  deferred       Significant Labs:  Lab Results   Component Value Date    GROUPTRH O POS 2016    HEPBSAG Negative 2016    STREPBCULT STREPTOCOCCUS AGALACTIAE (GROUP B) 2017       I have personallly reviewed all pertinent lab results from the last 24 hours.    Assessment/Plan:     22 y.o. female  at 37w0d for:    * Elevated BP without diagnosis of hypertension    Labile BP  PIH work up with NST and serial BP        Herpes simplex virus (HSV) infection    No outbreak- taking suppression RX past month            Wendy Payan CNM  Obstetrics  [facility]

## 2017-07-24 NOTE — HPI
22yr old  with IUP 37w0d presented with BP at home of 150/111 and with complaints of H/A and nausea.  Pt was started on Procardia 60mg QD last week.

## 2017-07-24 NOTE — SUBJECTIVE & OBJECTIVE
Interval History:  Juliet is a 22 y.o.  at 37w0d. She is C/O nausea    Objective:     Vital Signs (Most Recent):  Temp: 98.2 °F (36.8 °C) (17 0743)  Pulse: 89 (17 1004)  BP: (!) 148/111 (17 1004) Vital Signs (24h Range):  Temp:  [98.2 °F (36.8 °C)] 98.2 °F (36.8 °C)  Pulse:  [85-94] 89  BP: (121-162)/() 148/111     Weight: 120.4 kg (265 lb 6.9 oz)  Body mass index is 42.84 kg/m².    FHT: 130bpm reactiveCat 1 (reassuring)  TOCO:  Q none minutes    Cervical Exam: To be examined        Significant Labs:  Lab Results   Component Value Date    GROUPTRH O POS 2016    HEPBSAG Negative 2016    STREPBCULT STREPTOCOCCUS AGALACTIAE (GROUP B) 2017       I have personallly reviewed all pertinent lab results from the last 24 hours.  Which reveal anemia, normal AST/ALT but elevated creatinine  Physical Exam

## 2017-07-24 NOTE — HOSPITAL COURSE
Pt was admitted for IOL at 37 w0d secondary to Pre-eclampsia.  Pt was started on Cytotec induction.  Labor progressed but eventually stalled.  PLTCS was performed without complications on 7/25/17 and pt placed on MgSO4 for 24hours.     During PP stay, bp were stable, pt did not require antihypertensives.  She was evaluated for depression. Restarted her zoloft on her request. She had no SI HI and during her stay her mood improved. She admitted to being dissatisfied with the FOB and his lack of maturity and help, however she has a best friend and mother that are a great support. She was given depression precautions w reasons to RTER immed. She will f/u this wk w Lizbeth for bandage removal, bp ck and zoloft f/u.

## 2017-07-24 NOTE — PROGRESS NOTES
"Discussed feeding choice with mother.  Reviewed benefits of breastfeeding.  Patient given "What to Expect in the First 48 Hours" handout. Mother states her intention is to breastfeed.    Coffective counseling sheet Fall in Love discussed with mother. Reinforced immediate skin to skin, the magic first hour, importance of the first feeding and delaying routine procedures. Encouraged mother to download Coffective mobile lit if she has not already done so. Mother verbalies understanding.    "

## 2017-07-25 ENCOUNTER — ANESTHESIA (OUTPATIENT)
Dept: OBSTETRICS AND GYNECOLOGY | Facility: HOSPITAL | Age: 22
End: 2017-07-25
Payer: MEDICAID

## 2017-07-25 ENCOUNTER — ANESTHESIA EVENT (OUTPATIENT)
Dept: OBSTETRICS AND GYNECOLOGY | Facility: HOSPITAL | Age: 22
End: 2017-07-25
Payer: MEDICAID

## 2017-07-25 ENCOUNTER — SURGERY (OUTPATIENT)
Age: 22
End: 2017-07-25

## 2017-07-25 PROBLEM — O99.213 OBESITY AFFECTING PREGNANCY IN THIRD TRIMESTER: Status: ACTIVE | Noted: 2017-05-09

## 2017-07-25 PROBLEM — Z98.891 STATUS POST PRIMARY LOW TRANSVERSE CESAREAN SECTION: Status: ACTIVE | Noted: 2017-07-25

## 2017-07-25 LAB
ABO + RH BLD: NORMAL
BLD GP AB SCN CELLS X3 SERPL QL: NORMAL

## 2017-07-25 PROCEDURE — 25000003 PHARM REV CODE 250: Performed by: NURSE ANESTHETIST, CERTIFIED REGISTERED

## 2017-07-25 PROCEDURE — 37000009 HC ANESTHESIA EA ADD 15 MINS: Performed by: OBSTETRICS & GYNECOLOGY

## 2017-07-25 PROCEDURE — 59514 CESAREAN DELIVERY ONLY: CPT | Mod: AT,,, | Performed by: OBSTETRICS & GYNECOLOGY

## 2017-07-25 PROCEDURE — 36000685 HC CESAREAN SECTION LEVEL I

## 2017-07-25 PROCEDURE — 51702 INSERT TEMP BLADDER CATH: CPT

## 2017-07-25 PROCEDURE — 63600175 PHARM REV CODE 636 W HCPCS: Performed by: NURSE ANESTHETIST, CERTIFIED REGISTERED

## 2017-07-25 PROCEDURE — 27800517 HC TRAY,EPIDURAL-CONTINUOUS: Performed by: NURSE ANESTHETIST, CERTIFIED REGISTERED

## 2017-07-25 PROCEDURE — 3E033VJ INTRODUCTION OF OTHER HORMONE INTO PERIPHERAL VEIN, PERCUTANEOUS APPROACH: ICD-10-PCS | Performed by: OBSTETRICS & GYNECOLOGY

## 2017-07-25 PROCEDURE — 37000008 HC ANESTHESIA 1ST 15 MINUTES: Performed by: OBSTETRICS & GYNECOLOGY

## 2017-07-25 PROCEDURE — 63600175 PHARM REV CODE 636 W HCPCS: Performed by: OBSTETRICS & GYNECOLOGY

## 2017-07-25 PROCEDURE — 63600175 PHARM REV CODE 636 W HCPCS: Performed by: ADVANCED PRACTICE MIDWIFE

## 2017-07-25 PROCEDURE — 25000003 PHARM REV CODE 250: Performed by: ADVANCED PRACTICE MIDWIFE

## 2017-07-25 PROCEDURE — 25000003 PHARM REV CODE 250: Performed by: OBSTETRICS & GYNECOLOGY

## 2017-07-25 PROCEDURE — 72100003 HC LABOR CARE, EA. ADDL. 8 HRS

## 2017-07-25 PROCEDURE — 11000001 HC ACUTE MED/SURG PRIVATE ROOM

## 2017-07-25 PROCEDURE — 51701 INSERT BLADDER CATHETER: CPT

## 2017-07-25 PROCEDURE — 62326 NJX INTERLAMINAR LMBR/SAC: CPT | Performed by: ANESTHESIOLOGY

## 2017-07-25 RX ORDER — PROMETHAZINE HYDROCHLORIDE 25 MG/1
25 TABLET ORAL EVERY 6 HOURS PRN
Status: DISCONTINUED | OUTPATIENT
Start: 2017-07-25 | End: 2017-07-29 | Stop reason: HOSPADM

## 2017-07-25 RX ORDER — LABETALOL HYDROCHLORIDE 5 MG/ML
20 INJECTION, SOLUTION INTRAVENOUS ONCE AS NEEDED
Status: ACTIVE | OUTPATIENT
Start: 2017-07-25 | End: 2017-07-25

## 2017-07-25 RX ORDER — ROPIVACAINE IN 0.9% SOD CHL/PF 0.2 %
PLASTIC BAG, INJECTION (ML) EPIDURAL CONTINUOUS
Status: DISCONTINUED | OUTPATIENT
Start: 2017-07-25 | End: 2017-07-25

## 2017-07-25 RX ORDER — ROPIVACAINE HYDROCHLORIDE 2 MG/ML
INJECTION, SOLUTION EPIDURAL; INFILTRATION; PERINEURAL CONTINUOUS PRN
Status: DISCONTINUED | OUTPATIENT
Start: 2017-07-25 | End: 2017-07-25

## 2017-07-25 RX ORDER — CEFAZOLIN SODIUM 2 G/50ML
2 SOLUTION INTRAVENOUS
Status: DISCONTINUED | OUTPATIENT
Start: 2017-07-25 | End: 2017-07-25

## 2017-07-25 RX ORDER — LIDOCAINE HCL/PF 100 MG/5ML
SYRINGE (ML) INTRAVENOUS
Status: DISCONTINUED | OUTPATIENT
Start: 2017-07-25 | End: 2017-07-25

## 2017-07-25 RX ORDER — HYDROMORPHONE HYDROCHLORIDE 2 MG/ML
INJECTION, SOLUTION INTRAMUSCULAR; INTRAVENOUS; SUBCUTANEOUS
Status: DISCONTINUED | OUTPATIENT
Start: 2017-07-25 | End: 2017-07-25

## 2017-07-25 RX ORDER — FAMOTIDINE 10 MG/ML
20 INJECTION INTRAVENOUS
Status: DISCONTINUED | OUTPATIENT
Start: 2017-07-25 | End: 2017-07-29 | Stop reason: HOSPADM

## 2017-07-25 RX ORDER — PROMETHAZINE HYDROCHLORIDE 25 MG/ML
12.5 INJECTION, SOLUTION INTRAMUSCULAR; INTRAVENOUS ONCE
Status: COMPLETED | OUTPATIENT
Start: 2017-07-25 | End: 2017-07-25

## 2017-07-25 RX ORDER — ONDANSETRON 2 MG/ML
INJECTION INTRAMUSCULAR; INTRAVENOUS
Status: DISCONTINUED | OUTPATIENT
Start: 2017-07-25 | End: 2017-07-25

## 2017-07-25 RX ORDER — BUTORPHANOL TARTRATE 1 MG/ML
2 INJECTION INTRAMUSCULAR; INTRAVENOUS ONCE
Status: COMPLETED | OUTPATIENT
Start: 2017-07-25 | End: 2017-07-25

## 2017-07-25 RX ORDER — HYDROCORTISONE 25 MG/G
CREAM TOPICAL 3 TIMES DAILY PRN
Status: DISCONTINUED | OUTPATIENT
Start: 2017-07-25 | End: 2017-07-29 | Stop reason: HOSPADM

## 2017-07-25 RX ORDER — SODIUM CHLORIDE, SODIUM LACTATE, POTASSIUM CHLORIDE, CALCIUM CHLORIDE 600; 310; 30; 20 MG/100ML; MG/100ML; MG/100ML; MG/100ML
INJECTION, SOLUTION INTRAVENOUS CONTINUOUS
Status: DISCONTINUED | OUTPATIENT
Start: 2017-07-25 | End: 2017-07-25 | Stop reason: HOSPADM

## 2017-07-25 RX ORDER — CALCIUM GLUCONATE 98 MG/ML
1 INJECTION, SOLUTION INTRAVENOUS
Status: DISCONTINUED | OUTPATIENT
Start: 2017-07-25 | End: 2017-07-29 | Stop reason: HOSPADM

## 2017-07-25 RX ORDER — LIDOCAINE HYDROCHLORIDE AND EPINEPHRINE 15; 5 MG/ML; UG/ML
INJECTION, SOLUTION EPIDURAL
Status: DISCONTINUED | OUTPATIENT
Start: 2017-07-25 | End: 2017-07-25

## 2017-07-25 RX ORDER — PRENATAL WITH FERROUS FUM AND FOLIC ACID 3080; 920; 120; 400; 22; 1.84; 3; 20; 10; 1; 12; 200; 27; 25; 2 [IU]/1; [IU]/1; MG/1; [IU]/1; MG/1; MG/1; MG/1; MG/1; MG/1; MG/1; UG/1; MG/1; MG/1; MG/1; MG/1
1 TABLET ORAL DAILY
Status: DISCONTINUED | OUTPATIENT
Start: 2017-07-26 | End: 2017-07-29 | Stop reason: HOSPADM

## 2017-07-25 RX ORDER — OXYCODONE AND ACETAMINOPHEN 10; 325 MG/1; MG/1
1 TABLET ORAL EVERY 4 HOURS PRN
Status: DISCONTINUED | OUTPATIENT
Start: 2017-07-25 | End: 2017-07-27

## 2017-07-25 RX ORDER — ROPIVACAINE HYDROCHLORIDE 2 MG/ML
INJECTION, SOLUTION EPIDURAL; INFILTRATION; PERINEURAL
Status: DISCONTINUED | OUTPATIENT
Start: 2017-07-25 | End: 2017-07-25

## 2017-07-25 RX ORDER — DOCUSATE SODIUM 100 MG/1
100 CAPSULE, LIQUID FILLED ORAL DAILY
Status: DISCONTINUED | OUTPATIENT
Start: 2017-07-26 | End: 2017-07-29 | Stop reason: HOSPADM

## 2017-07-25 RX ORDER — MAGNESIUM SULFATE HEPTAHYDRATE 40 MG/ML
2 INJECTION, SOLUTION INTRAVENOUS ONCE
Status: COMPLETED | OUTPATIENT
Start: 2017-07-25 | End: 2017-07-25

## 2017-07-25 RX ORDER — SODIUM CHLORIDE, SODIUM LACTATE, POTASSIUM CHLORIDE, CALCIUM CHLORIDE 600; 310; 30; 20 MG/100ML; MG/100ML; MG/100ML; MG/100ML
INJECTION, SOLUTION INTRAVENOUS CONTINUOUS
Status: DISCONTINUED | OUTPATIENT
Start: 2017-07-25 | End: 2017-07-25

## 2017-07-25 RX ORDER — LIDOCAINE HYDROCHLORIDE 20 MG/ML
INJECTION, SOLUTION EPIDURAL; INFILTRATION; INTRACAUDAL; PERINEURAL
Status: DISCONTINUED | OUTPATIENT
Start: 2017-07-25 | End: 2017-07-25

## 2017-07-25 RX ORDER — ROPIVACAINE HYDROCHLORIDE 5 MG/ML
INJECTION, SOLUTION EPIDURAL; INFILTRATION; PERINEURAL
Status: DISCONTINUED | OUTPATIENT
Start: 2017-07-25 | End: 2017-07-25

## 2017-07-25 RX ORDER — SUCCINYLCHOLINE CHLORIDE 20 MG/ML
INJECTION INTRAMUSCULAR; INTRAVENOUS
Status: DISCONTINUED | OUTPATIENT
Start: 2017-07-25 | End: 2017-07-25

## 2017-07-25 RX ORDER — BISACODYL 10 MG
10 SUPPOSITORY, RECTAL RECTAL ONCE AS NEEDED
Status: ACTIVE | OUTPATIENT
Start: 2017-07-25 | End: 2017-07-25

## 2017-07-25 RX ORDER — DIPHENHYDRAMINE HYDROCHLORIDE 50 MG/ML
25 INJECTION INTRAMUSCULAR; INTRAVENOUS EVERY 4 HOURS PRN
Status: DISCONTINUED | OUTPATIENT
Start: 2017-07-25 | End: 2017-07-29 | Stop reason: HOSPADM

## 2017-07-25 RX ORDER — MAGNESIUM SULFATE HEPTAHYDRATE 40 MG/ML
2 INJECTION, SOLUTION INTRAVENOUS CONTINUOUS
Status: DISCONTINUED | OUTPATIENT
Start: 2017-07-25 | End: 2017-07-27

## 2017-07-25 RX ORDER — FENTANYL CITRATE 50 UG/ML
100 INJECTION, SOLUTION INTRAMUSCULAR; INTRAVENOUS ONCE
Status: COMPLETED | OUTPATIENT
Start: 2017-07-25 | End: 2017-07-25

## 2017-07-25 RX ORDER — MISOPROSTOL 200 UG/1
200 TABLET ORAL ONCE AS NEEDED
Status: ACTIVE | OUTPATIENT
Start: 2017-07-25 | End: 2017-07-25

## 2017-07-25 RX ORDER — OXYTOCIN/RINGER'S LACTATE 20/1000 ML
41.65 PLASTIC BAG, INJECTION (ML) INTRAVENOUS CONTINUOUS
Status: ACTIVE | OUTPATIENT
Start: 2017-07-25 | End: 2017-07-26

## 2017-07-25 RX ORDER — PROPOFOL 10 MG/ML
VIAL (ML) INTRAVENOUS
Status: DISCONTINUED | OUTPATIENT
Start: 2017-07-25 | End: 2017-07-25

## 2017-07-25 RX ORDER — FENTANYL CITRATE 50 UG/ML
INJECTION, SOLUTION INTRAMUSCULAR; INTRAVENOUS
Status: DISCONTINUED | OUTPATIENT
Start: 2017-07-25 | End: 2017-07-25

## 2017-07-25 RX ORDER — SIMETHICONE 80 MG
1 TABLET,CHEWABLE ORAL EVERY 6 HOURS PRN
Status: DISCONTINUED | OUTPATIENT
Start: 2017-07-25 | End: 2017-07-29 | Stop reason: HOSPADM

## 2017-07-25 RX ORDER — AMOXICILLIN 250 MG
1 CAPSULE ORAL NIGHTLY PRN
Status: DISCONTINUED | OUTPATIENT
Start: 2017-07-25 | End: 2017-07-29 | Stop reason: HOSPADM

## 2017-07-25 RX ORDER — SODIUM CITRATE AND CITRIC ACID MONOHYDRATE 334; 500 MG/5ML; MG/5ML
30 SOLUTION ORAL
Status: DISCONTINUED | OUTPATIENT
Start: 2017-07-25 | End: 2017-07-25

## 2017-07-25 RX ORDER — DIPHENHYDRAMINE HCL 25 MG
25 CAPSULE ORAL EVERY 4 HOURS PRN
Status: DISCONTINUED | OUTPATIENT
Start: 2017-07-25 | End: 2017-07-29 | Stop reason: HOSPADM

## 2017-07-25 RX ORDER — SODIUM CHLORIDE, SODIUM LACTATE, POTASSIUM CHLORIDE, CALCIUM CHLORIDE 600; 310; 30; 20 MG/100ML; MG/100ML; MG/100ML; MG/100ML
1000 INJECTION, SOLUTION INTRAVENOUS CONTINUOUS
Status: ACTIVE | OUTPATIENT
Start: 2017-07-25 | End: 2017-07-26

## 2017-07-25 RX ORDER — OXYTOCIN/RINGER'S LACTATE 20/1000 ML
2 PLASTIC BAG, INJECTION (ML) INTRAVENOUS CONTINUOUS
Status: DISCONTINUED | OUTPATIENT
Start: 2017-07-25 | End: 2017-07-25

## 2017-07-25 RX ORDER — SODIUM CHLORIDE, SODIUM LACTATE, POTASSIUM CHLORIDE, CALCIUM CHLORIDE 600; 310; 30; 20 MG/100ML; MG/100ML; MG/100ML; MG/100ML
INJECTION, SOLUTION INTRAVENOUS ONCE
Status: COMPLETED | OUTPATIENT
Start: 2017-07-25 | End: 2017-07-25

## 2017-07-25 RX ORDER — HYDROMORPHONE HYDROCHLORIDE 2 MG/ML
1 INJECTION, SOLUTION INTRAMUSCULAR; INTRAVENOUS; SUBCUTANEOUS EVERY 4 HOURS PRN
Status: DISCONTINUED | OUTPATIENT
Start: 2017-07-25 | End: 2017-07-27

## 2017-07-25 RX ORDER — ACETAMINOPHEN 10 MG/ML
1000 INJECTION, SOLUTION INTRAVENOUS ONCE
Status: COMPLETED | OUTPATIENT
Start: 2017-07-25 | End: 2017-07-25

## 2017-07-25 RX ORDER — ACETAMINOPHEN 325 MG/1
650 TABLET ORAL EVERY 6 HOURS PRN
Status: DISCONTINUED | OUTPATIENT
Start: 2017-07-25 | End: 2017-07-29 | Stop reason: HOSPADM

## 2017-07-25 RX ORDER — SERTRALINE HYDROCHLORIDE 50 MG/1
50 TABLET, FILM COATED ORAL DAILY
Status: DISCONTINUED | OUTPATIENT
Start: 2017-07-26 | End: 2017-07-29 | Stop reason: HOSPADM

## 2017-07-25 RX ORDER — AMMONIA 15 % (W/V)
0.3 AMPUL (EA) INHALATION CONTINUOUS PRN
Status: DISCONTINUED | OUTPATIENT
Start: 2017-07-25 | End: 2017-07-29 | Stop reason: HOSPADM

## 2017-07-25 RX ORDER — ONDANSETRON 8 MG/1
8 TABLET, ORALLY DISINTEGRATING ORAL EVERY 8 HOURS PRN
Status: DISCONTINUED | OUTPATIENT
Start: 2017-07-25 | End: 2017-07-29 | Stop reason: HOSPADM

## 2017-07-25 RX ORDER — OXYCODONE AND ACETAMINOPHEN 5; 325 MG/1; MG/1
1 TABLET ORAL EVERY 4 HOURS PRN
Status: DISCONTINUED | OUTPATIENT
Start: 2017-07-25 | End: 2017-07-27

## 2017-07-25 RX ADMIN — Medication 41.65 MILLI-UNITS/MIN: at 09:07

## 2017-07-25 RX ADMIN — LIDOCAINE HYDROCHLORIDE 4 ML: 20 INJECTION, SOLUTION EPIDURAL; INFILTRATION; INTRACAUDAL; PERINEURAL at 08:07

## 2017-07-25 RX ADMIN — HYDROMORPHONE HYDROCHLORIDE 1 MG: 2 INJECTION, SOLUTION INTRAMUSCULAR; INTRAVENOUS; SUBCUTANEOUS at 09:07

## 2017-07-25 RX ADMIN — FENTANYL CITRATE 100 MCG: 50 INJECTION, SOLUTION INTRAMUSCULAR; INTRAVENOUS at 05:07

## 2017-07-25 RX ADMIN — ROPIVACAINE HYDROCHLORIDE: 2 INJECTION, SOLUTION EPIDURAL; INFILTRATION at 05:07

## 2017-07-25 RX ADMIN — MAGNESIUM SULFATE IN WATER 2 G/HR: 40 INJECTION, SOLUTION INTRAVENOUS at 10:07

## 2017-07-25 RX ADMIN — PROMETHAZINE HYDROCHLORIDE 12.5 MG: 25 INJECTION INTRAMUSCULAR; INTRAVENOUS at 01:07

## 2017-07-25 RX ADMIN — FENTANYL CITRATE 25 MCG: 50 INJECTION, SOLUTION INTRAMUSCULAR; INTRAVENOUS at 08:07

## 2017-07-25 RX ADMIN — MAGNESIUM SULFATE IN WATER 2 G: 40 INJECTION, SOLUTION INTRAVENOUS at 10:07

## 2017-07-25 RX ADMIN — ROPIVACAINE HYDROCHLORIDE 5 ML: 2 INJECTION, SOLUTION EPIDURAL; INFILTRATION at 11:07

## 2017-07-25 RX ADMIN — SODIUM CHLORIDE, SODIUM LACTATE, POTASSIUM CHLORIDE, AND CALCIUM CHLORIDE: .6; .31; .03; .02 INJECTION, SOLUTION INTRAVENOUS at 10:07

## 2017-07-25 RX ADMIN — BUTORPHANOL TARTRATE 2 MG: 1 INJECTION, SOLUTION INTRAMUSCULAR; INTRAVENOUS at 01:07

## 2017-07-25 RX ADMIN — FENTANYL CITRATE 50 MCG: 50 INJECTION, SOLUTION INTRAMUSCULAR; INTRAVENOUS at 08:07

## 2017-07-25 RX ADMIN — ACETAMINOPHEN 1000 MG: 10 INJECTION, SOLUTION INTRAVENOUS at 11:07

## 2017-07-25 RX ADMIN — ONDANSETRON 2 ML: 2 INJECTION, SOLUTION INTRAMUSCULAR; INTRAVENOUS at 08:07

## 2017-07-25 RX ADMIN — ROPIVACAINE HYDROCHLORIDE 5 ML: 5 INJECTION, SOLUTION EPIDURAL; INFILTRATION; PERINEURAL at 05:07

## 2017-07-25 RX ADMIN — LIDOCAINE HYDROCHLORIDE AND EPINEPHRINE 5 ML: 15; 5 INJECTION, SOLUTION EPIDURAL at 04:07

## 2017-07-25 RX ADMIN — ROPIVACAINE HYDROCHLORIDE 14 ML/HR: 2 INJECTION, SOLUTION EPIDURAL; INFILTRATION at 11:07

## 2017-07-25 RX ADMIN — PROMETHAZINE HYDROCHLORIDE 12.5 MG: 25 INJECTION INTRAMUSCULAR; INTRAVENOUS at 04:07

## 2017-07-25 RX ADMIN — FENTANYL CITRATE 100 MCG: 50 INJECTION INTRAMUSCULAR; INTRAVENOUS at 09:07

## 2017-07-25 RX ADMIN — DEXTROSE 2.5 MILLION UNITS: 50 INJECTION, SOLUTION INTRAVENOUS at 05:07

## 2017-07-25 RX ADMIN — SUCCINYLCHOLINE CHLORIDE 120 MG: 20 INJECTION, SOLUTION INTRAMUSCULAR; INTRAVENOUS at 08:07

## 2017-07-25 RX ADMIN — DEXTROSE 2.5 MILLION UNITS: 50 INJECTION, SOLUTION INTRAVENOUS at 09:07

## 2017-07-25 RX ADMIN — Medication 2 MILLI-UNITS/MIN: at 01:07

## 2017-07-25 RX ADMIN — LIDOCAINE HYDROCHLORIDE 6 ML: 20 INJECTION, SOLUTION EPIDURAL; INFILTRATION; INTRACAUDAL; PERINEURAL at 07:07

## 2017-07-25 RX ADMIN — DEXTROSE 2.5 MILLION UNITS: 50 INJECTION, SOLUTION INTRAVENOUS at 01:07

## 2017-07-25 RX ADMIN — BUTORPHANOL TARTRATE 2 MG: 1 INJECTION, SOLUTION INTRAMUSCULAR; INTRAVENOUS at 04:07

## 2017-07-25 RX ADMIN — PROPOFOL 150 MG: 10 INJECTION, EMULSION INTRAVENOUS at 08:07

## 2017-07-25 RX ADMIN — LIDOCAINE HYDROCHLORIDE 4 ML: 20 INJECTION, SOLUTION EPIDURAL; INFILTRATION; INTRACAUDAL; PERINEURAL at 07:07

## 2017-07-25 RX ADMIN — DEXTROSE 2.5 MILLION UNITS: 50 INJECTION, SOLUTION INTRAVENOUS at 03:07

## 2017-07-25 RX ADMIN — LIDOCAINE HYDROCHLORIDE 80 MG: 20 INJECTION, SOLUTION INTRAVENOUS at 08:07

## 2017-07-25 RX ADMIN — LIDOCAINE HYDROCHLORIDE AND EPINEPHRINE 5 ML: 15; 5 INJECTION, SOLUTION EPIDURAL at 11:07

## 2017-07-25 RX ADMIN — LIDOCAINE HYDROCHLORIDE 8 ML: 20 INJECTION, SOLUTION EPIDURAL; INFILTRATION; INTRACAUDAL; PERINEURAL at 08:07

## 2017-07-25 NOTE — PROGRESS NOTES
S: Pt on hands and knees, moaning in bed, drowsy, reports that she is hurting and extremely sleepy.  Reports still having headache, denies blurred vision.   O:  VS: 166/70   FHTs: Cat 2 130s minimal variability   Walford: every 2 minutes. Moderate to palpation   SVE: 280/-2  A:  IUP 37w1d   IOL for preE  P: Fentanyl now   Bolusing for epidural   Will place mayo bulb once comfortable

## 2017-07-25 NOTE — PROGRESS NOTES
[facility]  Obstetrics  Labor Progress Note    Patient Name: Juliet Jalloh  MRN: [id number]  Admission Date: 2017  Hospital Length of Stay: 1 days  Attending Physician: Gale Engle MD  Primary Care Provider: Primary Doctor No    Subjective:     Principal Problem:Pre-eclampsia in third trimester    Hospital Course:  17 at 0745hrs  PIH work up with serial BP and NST    1040hrs- Elevated creatinine with labile elevated BP and symptomatic  D/W Dr Rios- proceed with Cytotec IOL per protocol    0355hrs- Labile BP continue . 4xdoses Cytotec 25mcg given at 1040hrs, 1530hrs, 1930hrs,2330hrs. IV analgesia given    Interval History:  Juliet is a 22 y.o.  at 37w1d. She is being induced secondary to labile BP with symptoms. Needs much coaching and guidance     Objective:     Vital Signs (Most Recent):  Temp: 98.8 °F (37.1 °C) (17 0430)  Pulse: 69 (17 0124)  Resp: 20 (17 1902)  BP: (!) 159/93 (17 0124) Vital Signs (24h Range):  Temp:  [96.8 °F (36 °C)-98.8 °F (37.1 °C)] 98.8 °F (37.1 °C)  Pulse:  [65-96] 69  Resp:  [20] 20  BP: (121-164)/() 159/93     Weight: 120.4 kg (265 lb 6.9 oz)  Body mass index is 42.84 kg/m².    FHT: 135bpm Cat 1 (reassuring)  TOCO:  Q 2-3 minutes    Cervical Exam:    Per RN  Dilation:  2  Effacement:  50%  Station: -2  Presentation: Vertex     Significant Labs:  Lab Results   Component Value Date    GROUPTRH O POS 2016    HEPBSAG Negative 2016    STREPBCULT STREPTOCOCCUS AGALACTIAE (GROUP B) 2017       I have personallly reviewed all pertinent lab results from the last 24 hours.    Physical Exam    Assessment/Plan:     22 y.o. female  at 37w1d for:    * Pre-eclampsia in third trimester    Admit per protocol  Start Cytotec IOL per protocol  Serial BP        GBS (group B Streptococcus carrier), +RV culture, currently pregnant    Start PCN prophylaxis per protocol with start of regular contractions and/or  SROM        Elevated BP without diagnosis of hypertension    Labile BP  PIH work up with NST and serial BP    See problem -Pre-eclampsia- Cytotec IOL per protocol        Herpes simplex virus (HSV) infection    No outbreak- taking suppression RX past month              Wendy Payan CNM  Obstetrics  [facility]

## 2017-07-25 NOTE — PROGRESS NOTES
Mcbride bulb inserted. 80mL of sterile water inserted into each port. Pt tolerated well. Pitocin to be started.

## 2017-07-25 NOTE — PROGRESS NOTES
S: Comfortable after epidural. Consented to mayo bulb and pitocin.  O:  VSS   FHTs: Cat 2 135 min variabililty, no accels or decels   Crumpler: irregular contractions   SVE: 3-4/80/-2 mayo bulb placed with 80 ml in each balloon  A:  IUP @ 37w1d   IOL for preE   Latent labor  P: Start pitocin per protocol.

## 2017-07-25 NOTE — ANESTHESIA PREPROCEDURE EVALUATION
07/25/2017  Juliet Jalloh is a 22 y.o., female.    Pre-op Assessment    I have reviewed the Patient Summary Reports.     I have reviewed the Nursing Notes.   I have reviewed the Medications.     Review of Systems  Anesthesia Hx:  Neg history of prior surgery. Denies Family Hx of Anesthesia complications.   Denies Personal Hx of Anesthesia complications.   Social:  Non-Smoker, No Alcohol Use    Hematology/Oncology:  Hematology Normal   Oncology Normal     EENT/Dental:EENT/Dental Normal   Cardiovascular:   Hypertension Gest HTN with PreEclampsia   Pulmonary:  Pulmonary Normal    Hepatic/GI:  Hepatic/GI Normal    Musculoskeletal:  Musculoskeletal Normal    OB/GYN/PEDS:  SVH  Induction for Pre-E   Neurological:  Neurology Normal    Endocrine:  Endocrine Normal    Psych:   depression          Physical Exam  General:  Obesity    Airway/Jaw/Neck:  Airway Findings: Mouth Opening: Normal Tongue: Normal  General Airway Assessment: Adult  Mallampati: III  Improves to III with phonation.  TM Distance: Normal, at least 6 cm       Chest/Lungs:  Chest/Lungs Findings: Normal Respiratory Rate     Heart/Vascular:  Heart Findings: Rate: Normal        Mental Status:  Mental Status Findings:  Cooperative         Anesthesia Plan  Type of Anesthesia, risks & benefits discussed:  Anesthesia Type:  epidural  Patient's Preference:   Intra-op Monitoring Plan: standard ASA monitors  Intra-op Monitoring Plan Comments:   Post Op Pain Control Plan:   Post Op Pain Control Plan Comments:   Induction:    Beta Blocker:  Patient is not currently on a Beta-Blocker (No further documentation required).       Informed Consent: Patient understands risks and agrees with Anesthesia plan.  Questions answered. Anesthesia consent signed with patient.  ASA Score: 2     Day of Surgery Review of History & Physical: I have interviewed and examined  the patient. I have reviewed the patient's H&P dated:

## 2017-07-25 NOTE — SUBJECTIVE & OBJECTIVE
Interval History:  Juliet is a 22 y.o.  at 37w1d. She is being induced secondary to labile BP with symptoms. Needs much coaching and guidance     Objective:     Vital Signs (Most Recent):  Temp: 98.8 °F (37.1 °C) (17 0430)  Pulse: 69 (17 0124)  Resp: 20 (17 1902)  BP: (!) 159/93 (17 0124) Vital Signs (24h Range):  Temp:  [96.8 °F (36 °C)-98.8 °F (37.1 °C)] 98.8 °F (37.1 °C)  Pulse:  [65-96] 69  Resp:  [20] 20  BP: (121-164)/() 159/93     Weight: 120.4 kg (265 lb 6.9 oz)  Body mass index is 42.84 kg/m².    FHT: 135bpm Cat 1 (reassuring)  TOCO:  Q 2-3 minutes    Cervical Exam:    Per RN  Dilation:  2  Effacement:  50%  Station: -2  Presentation: Vertex     Significant Labs:  Lab Results   Component Value Date    GROUPTRH O POS 2016    HEPBSAG Negative 2016    STREPBCULT STREPTOCOCCUS AGALACTIAE (GROUP B) 2017       I have personallly reviewed all pertinent lab results from the last 24 hours.    Physical Exam

## 2017-07-25 NOTE — ANESTHESIA PROCEDURE NOTES
Epidural    Patient location during procedure: OB   Reason for block: primary anesthetic   Diagnosis: IUp with labor pain   Start time: 7/25/2017 11:02 AM  Timeout: 7/25/2017 11:00 AM  Staffing  Anesthesiologist: YUNIEL MENENDEZ  Resident/CRNA: DANNY MCFARLAND  Performed: anesthesiologist   Preanesthetic Checklist  Completed: patient identified, pre-op evaluation, timeout performed, IV checked, risks and benefits discussed, monitors and equipment checked, anesthesia consent given, hand hygiene performed and patient being monitored  Preparation  Patient position: sitting  Prep: Betadine  Patient monitoring: Pulse Ox and Blood Pressure  Epidural  Skin Anesthetic: lidocaine 1%  Skin Wheal: 3 mL  Administration type: continuous  Approach: midline  Interspace: L3-4  Injection technique: FALLON saline and FALLON air  Needle and Epidural Catheter  Needle type: Tuohy   Needle gauge: 18  Needle length: 3.5 inches  Needle insertion depth: 9 cm  Catheter type: multi-orifice  Catheter size: 20 G  Catheter at skin depth: 13 cm  Additional Documentation: incremental injection, negative aspiration for heme and CSF, no signs/symptoms of IV or SA injection, no significant pain on injection, no significant complaints from patient and no paresthesia on injection  Needle localization: anatomical landmarks  Medications:  Bolus administered: 10 mL of 0.2% ropivacaine  Epinephrine added: none  Assessment  Upper dermatomal levels - Left: T8  Right: T8   Dermatomal levels determined by pinch or prick  Ease of block: easy  Patient's tolerance of the procedure: comfortable throughout block and no complaints

## 2017-07-25 NOTE — PROGRESS NOTES
Held dose of cytotec per ANGELIC Payan CNM. Will continue to monitor frequency of contractions and patient pain level. Once contractions space out, will place 5th dose of cytotec as appropriate.

## 2017-07-26 PROCEDURE — 36000685 HC CESAREAN SECTION LEVEL I

## 2017-07-26 PROCEDURE — 25000003 PHARM REV CODE 250: Performed by: OBSTETRICS & GYNECOLOGY

## 2017-07-26 PROCEDURE — 25000003 PHARM REV CODE 250: Performed by: ADVANCED PRACTICE MIDWIFE

## 2017-07-26 PROCEDURE — 99231 SBSQ HOSP IP/OBS SF/LOW 25: CPT | Mod: ,,, | Performed by: OBSTETRICS & GYNECOLOGY

## 2017-07-26 PROCEDURE — 72100003 HC LABOR CARE, EA. ADDL. 8 HRS

## 2017-07-26 PROCEDURE — 51702 INSERT TEMP BLADDER CATH: CPT

## 2017-07-26 PROCEDURE — 11000001 HC ACUTE MED/SURG PRIVATE ROOM

## 2017-07-26 PROCEDURE — 63600175 PHARM REV CODE 636 W HCPCS: Performed by: OBSTETRICS & GYNECOLOGY

## 2017-07-26 RX ORDER — CALCIUM CARBONATE 200(500)MG
500 TABLET,CHEWABLE ORAL DAILY PRN
Status: DISCONTINUED | OUTPATIENT
Start: 2017-07-26 | End: 2017-07-29 | Stop reason: HOSPADM

## 2017-07-26 RX ORDER — MAG HYDROX/ALUMINUM HYD/SIMETH 200-200-20
30 SUSPENSION, ORAL (FINAL DOSE FORM) ORAL
Status: DISCONTINUED | OUTPATIENT
Start: 2017-07-26 | End: 2017-07-29 | Stop reason: HOSPADM

## 2017-07-26 RX ADMIN — ONDANSETRON 8 MG: 8 TABLET, ORALLY DISINTEGRATING ORAL at 09:07

## 2017-07-26 RX ADMIN — SODIUM CHLORIDE, SODIUM LACTATE, POTASSIUM CHLORIDE, AND CALCIUM CHLORIDE 1000 ML: .6; .31; .03; .02 INJECTION, SOLUTION INTRAVENOUS at 11:07

## 2017-07-26 RX ADMIN — MAGNESIUM SULFATE IN WATER 2 G/HR: 40 INJECTION, SOLUTION INTRAVENOUS at 11:07

## 2017-07-26 RX ADMIN — HYDROMORPHONE HYDROCHLORIDE 1 MG: 2 INJECTION, SOLUTION INTRAMUSCULAR; INTRAVENOUS; SUBCUTANEOUS at 05:07

## 2017-07-26 RX ADMIN — Medication 1 EACH: at 10:07

## 2017-07-26 RX ADMIN — OXYCODONE HYDROCHLORIDE AND ACETAMINOPHEN 1 TABLET: 10; 325 TABLET ORAL at 05:07

## 2017-07-26 RX ADMIN — OXYCODONE HYDROCHLORIDE AND ACETAMINOPHEN 1 TABLET: 10; 325 TABLET ORAL at 09:07

## 2017-07-26 RX ADMIN — ALUMINUM HYDROXIDE, MAGNESIUM HYDROXIDE, AND SIMETHICONE 30 ML: 200; 200; 20 SUSPENSION ORAL at 05:07

## 2017-07-26 RX ADMIN — HYDROMORPHONE HYDROCHLORIDE 1 MG: 2 INJECTION, SOLUTION INTRAMUSCULAR; INTRAVENOUS; SUBCUTANEOUS at 11:07

## 2017-07-26 RX ADMIN — ALUMINUM HYDROXIDE, MAGNESIUM HYDROXIDE, AND SIMETHICONE 30 ML: 200; 200; 20 SUSPENSION ORAL at 09:07

## 2017-07-26 RX ADMIN — OXYCODONE HYDROCHLORIDE AND ACETAMINOPHEN 1 TABLET: 10; 325 TABLET ORAL at 02:07

## 2017-07-26 RX ADMIN — CALCIUM CARBONATE (ANTACID) CHEW TAB 500 MG 500 MG: 500 CHEW TAB at 01:07

## 2017-07-26 RX ADMIN — OXYCODONE HYDROCHLORIDE AND ACETAMINOPHEN 1 TABLET: 10; 325 TABLET ORAL at 10:07

## 2017-07-26 RX ADMIN — OXYCODONE HYDROCHLORIDE AND ACETAMINOPHEN 1 TABLET: 10; 325 TABLET ORAL at 03:07

## 2017-07-26 RX ADMIN — SODIUM CHLORIDE, SODIUM LACTATE, POTASSIUM CHLORIDE, AND CALCIUM CHLORIDE 1000 ML: .6; .31; .03; .02 INJECTION, SOLUTION INTRAVENOUS at 08:07

## 2017-07-26 RX ADMIN — DOCUSATE SODIUM 100 MG: 100 CAPSULE, LIQUID FILLED ORAL at 09:07

## 2017-07-26 NOTE — LACTATION NOTE
"Lactation Rounds:    Mother asked what her feeding plan was for her baby, she states she "wants to do bottle". Asked mother if she had intentions to put infant to the breast, mother states she was going to but she is in too much pain and just wants to formula feed at this time.  Education provided on impact of milk supply. Mother very dismissive and states "I already know". Mother encouraged to call lactation as needed for assistance or change in feeding plan. Mother verbalized understanding.   "

## 2017-07-26 NOTE — SUBJECTIVE & OBJECTIVE
Hospital course: Pt was admitted for IOL at 37 w0d secondary to Pre-eclampsia.  Pt was started on Cytotec induction.  Labor progressed but eventually stalled.  PLTCS was performed without complications on 7/25/17 and pt placed on MgSO4.    Interval History:   POD #1/2    She is doing well this morning. She is tolerating a regular diet without nausea or vomiting. She is not voiding spontaneously (mayo still in place secondary to magnesium). She is not ambulating. She has not passed flatus, and has not a BM. Vaginal bleeding is mild. She denies fever or chills. Abdominal pain is mild and controlled with oral medications. She is not breastfeeding.  She denies pre E symtpoms.  Objective:     Vital Signs (Most Recent):  Temp: 98.4 °F (36.9 °C) (07/26/17 0630)  Pulse: 97 (07/26/17 0700)  Resp: 18 (07/26/17 0630)  BP: 134/84 (07/26/17 0700)  SpO2: 95 % (07/26/17 0700) Vital Signs (24h Range):  Temp:  [98 °F (36.7 °C)-99.7 °F (37.6 °C)] 98.4 °F (36.9 °C)  Pulse:  [] 97  Resp:  [18-24] 18  SpO2:  [94 %-100 %] 95 %  BP: (125-177)/() 134/84     Weight: 120.4 kg (265 lb 6.9 oz)  Body mass index is 42.84 kg/m².      Intake/Output Summary (Last 24 hours) at 07/26/17 0805  Last data filed at 07/26/17 0720   Gross per 24 hour   Intake             1250 ml   Output             2970 ml   Net            -1720 ml       Significant Labs:  Lab Results   Component Value Date    GROUPTRH O POS 07/24/2017    HEPBSAG Negative 12/16/2016    STREPBCULT STREPTOCOCCUS AGALACTIAE (GROUP B) 07/18/2017       Recent Labs  Lab 07/24/17  0830   HGB 9.4*   HCT 29.2*       I have personallly reviewed all pertinent lab results from the last 24 hours.    Physical Exam:   Constitutional: She is oriented to person, place, and time. She appears well-developed.    HENT:   Head: Normocephalic.    Eyes: Pupils are equal, round, and reactive to light.    Neck: Normal range of motion.    Cardiovascular: Normal rate and regular rhythm.      Pulmonary/Chest: Effort normal and breath sounds normal.        Abdominal: Soft. Bowel sounds are normal. She exhibits no abdominal incision (bandage intact).     Genitourinary:   Genitourinary Comments: Ut --firm, non tender           Musculoskeletal: Normal range of motion. She exhibits no edema.       Neurological: She is alert and oriented to person, place, and time. She has normal reflexes.    Skin: Skin is warm and dry.    Psychiatric: She has a normal mood and affect. Her behavior is normal. Judgment and thought content normal.

## 2017-07-26 NOTE — PROGRESS NOTES
Called secondary to worsening cervix edema and failure to progress.  Pt was counseled on options and recommendation made to proceed with C/S.  Surgery details, indications, risks, and benefits were discussed.  Pt voiced understanding and pt desires to proceed with C/S.  Hospital consents were reviewed with pt and signed.  OR team aware.

## 2017-07-26 NOTE — ASSESSMENT & PLAN NOTE
Pod #1/2  S/p c/section for failed induction (max dilation of 5 cm) for preE  Advance diet, ambulate once magnesium discontinued  Anticipate discharge in 2-3 days

## 2017-07-26 NOTE — OP NOTE
OPERATIVE NOTE    DATE:  2017    PRE-PROCEDURE COUNSELING:  Patient counseled on the risks, benefits, and alternatives to procedure.  Please see preoperative consents.   SCDs were applied and working prior to anesthesia induction.    PRE-OPERATIVE DIAGNOSIS:    1.  IUP @ 37w1d  2.  Failure to progress (failed induction)  3.  Pre-eclampsia  4.  GBS positive  5.  Depression  6.  Obesity    POST-OPERATIVE DIAGNOSIS: same    PROCEDURE: Primary Low Transverse  Section    SURGEON: Guillaume Babb MD    ASSISTANT: Brandy Esteban [facility]    ANESTHESIA: Epidural Anesthesia converted to GETA    FINDINGS:  Single live male infant in cephalic presentation.  APGAR 7/9 Weight 6 lb 4 oz.  Normal uterus, tubes, ovaries.    SPECIMEN:  None    EBL:  500 mL    COMPLICATIONS:  None    IMPLANTS:  None    PROCEDURE IN DETAIL:   The patient was seen in the Holding Room. The risks, benefits and alternatives were discussed. The patient agrees with the proposed plan, giving informed consent.  The patient was taken to Operating Room, identified as [facility] and the procedure verified as  Delivery. A Time Out was held and the above information confirmed.    Epidural anesthesia was dosed, but adequate level was not able to be attained.  As a result, a decision was made to proceed with General anesthesia.  Preoperative antibiotics were administered.  The patient was draped and prepped in the usual sterile fashion.  General anesthesia was obtained.  A Pfannenstiel skin incision was made and carried down through the subcutaneous tissue to the fascia. Fascial incision was then made and extended transversely. The fascia was  from the underlying rectus muscles superiorly and inferiorly. The peritoneum was identified, tented up, and entered sharply.  This incision was then extended superiorly and inferiorly with good visualization of the underlying bowel and bladder.   The utero-vesical peritoneal reflection  was incised transversely and the bladder flap was bluntly dissected from the lower uterine segment.  A low transverse uterine incision was made in the midline and extended laterally.  There was clear amniotic fluid noted. The male infant was delivered from vertex presentation without difficulty and with Apgar scores of 7/9.  The umbilical cord was doubly clamped and cut.  Cord blood was obtained. The placenta was removed intact and appeared normal.  The uterine incision was then approximated in a running locked fashion with 0-Chromic.   The abdomen and pelvis were irrigated and hemostasis noted.  The rectus muscles were then loosely approximated at the midline with 2-0 Chromic.  The fascia was then approximated in a running fashion with 0-Vicryl.  Wound was irrigated and hemostasis noted.  The skin was approximated with 4-0 vicryl in a running subcuticular fashion and an abdominal silver dressing placed.  Instrument, sponge, and needle counts were correct prior the abdominal closure and at the conclusion of the case.     DISPOSITION: to recovery PP room           CONDITION: stable

## 2017-07-26 NOTE — PROGRESS NOTES
RN Spoke with Dr. Galdamez to clarify Mag sulfate D/C order. Mag sulfate D/C. Dr. Galdamez reviewed pt history and reordered magnesium sulfate restarted. See flowsheet/MAR.

## 2017-07-26 NOTE — PROGRESS NOTES
"Pt mother came out to nurse' station, stating "my daughter called out three times for help with the bedpan and now she has peed all over the bed." Unit secretary x2 and Nurses x3 at the desk never received a call bell alert. RN x2 went to pt room and changed pads and sheets on pt bed. Pericare performed per nurses. Pt declines to assist in moving self in the bed stating "i can't. i'm in pain." Non-verbal pain indicators absent at this time. Encouraged pt to use call light for assistance.  "

## 2017-07-26 NOTE — PROGRESS NOTES
S: Pt spontaneously pushing, reports pain and pressure.  O:  /93   FHTs: 135, Cat 1   [place]: 2-3 minutes   SVE: 5/70/0, cervix very edematous.  A:  IUP @ 37w1d   IOL for preE   Failure to progress  P: Explained options to patient: redo epidural and continue to try for a vaginal birth or proceed with C/S. Pt desires C/S, does not want to redo epidural. Family agrees with plan for .

## 2017-07-26 NOTE — ANESTHESIA POSTPROCEDURE EVALUATION
Anesthesia Post Evaluation    Patient: Juliet Jalloh    Procedure(s) Performed: Procedure(s) (LRB):  DELIVERY- SECTION (N/A)    Final Anesthesia Type: general  Patient location during evaluation: labor & delivery  Patient participation: Yes- Able to Participate  Level of consciousness: awake and alert and oriented  Post-procedure vital signs: reviewed and stable  Pain management: adequate  Airway patency: patent  PONV status at discharge: No PONV  Anesthetic complications: no      Cardiovascular status: blood pressure returned to baseline  Respiratory status: unassisted, spontaneous ventilation and room air  Hydration status: euvolemic  Follow-up not needed.        Visit Vitals  BP (!) 164/93   Pulse 89   Temp 37.2 °C (99 °F) (Temporal)   Resp 20   Wt 120.4 kg (265 lb 6.9 oz)   LMP 2016   SpO2 97%   Breastfeeding? No   BMI 42.84 kg/m²       Pain/Kath Score: Pain Assessment Performed: Yes (2017 10:15 PM)  Presence of Pain: complains of pain/discomfort (2017 10:15 PM)  Pain Rating Prior to Med Admin: 10 (2017  9:56 AM)  Pain Rating Post Med Admin: 10 (2017  4:20 AM)  Kath Score: 10 (2017 10:15 PM)

## 2017-07-26 NOTE — ANESTHESIA RELEASE NOTE
Anesthesia Release from PACU Note    Patient: Juliet Jalloh    Procedure(s) Performed: Procedure(s) (LRB):  DELIVERY- SECTION (N/A)    Anesthesia type: general    Post pain: Adequate analgesia    Post assessment: no apparent anesthetic complications and tolerated procedure well    Last Vitals:   Visit Vitals  BP (!) 164/93   Pulse 89   Temp 37.2 °C (99 °F) (Temporal)   Resp 20   Wt 120.4 kg (265 lb 6.9 oz)   LMP 2016   SpO2 97%   Breastfeeding? No   BMI 42.84 kg/m²       Post vital signs: stable    Level of consciousness: awake, alert  and oriented    Nausea/Vomiting: no nausea/no vomiting    Complications: none    Airway Patency: patent    Respiratory: unassisted, spontaneous ventilation, room air    Cardiovascular: stable and blood pressure at baseline    Hydration: euvolemic

## 2017-07-26 NOTE — PLAN OF CARE
Problem: Patient Care Overview  Goal: Plan of Care Review  Outcome: Ongoing (interventions implemented as appropriate)  Induction for PIH. Denies symptoms. Multiple doses of PCN. Epidural not working well. Pitocin turned off at this time. Family at bedside. Will continue to monitor.

## 2017-07-26 NOTE — PHYSICIAN QUERY
PT Name: Juliet Jalloh  MR #: [id number]     Physician Query Form - Documentation Clarification      CDS/: Ana Santos RN-BSN     Contact information:[phone]    This form is a permanent document in the medical record.     Query Date: July 26, 2017    By submitting this query, we are merely seeking further clarification of documentation. Please utilize your independent clinical judgment when addressing the question(s) below.    The Medical record reflects the following:    Supporting Clinical Findings Location in Medical Record   Pre-eclampsia in third trimester  Admit per protocol  Start Cytotec IOL per protocol  Serial BP    BP at home 150/111, with H/A and nausea  Started on Procardia 60mg QD last week    Reports still having headache, denies blurred vision.     Elevated creatinine with labile elevated BP and symptomatic    magnesium sulfate in water 40 gram/1,000 mL (4 %) infusion    magnesium sulfate 2g in water 50mL IVPB     Fo=813/100, 148/111, 173/99, 173/110 OB Progress note 7/24@1109am          H&P 7/24          OB Progress note 7/25@948am      OB Progress note 7/24@1109am      MAR 7/25            Flowsheet 7/24-7/25                                                                                      Doctor, Please specify diagnosis or diagnoses associated with above clinical findings.    Provider Use Only    [  ] Moderate Pre-eclampsia  [ X ] Severe Pre-eclampsia  [  ] Other ( Please specify)_____________________________________________                                                                                                               [  ] Clinically undetermined

## 2017-07-26 NOTE — PLAN OF CARE
Problem: Patient Care Overview  Goal: Plan of Care Review  Outcome: Ongoing (interventions implemented as appropriate)  Primary C/S @ 2026 on 7/25/2017 for failure to progress after failed induction for Pre-E. Postpartum Magnesium Sulfate initiated after delivery--infusing now at 2g/hr per orders. Bleeding initially heavy--has now resolved to light to moderate. Use of Dilaudid IV, Tylenol IV, and Percocet-10 rendered minimal pain relief. Mcbride in place, SCDs on, continuous pulse ox. Abdominal dressing clean, dry, intact. Requiring motivation to care for, bond, and interact with infant.

## 2017-07-26 NOTE — PROGRESS NOTES
Pt states she had intended to breastfeed but declines breastfeeding or pumping assistance at this time. Elects to formula feed.

## 2017-07-26 NOTE — PROGRESS NOTES
S:  RN reported that mayo bulb fell out. Pt agreed to AROM, but while discussing AROM risks and benefits pt had SROM,  O:  /90s   FHTs: 140 Cat 1   [place]: 2-4 minutes   SVE: 8/90/-1 per my exam, clear fluid noted, IUPC placed  A:  IUP @ 37w1d   IOL for preE, good progress  P: Continue pitocin   Anticipate vaginal birth.

## 2017-07-26 NOTE — PLAN OF CARE
Pt resting on bed s/p C Section performed by Dr. Chapman, initially started as spinal and converted to general. Upon arrival into room, mom's fundus being messaged by L&D nurse with baby nurse assisting. Baby under warming light. Incision site with dsg dry and intact. Mcbride draining clint colored urine. Bilat SCD's in place. VSS. Will cont to monitor. See flow sheet for detailed assessment.

## 2017-07-26 NOTE — L&D DELIVERY NOTE
Delivery Information for  Armando Jalloh    Birth information:  YOB: 2018   Time of birth: 8:26 PM   Sex: male   Head Delivery Date/Time: 2017  8:25 PM   Delivery type: , Low Transverse   Gestational Age: 37w1d    Delivery Providers    Delivering clinician:  Guillaume Babb MD   Other personnel:   Provider Role   Silverio Sol RN Registered Nurse   Ernestina Parker RN Registered Nurse   Brandy Esteban First Assist   Trice Bennett Surgical Tech   Geovanni Figueroa, CRNA Nurse Anesthetist                    Assessment    Living status:  Living  Apgars:     1 Minute:   5 Minute:   10 Minute:   15 Minute:   20 Minute:     Skin Color:   1  1       Heart Rate:   2  2       Reflex Irritability:   2  2       Muscle Tone:   2  2       Respiratory Effort:   2  2       Total:   9  9                      Assisted Delivery Details:    Forceps attempted?:  No  Vacuum extractor attempted?:  No         Shoulder Dystocia    Shoulder dystocia present?:  No           Presentation and Position    Presentation:   Vertex   Position:                   Interventions/Resuscitation    Method:  Bulb Suctioning, Tactile Stimulation       Cord    Vessels:  3 vessels  Complications:  None  Delayed Cord Clamping?:  No  Cord Clamped Date/Time:  2017  8:26 PM  Cord Blood Disposition:  Lab  Gases Sent?:  No  Stem Cell Collection (by MD):  No       Placenta    Date and time:  2017  8:28 PM  Removal:  Spontaneous  Appearance:  Intact  Placenta disposition:  discarded           Labor Events:       labor: No     Labor Onset Date/Time:         Dilation Complete Date/Time:         Start Pushing Date/Time:       Rupture Date/Time:              Rupture type:           Fluid Amount:        Fluid Color:        Fluid Odor:        Membrane Status (PeriCalm): SRM (Spontaneous Rupture)      Rupture Date/Time (PeriCalm): 2017 15:25:00      Fluid Amount (PeriCalm): Moderate      Fluid Color (PeriCalm):          steroids: None     Antibiotics given for GBS: Yes     Induction: oxytocin;balloon dilation (Mcbride)     Indications for induction:  Hypertension     Augmentation: oxytocin     Indications for augmentation: Ineffective Contraction Pattern     Labor complications: Failure to Progress in First Stage     Additional complications:          Cervical ripening:                     Delivery:      Episiotomy: None     Indication for Episiotomy:       Perineal Lacerations: None Repaired:      Periurethral Laceration: none Repaired:     Labial Laceration: none Repaired:     Sulcus Laceration: none Repaired:     Vaginal Laceration: No Repaired:     Cervical Laceration: No Repaired:     Repair suture:       Repair # of packets:       Vaginal delivery QBL (mL): 0      QBL (mL): 500     Combined Blood Loss (mL): 500     Vaginal Sweep Performed:       Surgicount Correct:         Other providers:       Anesthesia    Method:  General          Details (if applicable):  Trial of Labor Yes    Categorization: Primary    Priority: Routine   Indications for : Failure to Progress   Incision Type: Low Transverse     Additional  information:  Forceps:    Vacuum:    Breech:    Observed anomalies    Other (Comments):

## 2017-07-26 NOTE — PLAN OF CARE
Hand off given to primary nurse Silverio. Surgical site remains intact. VSS. Diastolic BP now improving. Silverio states since epidural still on board, pt will receive iv pain med once time elapses to administer safely and states she will administer then.

## 2017-07-26 NOTE — PROGRESS NOTES
RN attempted to give pt AM scheduled meds and PRN pain med. Pt uncooperative in accepting medication from nurse, refusing to extend hand and dropping her medication on the bed. Medication was recovered by RN and accepted by pt after re-education on medications and risks/benefits.

## 2017-07-26 NOTE — PROGRESS NOTES
[facility] -   Obstetrics  Postpartum Progress Note    Patient Name: Juliet Jalloh  MRN: [id number]  Admission Date: 2017  Hospital Length of Stay: 2 days  Attending Physician: Gale Engle MD  Primary Care Provider: Primary Doctor No    Subjective:     Principal Problem:Status post primary low transverse  section    Hospital course: Pt was admitted for IOL at 37 w0d secondary to Pre-eclampsia.  Pt was started on Cytotec induction.  Labor progressed but eventually stalled.  PLTCS was performed without complications on 17 and pt placed on MgSO4.    Interval History:   POD #1/2    She is doing well this morning. She is tolerating a regular diet without nausea or vomiting. She is not voiding spontaneously (mayo still in place secondary to magnesium). She is not ambulating. She has not passed flatus, and has not a BM. Vaginal bleeding is mild. She denies fever or chills. Abdominal pain is mild and controlled with oral medications. She is not breastfeeding.  She denies pre E symtpoms.  Objective:     Vital Signs (Most Recent):  Temp: 98.4 °F (36.9 °C) (17 0630)  Pulse: 97 (17 0700)  Resp: 18 (17 0630)  BP: 134/84 (17 0700)  SpO2: 95 % (17 0700) Vital Signs (24h Range):  Temp:  [98 °F (36.7 °C)-99.7 °F (37.6 °C)] 98.4 °F (36.9 °C)  Pulse:  [] 97  Resp:  [18-24] 18  SpO2:  [94 %-100 %] 95 %  BP: (125-177)/() 134/84     Weight: 120.4 kg (265 lb 6.9 oz)  Body mass index is 42.84 kg/m².      Intake/Output Summary (Last 24 hours) at 17 0805  Last data filed at 17 0720   Gross per 24 hour   Intake             1250 ml   Output             2970 ml   Net            -1720 ml       Significant Labs:  Lab Results   Component Value Date    GROUPTRH O POS 2017    HEPBSAG Negative 2016    STREPBCULT STREPTOCOCCUS AGALACTIAE (GROUP B) 2017       Recent Labs  Lab 17  0830   HGB 9.4*   HCT 29.2*       I have personallly  reviewed all pertinent lab results from the last 24 hours.    Physical Exam:   Constitutional: She is oriented to person, place, and time. She appears well-developed.    HENT:   Head: Normocephalic.    Eyes: Pupils are equal, round, and reactive to light.    Neck: Normal range of motion.    Cardiovascular: Normal rate and regular rhythm.     Pulmonary/Chest: Effort normal and breath sounds normal.        Abdominal: Soft. Bowel sounds are normal. She exhibits no abdominal incision (bandage intact).     Genitourinary:   Genitourinary Comments: Ut --firm, non tender           Musculoskeletal: Normal range of motion. She exhibits no edema.       Neurological: She is alert and oriented to person, place, and time. She has normal reflexes.    Skin: Skin is warm and dry.    Psychiatric: She has a normal mood and affect. Her behavior is normal. Judgment and thought content normal.       Assessment/Plan:     22 y.o. female  for:    Pre-eclampsia in third trimester    Continue Magnesium sulfate for total of 24 hrs  Blood pressures stable off antihypertensives        GBS (group B Streptococcus carrier), +RV culture, currently pregnant    Start PCN prophylaxis per protocol with start of regular contractions and/or SROM        Elevated BP without diagnosis of hypertension    Labile BP  PIH work up with NST and serial BP    See problem -Pre-eclampsia- Cytotec IOL per protocol        Herpes simplex virus (HSV) infection    No outbreak- taking suppression RX past month        * Status post primary low transverse  section    Pod #1/2  S/p c/section for failed induction (max dilation of 5 cm) for preE  Advance diet, ambulate once magnesium discontinued  Anticipate discharge in 2-3 days            Disposition: As patient meets milestones, will plan to discharge .    Poonam Galdamez MD  Obstetrics  [facility]

## 2017-07-26 NOTE — PHYSICIAN QUERY
PT Name: Juliet Jalloh  MR #: [id number]     Physician Query Form - Documentation Clarification      CDS/: Ana Santos RN-BSN      Contact information:[phone]    This form is a permanent document in the medical record.     Query Date: 2017    By submitting this query, we are merely seeking further clarification of documentation. Please utilize your independent clinical judgment when addressing the question(s) below.    The Medical record reflects the following:    Supporting Clinical Findings Location in Medical Record   This pregnancy has been complicated by obesity, HSV- no outbreak at present    PTA Medications: valacyclovir (VALTREX) 500 MG tablet  Take 1 tablet (500 mg total) by mouth once daily.    Herpes simplex virus (HSV) infection  No outbreak- taking suppression RX past month    PROCEDURE: Primary Low Transverse  Section H&P                         OP note                                                                                       Doctor, Please specify site of previous Herpes simplex virus infection    Provider Use Only    [ X ] Genital, unspecified  [  ] Oral  [  ] Vagina  [  ] Vulva  [  ] Cervix  [  ] Anus  [  ] Other ( Please specify)__________________________________________                                                                                                                 [  ] Clinically undetermined

## 2017-07-26 NOTE — TRANSFER OF CARE
Anesthesia Transfer of Care Note    Patient: Juliet Jalloh    Procedure(s) Performed: Procedure(s) (LRB):  DELIVERY- SECTION (N/A)    Patient location: Labor and Delivery    Anesthesia Type: general    Transport from OR: Transported from OR on room air with adequate spontaneous ventilation    Post pain: adequate analgesia    Post assessment: no apparent anesthetic complications    Post vital signs: stable    Level of consciousness: awake, alert and oriented    Nausea/Vomiting: no nausea/vomiting    Complications: none    Transfer of care protocol was followed      Last vitals:   Visit Vitals  BP (!) 164/93   Pulse 89   Temp 37.2 °C (99 °F) (Temporal)   Resp 20   Wt 120.4 kg (265 lb 6.9 oz)   LMP 2016   SpO2 97%   Breastfeeding? No   BMI 42.84 kg/m²

## 2017-07-27 LAB — POCT GLUCOSE: 95 MG/DL (ref 70–110)

## 2017-07-27 PROCEDURE — 25000003 PHARM REV CODE 250: Performed by: OBSTETRICS & GYNECOLOGY

## 2017-07-27 PROCEDURE — 11000001 HC ACUTE MED/SURG PRIVATE ROOM

## 2017-07-27 PROCEDURE — 99232 SBSQ HOSP IP/OBS MODERATE 35: CPT | Mod: ,,, | Performed by: OBSTETRICS & GYNECOLOGY

## 2017-07-27 PROCEDURE — 25000003 PHARM REV CODE 250: Performed by: ADVANCED PRACTICE MIDWIFE

## 2017-07-27 RX ORDER — HYDROCODONE BITARTRATE AND ACETAMINOPHEN 10; 325 MG/1; MG/1
1 TABLET ORAL EVERY 6 HOURS PRN
Status: DISCONTINUED | OUTPATIENT
Start: 2017-07-27 | End: 2017-07-28

## 2017-07-27 RX ORDER — HYDROCODONE BITARTRATE AND ACETAMINOPHEN 5; 325 MG/1; MG/1
1 TABLET ORAL EVERY 6 HOURS PRN
Status: DISCONTINUED | OUTPATIENT
Start: 2017-07-27 | End: 2017-07-27

## 2017-07-27 RX ORDER — HYDROCODONE BITARTRATE AND ACETAMINOPHEN 10; 325 MG/1; MG/1
1 TABLET ORAL ONCE
Status: COMPLETED | OUTPATIENT
Start: 2017-07-27 | End: 2017-07-27

## 2017-07-27 RX ADMIN — DOCUSATE SODIUM 100 MG: 100 CAPSULE, LIQUID FILLED ORAL at 10:07

## 2017-07-27 RX ADMIN — OXYCODONE HYDROCHLORIDE AND ACETAMINOPHEN 1 TABLET: 10; 325 TABLET ORAL at 10:07

## 2017-07-27 RX ADMIN — OXYCODONE HYDROCHLORIDE AND ACETAMINOPHEN 1 TABLET: 10; 325 TABLET ORAL at 02:07

## 2017-07-27 RX ADMIN — ONDANSETRON 8 MG: 8 TABLET, ORALLY DISINTEGRATING ORAL at 12:07

## 2017-07-27 RX ADMIN — ONDANSETRON 8 MG: 8 TABLET, ORALLY DISINTEGRATING ORAL at 05:07

## 2017-07-27 RX ADMIN — HYDROCODONE BITARTRATE AND ACETAMINOPHEN 1 TABLET: 5; 325 TABLET ORAL at 06:07

## 2017-07-27 RX ADMIN — ALUMINUM HYDROXIDE, MAGNESIUM HYDROXIDE, AND SIMETHICONE 30 ML: 200; 200; 20 SUSPENSION ORAL at 06:07

## 2017-07-27 RX ADMIN — OXYCODONE HYDROCHLORIDE AND ACETAMINOPHEN 1 TABLET: 10; 325 TABLET ORAL at 06:07

## 2017-07-27 RX ADMIN — HYDROCODONE BITARTRATE AND ACETAMINOPHEN 1 TABLET: 10; 325 TABLET ORAL at 09:07

## 2017-07-27 NOTE — SUBJECTIVE & OBJECTIVE
Hospital course: Pt was admitted for IOL at 37 w0d secondary to Pre-eclampsia.  Pt was started on Cytotec induction.  Labor progressed but eventually stalled.  PLTCS was performed without complications on 7/25/17 and pt placed on MgSO4 for 24hours.     Interval History: no preE symptoms. No h/a vision change RUQ pain.     She is doing well this morning. She is tolerating a regular diet without nausea or vomiting. She is voiding spontaneously. She is not ambulating. She has not passed flatus, and has not a BM. Vaginal bleeding is moderate. She denies fever or chills. Abdominal pain is moderate and controlled with oral medications. She is going to try breastfeeding.      Objective:     Vital Signs (Most Recent):  Temp: 98 °F (36.7 °C) (07/26/17 1920)  Pulse: 96 (07/27/17 0200)  Resp: 18 (07/26/17 1920)  BP: 122/76 (07/27/17 0430)  SpO2: 95 % (07/26/17 2130) Vital Signs (24h Range):  Temp:  [98 °F (36.7 °C)] 98 °F (36.7 °C)  Pulse:  [] 96  Resp:  [18] 18  SpO2:  [76 %-99 %] 95 %  BP: (122-160)/() 122/76       Vitals:    07/26/17 2245 07/27/17 0000 07/27/17 0200 07/27/17 0430   BP: (!) 146/99 (!) 142/84 (!) 150/96 122/76   Pulse: 98 98 96    Resp:       Temp:       TempSrc:       SpO2:       Weight:          Weight: 120.4 kg (265 lb 6.9 oz)  Body mass index is 42.84 kg/m².      Intake/Output Summary (Last 24 hours) at 07/27/17 0733  Last data filed at 07/26/17 2142   Gross per 24 hour   Intake              150 ml   Output             1850 ml   Net            -1700 ml       Significant Labs:  Lab Results   Component Value Date    GROUPTRH O POS 07/24/2017    HEPBSAG Negative 12/16/2016    STREPBCULT STREPTOCOCCUS AGALACTIAE (GROUP B) 07/18/2017     No results for input(s): HGB, HCT in the last 48 hours.  UOP per night RN is over 100cc/hr    Physical Exam:   Constitutional: She is oriented to person, place, and time. She appears well-developed and well-nourished.    HENT:   Head: Normocephalic.    Eyes: Pupils  are equal, round, and reactive to light.    Neck: Normal range of motion. No thyromegaly present.    Cardiovascular: Normal heart sounds.     Pulmonary/Chest: Effort normal.        Abdominal: Soft. She exhibits no mass. There is no tenderness.             Musculoskeletal: Normal range of motion and moves all extremeties. She exhibits no edema.   No Ulises's       Neurological: She is alert and oriented to person, place, and time. She has normal reflexes.    Skin: Skin is warm and dry.    Psychiatric: She has a normal mood and affect.

## 2017-07-27 NOTE — PROGRESS NOTES
Pt refused pencillin. Pt stated she would not do or take anything until she received pain medication. Pt refused SVE. CNMARTHA and Dr. Babb notified

## 2017-07-27 NOTE — LACTATION NOTE
Upon entering room mom appears asleep. Discussed feeding choices with dad. Mom began to mumble that she will try breastfeeding but would not open eyes. Discussed with mom and dad the effects of formula feeding on milk supply. Mom still not interested in opening her eyes to talk. Talked with primary nurse, nurse also states there are some social problems and a social service consult has been ordered to evaluate her ability to care for infant. Mom also was positive for THC in May. Primary nurse aware to call lactation if mom needs assistance with breastfeeding. Mom had all bottle feedings last night.      07/27/17 1150   Infant Assessment   Number of Stools (24 hours) 5   Number of Voids (24 hours) 7   Maternal Infant Feeding   Breastfeeding Education adequate infant intake;importance of skin-to-skin contact;milk expression, hand   Lactation Interventions   Breastfeeding Assistance support offered   Maternal Breastfeeding Support maternal nutrition promoted;maternal rest encouraged;maternal hydration promoted;infant-mother separation minimized;encouragement offered;diary/feeding log utilized;lactation counseling provided

## 2017-07-27 NOTE — NURSING
Pt assisted to [facility] x2 RN's post magnesium.  Pt denies any blurred vison/dizziness.  Voided 600 cc's.  Lucille care teaching done.  Pt verbalized understanding.  Linen changed on bed.  Gown changed.  Pt back in bed.  Call light in pt's reach.  Denies any needs at this time.  Will continue to monitor.

## 2017-07-27 NOTE — PLAN OF CARE
Problem: Patient Care Overview  Goal: Plan of Care Review  Outcome: Ongoing (interventions implemented as appropriate)  Pt progressing well. Vital signs stable. Pain and nausea well controlled now with PO meds. Pt ambulating ad rk in room, encouraged pt to ambulate down [place]. Pt reports passing flatus. Will remove pressure dressing after pt showers per pt request to wait. Pt affect and mood much improved from morning and has done skin to skin time and held infant this afternoon. Formula feeding infant. Denies CNS symptoms. Will con't to monitor.

## 2017-07-27 NOTE — NURSING
Patient's affect very flat, does not make eye contact and mumbles when asked a question.  No evidence of bonding with infant and no interaction.  Inappropriate glances at infant when infant crying during assessment.  Physician at bedside and observed this behavior.   consult ordered.

## 2017-07-27 NOTE — NURSING
Attempted to remove pt's pressure dressing. Half of it removed, pt refused rest of removal at this time. Pt desires to wait until pain medicine takes effect. Will give report to oncoming RN to finish removing dressing.

## 2017-07-27 NOTE — PROGRESS NOTES
[facility] -   Obstetrics  Postpartum Progress Note    Patient Name: Juliet Jalloh  MRN: [id number]  Admission Date: 2017  Hospital Length of Stay: 3 days  Attending Physician: Gale Engle MD  Primary Care Provider: Primary Doctor No    Subjective:     Principal Problem:Status post primary low transverse  section    Hospital course: Pt was admitted for IOL at 37 w0d secondary to Pre-eclampsia.  Pt was started on Cytotec induction.  Labor progressed but eventually stalled.  PLTCS was performed without complications on 17 and pt placed on MgSO4 for 24hours.     Interval History: no preE symptoms. No h/a vision change RUQ pain.     She is doing well this morning. She is tolerating a regular diet without nausea or vomiting. She is voiding spontaneously. She is not ambulating. She has not passed flatus, and has not a BM. Vaginal bleeding is moderate. She denies fever or chills. Abdominal pain is moderate and controlled with oral medications. She is going to try breastfeeding.      Objective:     Vital Signs (Most Recent):  Temp: 98 °F (36.7 °C) (17)  Pulse: 96 (17 0200)  Resp: 18 (17)  BP: 122/76 (17 0430)  SpO2: 95 % (170) Vital Signs (24h Range):  Temp:  [98 °F (36.7 °C)] 98 °F (36.7 °C)  Pulse:  [] 96  Resp:  [18] 18  SpO2:  [76 %-99 %] 95 %  BP: (122-160)/() 122/76       Vitals:    17 2245 17 0000 17 0200 17 0430   BP: (!) 146/99 (!) 142/84 (!) 150/96 122/76   Pulse: 98 98 96    Resp:       Temp:       TempSrc:       SpO2:       Weight:          Weight: 120.4 kg (265 lb 6.9 oz)  Body mass index is 42.84 kg/m².      Intake/Output Summary (Last 24 hours) at 17 0733  Last data filed at 17 2142   Gross per 24 hour   Intake              150 ml   Output             1850 ml   Net            -1700 ml       Significant Labs:  Lab Results   Component Value Date    GROUPTR CHAYITO POS 2017     HEPBSAG Negative 2016    STREPBCULT STREPTOCOCCUS AGALACTIAE (GROUP B) 2017     No results for input(s): HGB, HCT in the last 48 hours.  UOP per night RN is over 100cc/hr    Physical Exam:   Constitutional: She is oriented to person, place, and time. She appears well-developed and well-nourished.    HENT:   Head: Normocephalic.    Eyes: Pupils are equal, round, and reactive to light.    Neck: Normal range of motion. No thyromegaly present.    Cardiovascular: Normal heart sounds.     Pulmonary/Chest: Effort normal.        Abdominal: Soft. She exhibits no mass. There is no tenderness.             Musculoskeletal: Normal range of motion and moves all extremeties. She exhibits no edema.   No Ulises's       Neurological: She is alert and oriented to person, place, and time. She has normal reflexes.    Skin: Skin is warm and dry.    Psychiatric: She has a normal mood and affect.       Assessment/Plan:     22 y.o. female  for:    Pre-eclampsia in third trimester    Continue Magnesium sulfate for total of 24 hrs  Blood pressures stable off antihypertensives        GBS (group B Streptococcus carrier), +RV culture, currently pregnant    Start PCN prophylaxis per protocol with start of regular contractions and/or SROM        Elevated BP without diagnosis of hypertension    Will waatch closely PP. Pressures improving since delivery and not in severe range.         Herpes simplex virus (HSV) infection    No outbreak- taking suppression RX past month        * Status post primary low transverse  section    Pod #1/2  S/p c/section for failed induction (max dilation of 5 cm) for preE  Advance diet, ambulate once magnesium discontinued  Anticipate discharge in 2-3 days             Disposition: As patient meets milestones, will plan to discharge, possibly tomorrow.    Gale Engle MD  Obstetrics  [facility]

## 2017-07-27 NOTE — PLAN OF CARE
"Attempted to meet with pt per consult. Pt's mother states she is "having a reaction to the pain medication" and pt motioning for MSW to return later. Mother states RN and MD aware of reaction. MSW will return to meet with pt tomorrow morning.   "

## 2017-07-28 PROCEDURE — 99232 SBSQ HOSP IP/OBS MODERATE 35: CPT | Mod: ,,, | Performed by: OBSTETRICS & GYNECOLOGY

## 2017-07-28 PROCEDURE — 25000003 PHARM REV CODE 250: Performed by: OBSTETRICS & GYNECOLOGY

## 2017-07-28 PROCEDURE — 11000001 HC ACUTE MED/SURG PRIVATE ROOM

## 2017-07-28 PROCEDURE — 25000003 PHARM REV CODE 250: Performed by: ADVANCED PRACTICE MIDWIFE

## 2017-07-28 RX ORDER — OXYCODONE AND ACETAMINOPHEN 5; 325 MG/1; MG/1
1 TABLET ORAL EVERY 4 HOURS PRN
Status: DISCONTINUED | OUTPATIENT
Start: 2017-07-28 | End: 2017-07-29 | Stop reason: HOSPADM

## 2017-07-28 RX ORDER — OXYCODONE AND ACETAMINOPHEN 10; 325 MG/1; MG/1
1 TABLET ORAL EVERY 4 HOURS PRN
Status: DISCONTINUED | OUTPATIENT
Start: 2017-07-28 | End: 2017-07-29 | Stop reason: HOSPADM

## 2017-07-28 RX ADMIN — OXYCODONE HYDROCHLORIDE AND ACETAMINOPHEN 1 TABLET: 10; 325 TABLET ORAL at 08:07

## 2017-07-28 RX ADMIN — HYDROCODONE BITARTRATE AND ACETAMINOPHEN 1 TABLET: 10; 325 TABLET ORAL at 04:07

## 2017-07-28 RX ADMIN — ONDANSETRON 8 MG: 8 TABLET, ORALLY DISINTEGRATING ORAL at 03:07

## 2017-07-28 RX ADMIN — ALUMINUM HYDROXIDE, MAGNESIUM HYDROXIDE, AND SIMETHICONE 30 ML: 200; 200; 20 SUSPENSION ORAL at 09:07

## 2017-07-28 RX ADMIN — SIMETHICONE CHEW TAB 80 MG 80 MG: 80 TABLET ORAL at 08:07

## 2017-07-28 RX ADMIN — DOCUSATE SODIUM 100 MG: 100 CAPSULE, LIQUID FILLED ORAL at 08:07

## 2017-07-28 RX ADMIN — OXYCODONE HYDROCHLORIDE AND ACETAMINOPHEN 1 TABLET: 10; 325 TABLET ORAL at 09:07

## 2017-07-28 RX ADMIN — OXYCODONE HYDROCHLORIDE AND ACETAMINOPHEN 1 TABLET: 10; 325 TABLET ORAL at 03:07

## 2017-07-28 RX ADMIN — SERTRALINE HYDROCHLORIDE 50 MG: 50 TABLET ORAL at 08:07

## 2017-07-28 RX ADMIN — ALUMINUM HYDROXIDE, MAGNESIUM HYDROXIDE, AND SIMETHICONE 30 ML: 200; 200; 20 SUSPENSION ORAL at 04:07

## 2017-07-28 NOTE — SUBJECTIVE & OBJECTIVE
Hospital course: Pt was admitted for IOL at 37 w0d secondary to Pre-eclampsia.  Pt was started on Cytotec induction.  Labor progressed but eventually stalled.  PLTCS was performed without complications on 7/25/17 and pt placed on MgSO4 for 24hours.     Interval History:   Patient c/o incisional pain that is not well relieved with hydrocodone.  She requests to switch back to oxycodone, which she felt worked better.  She is ambulating, voiding, and maddie po.          Objective:     Vital Signs (Most Recent):  Temp: 98.5 °F (36.9 °C) (07/28/17 0400)  Pulse: 104 (07/28/17 0400)  Resp: 20 (07/28/17 0400)  BP: 136/82 (07/28/17 0400)  SpO2: 95 % (07/26/17 2130) Vital Signs (24h Range):  Temp:  [98.1 °F (36.7 °C)-99.4 °F (37.4 °C)] 98.5 °F (36.9 °C)  Pulse:  [] 104  Resp:  [20] 20  BP: (119-150)/(58-91) 136/82     Weight: 120.4 kg (265 lb 6.9 oz)  Body mass index is 42.84 kg/m².    No intake or output data in the 24 hours ending 07/28/17 0740    Significant Labs:  Lab Results   Component Value Date    GROUPTRH O POS 07/24/2017    HEPBSAG Negative 12/16/2016    STREPBCULT STREPTOCOCCUS AGALACTIAE (GROUP B) 07/18/2017     No results for input(s): HGB, HCT in the last 48 hours.    I have personallly reviewed all pertinent lab results from the last 24 hours.    Physical Exam:   Constitutional: She is oriented to person, place, and time. She appears well-developed and well-nourished. No distress.      Neck: Neck supple.    Cardiovascular: Normal rate.     Pulmonary/Chest: Effort normal.        Abdominal: Soft. She exhibits no distension. There is no tenderness.             Musculoskeletal: She exhibits no edema or tenderness.       Neurological: She is alert and oriented to person, place, and time.     Psychiatric: She has a normal mood and affect.

## 2017-07-28 NOTE — PROGRESS NOTES
[facility] -   Obstetrics  Postpartum Progress Note    Patient Name: Juliet Jalloh  MRN: [id number]  Admission Date: 2017  Hospital Length of Stay: 4 days  Attending Physician: Gale Engle MD  Primary Care Provider: Primary Doctor No    Subjective:     Principal Problem:Status post primary low transverse  section    Hospital course: Pt was admitted for IOL at 37 w0d secondary to Pre-eclampsia.  Pt was started on Cytotec induction.  Labor progressed but eventually stalled.  PLTCS was performed without complications on 17 and pt placed on MgSO4 for 24hours.     Interval History:   Patient c/o incisional pain that is not well relieved with hydrocodone.  She requests to switch back to oxycodone, which she felt worked better.  She is ambulating, voiding, and maddie po.          Objective:     Vital Signs (Most Recent):  Temp: 98.5 °F (36.9 °C) (17 0400)  Pulse: 104 (17 0400)  Resp: 20 (17 0400)  BP: 136/82 (17 0400)  SpO2: 95 % (17 2130) Vital Signs (24h Range):  Temp:  [98.1 °F (36.7 °C)-99.4 °F (37.4 °C)] 98.5 °F (36.9 °C)  Pulse:  [] 104  Resp:  [20] 20  BP: (119-150)/(58-91) 136/82     Weight: 120.4 kg (265 lb 6.9 oz)  Body mass index is 42.84 kg/m².    No intake or output data in the 24 hours ending 17 0740    Significant Labs:  Lab Results   Component Value Date    GROUPTRH O POS 2017    HEPBSAG Negative 2016    STREPBCULT STREPTOCOCCUS AGALACTIAE (GROUP B) 2017     No results for input(s): HGB, HCT in the last 48 hours.    I have personallly reviewed all pertinent lab results from the last 24 hours.    Physical Exam:   Constitutional: She is oriented to person, place, and time. She appears well-developed and well-nourished. No distress.      Neck: Neck supple.    Cardiovascular: Normal rate.     Pulmonary/Chest: Effort normal.        Abdominal: Soft. She exhibits no distension. There is no tenderness.              Musculoskeletal: She exhibits no edema or tenderness.       Neurological: She is alert and oriented to person, place, and time.     Psychiatric: She has a normal mood and affect.       Assessment/Plan:     22 y.o. female  for:    Pre-eclampsia in third trimester    Continue Magnesium sulfate for total of 24 hrs  Blood pressures stable off antihypertensives        GBS (group B Streptococcus carrier), +RV culture, currently pregnant    Start PCN prophylaxis per protocol with start of regular contractions and/or SROM        Elevated BP without diagnosis of hypertension    Blood pressures stable at present time on no meds.         Herpes simplex virus (HSV) infection    No outbreak- taking suppression RX past month        * Status post primary low transverse  section    Pod #3  S/p c/section for failed induction (max dilation of 5 cm) for preE  Pain control issues today.  Will switch back to oxycodone.  Anticipate discharge tomorrow.            Disposition: As patient meets milestones, will plan to discharge.    Irene Car MD  Obstetrics  [facility]

## 2017-07-28 NOTE — CONSULTS
"Returned to pt's room this morning. Pt feeling and looking much better today. Pt's friend and sister in room for support.   Pt states she accepted her Zoloft today after refusing it the past few days. Pt states she was nervous to take it, because her mom told her she would get addicted and that it was bad to take during pregnancy. Pt states she plans to continue taking it as prescribed, because she knows now that she needs it now.   Pt is going through a lot of things such EVE's involvement, moving place to place, and not being able to return to school at [employer]. Pt also states she "just had a bad pregnancy." Emotional support provided.     Pt states she does have a stable place to stay at the moment until she can get back on her own. Pt's friend is letting her borrow pack n play so baby has a safe place to sleep until she can get a crib. FOB's mom will be purchasing a car seat to bring to hospital. Pt's sister will go today to  essential items from [employer] for baby.    Pt's mother lives in [place], and she may move in with her mother within the next couple of months. Pt states she does have a good support system here in [place] if she decides to stay, though.    MSW provided pt with community resources including housing and counseling information. Also educated pt on post partum depression. Encouraged pt to utilize coping skills discussed with counselor she was seeing at [facility]. Pt expressed understanding.  Encouraged pt to contact MSW if any other needs/questions arise.  "

## 2017-07-28 NOTE — PLAN OF CARE
Problem: Patient Care Overview  Goal: Plan of Care Review  Outcome: Ongoing (interventions implemented as appropriate)  The patient was alert and oriented. VSS. The patient's pain has been adequately controlled.Mother and infant bonding well. The patient stated that she is at times feeling depressed and that she refuses to take the zoloft but will ask her practitioner for another medication. The patient has bed in low position, call light is within reach. Will continue to monitor the patient.

## 2017-07-28 NOTE — PLAN OF CARE
Problem: Patient Care Overview  Goal: Plan of Care Review  Outcome: Ongoing (interventions implemented as appropriate)  Pt progressing ok, pain is better controlled with po percocet. Changed from norco due to that not helping as well, having pt eat before taking percocet. Encouraged her to walk more today, has only wanted to lay in bed for this shift so far. Very slow moving to get to the bathroom this am. Bonding a little better today with infant, states she does not have anything for baby at home and no help as well. Ileana - aware and gave resources. Bleeding light, fundus firm. Dressing with small amount of drainage noted, peripad placed between it to help with moisture. Friend at  helping.

## 2017-07-28 NOTE — ASSESSMENT & PLAN NOTE
Pod #3  S/p c/section for failed induction (max dilation of 5 cm) for preE  Pain control issues today.  Will switch back to oxycodone.  Anticipate discharge tomorrow.

## 2017-07-29 ENCOUNTER — TELEPHONE (OUTPATIENT)
Dept: OBSTETRICS AND GYNECOLOGY | Facility: HOSPITAL | Age: 22
End: 2017-07-29

## 2017-07-29 VITALS
WEIGHT: 265.44 LBS | OXYGEN SATURATION: 95 % | BODY MASS INDEX: 42.84 KG/M2 | RESPIRATION RATE: 18 BRPM | TEMPERATURE: 99 F | HEART RATE: 77 BPM | DIASTOLIC BLOOD PRESSURE: 83 MMHG | SYSTOLIC BLOOD PRESSURE: 151 MMHG

## 2017-07-29 PROCEDURE — 25000003 PHARM REV CODE 250: Performed by: OBSTETRICS & GYNECOLOGY

## 2017-07-29 PROCEDURE — 99238 HOSP IP/OBS DSCHRG MGMT 30/<: CPT | Mod: ,,, | Performed by: OBSTETRICS & GYNECOLOGY

## 2017-07-29 RX ORDER — OXYCODONE AND ACETAMINOPHEN 5; 325 MG/1; MG/1
1 TABLET ORAL EVERY 6 HOURS PRN
Qty: 30 TABLET | Refills: 0 | Status: SHIPPED | OUTPATIENT
Start: 2017-07-29 | End: 2017-07-29

## 2017-07-29 RX ORDER — IBUPROFEN 800 MG/1
800 TABLET ORAL EVERY 8 HOURS PRN
Qty: 60 TABLET | Refills: 2 | Status: SHIPPED | OUTPATIENT
Start: 2017-07-29 | End: 2018-03-02 | Stop reason: ALTCHOICE

## 2017-07-29 RX ORDER — OXYCODONE AND ACETAMINOPHEN 5; 325 MG/1; MG/1
1 TABLET ORAL EVERY 8 HOURS PRN
Qty: 30 TABLET | Refills: 0 | Status: SHIPPED | OUTPATIENT
Start: 2017-07-29 | End: 2018-03-02

## 2017-07-29 RX ADMIN — SERTRALINE HYDROCHLORIDE 50 MG: 50 TABLET ORAL at 08:07

## 2017-07-29 RX ADMIN — OXYCODONE HYDROCHLORIDE AND ACETAMINOPHEN 1 TABLET: 10; 325 TABLET ORAL at 12:07

## 2017-07-29 RX ADMIN — DOCUSATE SODIUM 100 MG: 100 CAPSULE, LIQUID FILLED ORAL at 08:07

## 2017-07-29 NOTE — DISCHARGE INSTRUCTIONS
"Mother Self Care:    Activity: Avoid strenuous exercise and get adequate rest.  No driving until the physician consent given.  Emotional Changes: Most women find birth to be a time of great emotional upheaval.  Sense of loss, mood swings, fatigue, anxiety, and feeling "let down" are common.  If feelings worsen or last more than a week, call your physician.  Breast Care/Breastfeeding: Wear a bra for comfort.  Keep nipples dry and apply your own breast milk or lanolin cream as needed for soreness.  Engorgement can be relieved with warm, moist heat before feedings.  You may also take Ibuprofen.  Breast Care/Bottle Feeding: Wear support bra 24 hours a day for one week.  Avoid stimulation to breasts.  You may use ice packs for discomfort.  Ariela-Care/Vaginal Bleeding: Remember to use your ariela-bottle after urinating.  Your flow will change from red, to pink, to yellow/white color over a period of 2 weeks.  Menstruation will return in 3-8 weeks, or longer if breastfeeding.  Episiotomy Vaginal Delivery: Stitches will dissolve within 10 days to 3 weeks.  Warm baths, tucks, and dermoplast will promote healing.  Avoid bubble baths or strong soaps.   Section/Tubal Ligation: Keep incision clean and dry.  Please remove steri-strips in 5-7 days.  You may shower, but avoid baths.  Sexual Activity/Pelvic Rest: No sexual activity, tampons, or douching until your physician gives you consent.  Diet: Continue to eat from the five basic food groups, including plenty of protein, fruits, vegetables, and whole grains.  Limit empty calories and high fat foods.  Drink enough fluids to satisfy thirst and add an extra 500 calories for breastfeeding.  Constipation/Hemorrhoids: Drink plenty of water.  You may take a stool softener or natural laxative (Metamucil). You may use tucks or hemorrhoid ointment and soak in a warm tub.    CALL YOUR OB DOCTOR IF ANY OF THE FOLLOWING OCCURS:  *Heavy bleeding - saturating a pad an hour or passing any " large (2-3 inches in size) blood clots.  *Any pain, redness, or tenderness in lower leg.  *You cannot care for yourself or your baby.  *Any signs of infection-      - Temperature greater than 100.5 degrees F      - Foul smelling vaginal discharge and/or incisional drainage      - Increased episiotomy or incisional pain      - Hot, hard, red or sore area on breast      - Flu-like symptoms      - Any urgency, frequency or burning with urination

## 2017-07-29 NOTE — SUBJECTIVE & OBJECTIVE
Hospital course: Pt was admitted for IOL at 37 w0d secondary to Pre-eclampsia.  Pt was started on Cytotec induction.  Labor progressed but eventually stalled.  PLTCS was performed without complications on 7/25/17 and pt placed on MgSO4 for 24hours.     During PP stay, bp were stable, pt did not require antihypertensives.  She was evaluated for depression. Restarted her zoloft on her request. She had no SI HI and during her stay her mood improved. She admitted to being dissatisfied with the FOB and his lack of maturity and help, however she has a best friend and mother that are a great support. She was given depression precautions w reasons to RTER immed. She will f/u this wk w Lizbeth for bandage removal, bp ck and zoloft f/u.     O/w no c/. Pt today is chatty and smiling.  Ambulating with soreness but percocet pain medicine makes it tolerable and she feels functional. No clammy feeling.  No cp sob palp. No n/v/f.     Objective:     Vital Signs (Most Recent):  Temp: 98.9 °F (37.2 °C) (07/29/17 0800)  Pulse: 84 (07/29/17 0800)  Resp: 18 (07/29/17 0800)  BP: 129/64 (07/29/17 0800)  SpO2: 95 % (07/26/17 2130) Vital Signs (24h Range):  Temp:  [98.2 °F (36.8 °C)-98.9 °F (37.2 °C)] 98.9 °F (37.2 °C)  Pulse:  [75-86] 84  Resp:  [16-20] 18  BP: (129-168)/(64-92) 129/64     Weight: 120.4 kg (265 lb 6.9 oz)  Body mass index is 42.84 kg/m².    No intake or output data in the 24 hours ending 07/29/17 1042    Significant Labs:  Lab Results   Component Value Date    GROUPTRH O POS 07/24/2017    HEPBSAG Negative 12/16/2016    STREPBCULT STREPTOCOCCUS AGALACTIAE (GROUP B) 07/18/2017     No results for input(s): HGB, HCT in the last 48 hours.    I have personallly reviewed all pertinent lab results from the last 24 hours.    Physical Exam:   Constitutional: She is oriented to person, place, and time. She appears well-developed and well-nourished.    HENT:   Head: Normocephalic.    Eyes: Pupils are equal, round, and reactive to  light.    Neck: Normal range of motion. No thyromegaly present.    Cardiovascular: Normal heart sounds.     Pulmonary/Chest: Effort normal.        Abdominal: Soft. She exhibits no mass. There is no tenderness.             Musculoskeletal: Normal range of motion and moves all extremeties. She exhibits no edema.       Neurological: She is alert and oriented to person, place, and time. She has normal reflexes.    Skin: Skin is warm and dry.    Psychiatric: She has a normal mood and affect.   inc CD bandage

## 2017-07-29 NOTE — DISCHARGE SUMMARY
[facility] -   Obstetrics  Postpartum Progress Note     Patient Name: Juliet Jalloh  MRN: [id number]  Admission Date: 2017  Hospital Length of Stay: 5 days  Attending Physician: Gale Engle MD  Primary Care Provider: Primary Doctor No     Subjective:      Principal Problem:Status post primary low transverse  section     Hospital course: Pt was admitted for IOL at 37 w0d secondary to Pre-eclampsia.  Pt was started on Cytotec induction.  Labor progressed but eventually stalled.  PLTCS was performed without complications on 17 and pt placed on MgSO4 for 24hours.      During PP stay, bp were stable, pt did not require antihypertensives.  She was evaluated for depression. Restarted her zoloft on her request. She had no SI HI and during her stay her mood improved. She admitted to being dissatisfied with the FOB and his lack of maturity and help, however she has a best friend and mother that are a great support. She was given depression precautions w reasons to RTER immed. She will f/u this wk w Lizbeth for bandage removal, bp ck and zoloft f/u.      O/w no c/. Pt today is chatty and smiling.  Ambulating with soreness but percocet pain medicine makes it tolerable and she feels functional. No clammy feeling.  No cp sob palp. No n/v/f.      Objective:      Vital Signs (Most Recent):  Temp: 98.9 °F (37.2 °C) (17 0800)  Pulse: 84 (17 0800)  Resp: 18 (17 0800)  BP: 129/64 (17 0800)  SpO2: 95 % (17 2130) Vital Signs (24h Range):  Temp:  [98.2 °F (36.8 °C)-98.9 °F (37.2 °C)] 98.9 °F (37.2 °C)  Pulse:  [75-86] 84  Resp:  [16-20] 18  BP: (129-168)/(64-92) 129/64      Weight: 120.4 kg (265 lb 6.9 oz)  Body mass index is 42.84 kg/m².     No intake or output data in the 24 hours ending 17 1042     Significant Labs:        Lab Results   Component Value Date     GROUPTRH O POS 2017     HEPBSAG Negative 2016     STREPBCULT STREPTOCOCCUS AGALACTIAE  (GROUP B) 2017      No results for input(s): HGB, HCT in the last 48 hours.     I have personallly reviewed all pertinent lab results from the last 24 hours.     Physical Exam:   Constitutional: She is oriented to person, place, and time. She appears well-developed and well-nourished.    HENT:   Head: Normocephalic.    Eyes: Pupils are equal, round, and reactive to light.    Neck: Normal range of motion. No thyromegaly present.    Cardiovascular: Normal heart sounds.     Pulmonary/Chest: Effort normal.         Abdominal: Soft. She exhibits no mass. There is no tenderness.             Musculoskeletal: Normal range of motion and moves all extremeties. She exhibits no edema.       Neurological: She is alert and oriented to person, place, and time. She has normal reflexes.    Skin: Skin is warm and dry.    Psychiatric: She has a normal mood and affect.   inc CD bandage     Assessment/Plan:      22 y.o. female  for:         Pre-eclampsia in third trimester     Continue Magnesium sulfate for total of 24 hrs  Blood pressures stable off antihypertensives       GBS (group B Streptococcus carrier), +RV culture, currently pregnant     Start PCN prophylaxis per protocol with start of regular contractions and/or SROM       Elevated BP without diagnosis of hypertension     Blood pressures stable at present time on no meds.        Depression affecting pregnancy in third trimester, antepartum     Stable since delivery - no SI/HI. Declined zoloft, says she feels stable w/o it. SS consult placed for resources info for PP depression.   rec provider f/u at 1wk PP w bandage removal, so depression risk can be reassessed.       Herpes simplex virus (HSV) infection     No outbreak- taking suppression RX past month       * Status post primary low transverse  section     Pod #3  S/p c/section for failed induction (max dilation of 5 cm) for preE  Pain controlled on oxycodone.                Disposition: home.     Gale  JAMESON Engle MD  Obstetrics  [facility]

## 2017-07-29 NOTE — PROGRESS NOTES
[facility] -   Obstetrics  Postpartum Progress Note    Patient Name: Juliet Jalloh  MRN: [id number]  Admission Date: 2017  Hospital Length of Stay: 5 days  Attending Physician: Gale Engle MD  Primary Care Provider: Primary Doctor No    Subjective:     Principal Problem:Status post primary low transverse  section    Hospital course: Pt was admitted for IOL at 37 w0d secondary to Pre-eclampsia.  Pt was started on Cytotec induction.  Labor progressed but eventually stalled.  PLTCS was performed without complications on 17 and pt placed on MgSO4 for 24hours.     During PP stay, bp were stable, pt did not require antihypertensives.  She was evaluated for depression. Restarted her zoloft on her request. She had no SI HI and during her stay her mood improved. She admitted to being dissatisfied with the FOB and his lack of maturity and help, however she has a best friend and mother that are a great support. She was given depression precautions w reasons to RTER immed. She will f/u this wk w Lizbeth for bandage removal, bp ck and zoloft f/u.     O/w no c/. Pt today is chatty and smiling.  Ambulating with soreness but percocet pain medicine makes it tolerable and she feels functional. No clammy feeling.  No cp sob palp. No n/v/f.     Objective:     Vital Signs (Most Recent):  Temp: 98.9 °F (37.2 °C) (17 0800)  Pulse: 84 (17 0800)  Resp: 18 (17 0800)  BP: 129/64 (17 0800)  SpO2: 95 % (17 2130) Vital Signs (24h Range):  Temp:  [98.2 °F (36.8 °C)-98.9 °F (37.2 °C)] 98.9 °F (37.2 °C)  Pulse:  [75-86] 84  Resp:  [16-20] 18  BP: (129-168)/(64-92) 129/64     Weight: 120.4 kg (265 lb 6.9 oz)  Body mass index is 42.84 kg/m².    No intake or output data in the 24 hours ending 17 1042    Significant Labs:  Lab Results   Component Value Date    GROUPTRH O POS 2017    HEPBSAG Negative 2016    STREPBCULT STREPTOCOCCUS AGALACTIAE (GROUP B)  2017     No results for input(s): HGB, HCT in the last 48 hours.    I have personallly reviewed all pertinent lab results from the last 24 hours.    Physical Exam:   Constitutional: She is oriented to person, place, and time. She appears well-developed and well-nourished.    HENT:   Head: Normocephalic.    Eyes: Pupils are equal, round, and reactive to light.    Neck: Normal range of motion. No thyromegaly present.    Cardiovascular: Normal heart sounds.     Pulmonary/Chest: Effort normal.        Abdominal: Soft. She exhibits no mass. There is no tenderness.             Musculoskeletal: Normal range of motion and moves all extremeties. She exhibits no edema.       Neurological: She is alert and oriented to person, place, and time. She has normal reflexes.    Skin: Skin is warm and dry.    Psychiatric: She has a normal mood and affect.   inc CD bandage    Assessment/Plan:     22 y.o. female  for:    Pre-eclampsia in third trimester    Continue Magnesium sulfate for total of 24 hrs  Blood pressures stable off antihypertensives        GBS (group B Streptococcus carrier), +RV culture, currently pregnant    Start PCN prophylaxis per protocol with start of regular contractions and/or SROM        Elevated BP without diagnosis of hypertension    Blood pressures stable at present time on no meds.         Depression affecting pregnancy in third trimester, antepartum    Stable since delivery - no SI/HI. Declined zoloft, says she feels stable w/o it. SS consult placed for resources info for PP depression.   rec provider f/u at 1wk PP w bandage removal, so depression risk can be reassessed.        Herpes simplex virus (HSV) infection    No outbreak- taking suppression RX past month        * Status post primary low transverse  section    Pod #3  S/p c/section for failed induction (max dilation of 5 cm) for preE  Pain controlled on oxycodone.               Disposition: home.    Gale Engle,  MD  Obstetrics  [facility]

## 2017-07-29 NOTE — PROGRESS NOTES
Pt home care instructions given. All concerns and questions answered. Pt voiced understanding of home care instructions.

## 2017-07-29 NOTE — ASSESSMENT & PLAN NOTE
Pod #3  S/p c/section for failed induction (max dilation of 5 cm) for preE  Pain controlled on oxycodone.

## 2017-07-29 NOTE — LACTATION NOTE
Lactation called to room: Patient exclusivley formula feeding since birth. Lactation called to room today to discuss breastfeeding. Upon entering patients room infant was lying on moms chest after just finishing 31ml of formula. Mom states she would like to try breastfeeding now. Discussed with mom supplementation impact of milk production and supply. Infant sleepy not feeding cues noted. Instructed mom that next time infant shows feeding cues to call lactation for assistance.      07/29/17 1055   Maternal Infant Feeding   Breastfeeding Education adequate infant intake;importance of skin-to-skin contact;increasing milk supply   Lactation Interventions   Attachment Promotion infant-mother separation minimized;privacy provided;role responsibility promoted;rooming-in promoted;skin-to-skin contact encouraged;breastfeeding assistance provided   Breastfeeding Assistance support offered   Maternal Breastfeeding Support infant-mother separation minimized;lactation counseling provided;maternal hydration promoted;maternal nutrition promoted;maternal rest encouraged;diary/feeding log utilized;encouragement offered

## 2017-08-02 ENCOUNTER — TELEPHONE (OUTPATIENT)
Dept: OBSTETRICS AND GYNECOLOGY | Facility: CLINIC | Age: 22
End: 2017-08-02

## 2017-08-02 NOTE — TELEPHONE ENCOUNTER
Patient is requesting a stronger dose of roxicet. I asked patient if she has been rotating the ibuprofen and roxicet. She stated that she never got the ibuprofen because the pharmacy did not give her any ibuprofen and also they were only able to give her 21 of the roxicet due to insurance only covering that amount.  Informed patient that she should get the ibuprofen and try rotating them with the roxicet. Patient would still like a refill on roxicet even if she can not get the dosage increased.

## 2017-08-02 NOTE — TELEPHONE ENCOUNTER
----- Message from Amina Watkins sent at 8/2/2017 11:27 AM CDT -----  Contact: pt  The pt states she needs another type of pain medication, what she has isn't helping, pt can be reached at [phone]///thxMW

## 2017-08-03 ENCOUNTER — TELEPHONE (OUTPATIENT)
Dept: OBSTETRICS AND GYNECOLOGY | Facility: CLINIC | Age: 22
End: 2017-08-03

## 2017-08-03 NOTE — TELEPHONE ENCOUNTER
Pt. coming on 8/10/17 at 2pm. [facility]    Pt would need to be seen for narcotic rx; she is 1 wk out from surgery

## 2017-08-15 ENCOUNTER — TELEPHONE (OUTPATIENT)
Dept: OBSTETRICS AND GYNECOLOGY | Facility: CLINIC | Age: 22
End: 2017-08-15

## 2017-08-15 ENCOUNTER — POSTPARTUM VISIT (OUTPATIENT)
Dept: OBSTETRICS AND GYNECOLOGY | Facility: CLINIC | Age: 22
End: 2017-08-15
Payer: MEDICAID

## 2017-08-15 VITALS
SYSTOLIC BLOOD PRESSURE: 134 MMHG | DIASTOLIC BLOOD PRESSURE: 70 MMHG | HEIGHT: 66 IN | BODY MASS INDEX: 37.56 KG/M2 | WEIGHT: 233.69 LBS

## 2017-08-15 PROBLEM — O99.820 GBS (GROUP B STREPTOCOCCUS CARRIER), +RV CULTURE, CURRENTLY PREGNANT: Status: RESOLVED | Noted: 2017-07-24 | Resolved: 2017-08-15

## 2017-08-15 PROBLEM — O99.213 OBESITY AFFECTING PREGNANCY IN THIRD TRIMESTER: Status: RESOLVED | Noted: 2017-05-09 | Resolved: 2017-08-15

## 2017-08-15 PROCEDURE — 99999 PR PBB SHADOW E&M-EST. PATIENT-LVL III: CPT | Mod: PBBFAC,,, | Performed by: ADVANCED PRACTICE MIDWIFE

## 2017-08-15 PROCEDURE — 99213 OFFICE O/P EST LOW 20 MIN: CPT | Mod: PBBFAC,PO | Performed by: ADVANCED PRACTICE MIDWIFE

## 2017-08-15 RX ORDER — ALPRAZOLAM 0.25 MG/1
0.25 TABLET ORAL 3 TIMES DAILY PRN
Qty: 30 TABLET | Refills: 0 | Status: SHIPPED | OUTPATIENT
Start: 2017-08-15 | End: 2018-03-02 | Stop reason: ALTCHOICE

## 2017-08-15 RX ORDER — SERTRALINE HYDROCHLORIDE 50 MG/1
50 TABLET, FILM COATED ORAL DAILY
Qty: 30 TABLET | Refills: 2 | Status: SHIPPED | OUTPATIENT
Start: 2017-08-15 | End: 2017-08-28

## 2017-08-15 NOTE — TELEPHONE ENCOUNTER
----- Message from Apryl Méndez MA sent at 8/15/2017  9:52 AM CDT -----  Contact: pt  Please call pt ASAP at [phone] for an appt for possible Post Partum Depression.  Pt is very distraught and would like to be fit in today if at all possible and really wants to see Lizbeth.

## 2017-08-15 NOTE — TELEPHONE ENCOUNTER
Lizbeth this is the patient you spoke with earlier so I'm forwarding this to you so you can document on it.

## 2017-08-15 NOTE — TELEPHONE ENCOUNTER
Patient called the office stating that she is depressed, having headaches and don't think that she can stay strong to take care of her child. SEPIDEH Thomson CNM took call and proceeded to talk to patient.

## 2017-08-15 NOTE — PROGRESS NOTES
Here today for pp depression  Has not taken zoloft because her mom was opposed to it  Now crying all the time unable to sleep  Moving to transition housing on [place]  Was staying with her mom but they were fighting all the time  Agrees to take zoloft daily and will take xanax for next few days  Until zoloft works  To ER if sx worsen  Her best friend will be at housing to help her  Return 1 week for evaluation

## 2017-08-23 ENCOUNTER — TELEPHONE (OUTPATIENT)
Dept: OBSTETRICS AND GYNECOLOGY | Facility: CLINIC | Age: 22
End: 2017-08-23

## 2017-08-23 NOTE — TELEPHONE ENCOUNTER
----- Message from Darling Ferreira sent at 8/23/2017  1:17 PM CDT -----  Contact: pt  She's calling in stating that she needs a Plan B pill but cant afford to purchase over the counter, wanted to see if it can be prescribed for her, please advise [phone] (home)

## 2017-08-23 NOTE — TELEPHONE ENCOUNTER
Pt called today inquiring if we are able to prescribe a Plan B Rx for her. I told her that we are unable to prescribe that because it's for purchase OTC, she states she doesn't have the money and I explained that she should refrain from sexual intercourse and get the DEPO when she comes in for her PP visit. She will then sign her IUD form that she asked to sign on previous encounters. DS

## 2017-08-28 ENCOUNTER — OFFICE VISIT (OUTPATIENT)
Dept: OBSTETRICS AND GYNECOLOGY | Facility: CLINIC | Age: 22
End: 2017-08-28
Payer: MEDICAID

## 2017-08-28 VITALS
SYSTOLIC BLOOD PRESSURE: 146 MMHG | HEIGHT: 66 IN | BODY MASS INDEX: 38.94 KG/M2 | WEIGHT: 242.31 LBS | DIASTOLIC BLOOD PRESSURE: 88 MMHG

## 2017-08-28 DIAGNOSIS — O14.93 PRE-ECLAMPSIA IN THIRD TRIMESTER: ICD-10-CM

## 2017-08-28 DIAGNOSIS — Z98.891 STATUS POST PRIMARY LOW TRANSVERSE CESAREAN SECTION: Primary | ICD-10-CM

## 2017-08-28 PROBLEM — O16.3 ELEVATED BLOOD PRESSURE AFFECTING PREGNANCY IN THIRD TRIMESTER, ANTEPARTUM: Status: RESOLVED | Noted: 2017-07-18 | Resolved: 2017-08-28

## 2017-08-28 PROCEDURE — 0503F POSTPARTUM CARE VISIT: CPT | Mod: TH,,, | Performed by: OBSTETRICS & GYNECOLOGY

## 2017-08-28 PROCEDURE — 99212 OFFICE O/P EST SF 10 MIN: CPT | Mod: PBBFAC,PO | Performed by: OBSTETRICS & GYNECOLOGY

## 2017-08-28 PROCEDURE — 99999 PR PBB SHADOW E&M-EST. PATIENT-LVL II: CPT | Mod: PBBFAC,,, | Performed by: OBSTETRICS & GYNECOLOGY

## 2017-08-28 NOTE — PROGRESS NOTES
"CC: Post-partum follow-up    Juliet Jalloh is a 22 y.o. female  who presents for post-partum visit.  She is S/P a PLTCS.  She and the baby are doing well.  No pain.  No fever.   No bowel / bladder complaints.    Delivery Date: 2017  Delivery MD: Skinny  Gender: male  Birth Weight: 6 pounds 4 ounces  Breast Feeding: NO  Depression: YES; stopped taking Zoloft and has moved out of her mother's home; reports that she feels much better now that she is out of that stressful situation and no longer feels like she needs medications  Contraception: IUD    Pregnancy was complicated by:  Pre-Eclampsia, PP Depression    BP (!) 146/88   Ht 5' 6" (1.676 m)   Wt 109.9 kg (242 lb 4.6 oz)   LMP 2016   BMI 39.11 kg/m²     ROS:  GENERAL: No fever, chills, fatigability.  CARDIOVASCULAR: No chest pain. No shortness of breath. No leg cramps.  BREAST: Denies pain. No lumps. No discharge.  ABDOMEN: No abdominal pain. Denies nausea. Denies vomiting. No diarrhea. No constipation  URINARY: No incontinence, no nocturia, no frequency and no dysuria.  VULVAR: No pain, no lesions and no itching.  VAGINAL: No relaxation, no itching, no discharge, no abnormal bleeding and no lesions.  NEUROLOGICAL: No headaches. No vision changes.    PHYSICAL EXAM:  GENERAL: Alert and oriented in no distress  CHEST: Clear to auscultation  CV; Regular rate and rhythm  ABDOMEN:  Soft, non-tender, non-distended  WOUND: Healed well  VULVA:  Normal, no lesions  CERVIX:  Without lesions, polyps or tenderness.  UTERUS:  Normal size, shape, consistency, no mass or tenderness.  ADNEXA:  Normal in size without mass or tenderness  EXTREMITIES: Non-tender, Negative Ulises's    IMP:  Doing well S/P PLTCS - Instructions / precautions reviewed  Contraceptive counseling - desires Mirena insertion  Depression - stable on no medications (pt self-discontinued Zoloft)      PLAN:  May resume normal activities  Return: 2-3 weeks for Mirena IUD " insertion

## 2017-10-16 ENCOUNTER — TELEPHONE (OUTPATIENT)
Dept: OBSTETRICS AND GYNECOLOGY | Facility: CLINIC | Age: 22
End: 2017-10-16

## 2017-10-20 ENCOUNTER — TELEPHONE (OUTPATIENT)
Dept: OBSTETRICS AND GYNECOLOGY | Facility: CLINIC | Age: 22
End: 2017-10-20

## 2017-10-20 NOTE — TELEPHONE ENCOUNTER
----- Message from Mila Watkins sent at 10/20/2017  4:38 PM CDT -----  Patient returning nurse call. Please adv/call [phone].//thanks. cw

## 2017-10-20 NOTE — TELEPHONE ENCOUNTER
Patient stated that she is still interested in the mirena.  I informed her that she is approved for the B&B option and should call back with her next menstrual cycle for insertion.  Patient voiced understanding.

## 2017-10-20 NOTE — TELEPHONE ENCOUNTER
Left message for pt to return my call. Need to find out if she is still interested in the Mirena. She is Dr. Babb' pt. And has the B&B option.

## 2017-10-23 ENCOUNTER — TELEPHONE (OUTPATIENT)
Dept: OBSTETRICS AND GYNECOLOGY | Facility: CLINIC | Age: 22
End: 2017-10-23

## 2017-10-23 NOTE — TELEPHONE ENCOUNTER
Patient on cycle. Scheduled for mirena insertion with gary on 10/26. Pt B&B. Patient verbalized understanding.

## 2017-10-23 NOTE — TELEPHONE ENCOUNTER
----- Message from Mila Croft sent at 10/23/2017  9:12 AM CDT -----  Was told to call when her cycle comes on. Please call back at [phone]. thanks

## 2017-10-26 ENCOUNTER — TELEPHONE (OUTPATIENT)
Dept: OBSTETRICS AND GYNECOLOGY | Facility: CLINIC | Age: 22
End: 2017-10-26

## 2017-10-26 ENCOUNTER — PROCEDURE VISIT (OUTPATIENT)
Dept: OBSTETRICS AND GYNECOLOGY | Facility: CLINIC | Age: 22
End: 2017-10-26
Payer: MEDICAID

## 2017-10-26 VITALS
HEIGHT: 66 IN | BODY MASS INDEX: 40.41 KG/M2 | WEIGHT: 251.44 LBS | DIASTOLIC BLOOD PRESSURE: 79 MMHG | SYSTOLIC BLOOD PRESSURE: 128 MMHG

## 2017-10-26 DIAGNOSIS — Z30.430 ENCOUNTER FOR IUD INSERTION: Primary | ICD-10-CM

## 2017-10-26 PROCEDURE — 58300 INSERT INTRAUTERINE DEVICE: CPT | Mod: S$PBB,,, | Performed by: NURSE PRACTITIONER

## 2017-10-26 PROCEDURE — 58300 INSERT INTRAUTERINE DEVICE: CPT | Mod: PBBFAC,PO | Performed by: NURSE PRACTITIONER

## 2017-10-26 NOTE — PROCEDURES
Insertin of IUD-Today  Date/Time: 10/26/2017 11:13 AM  Performed by: KATYA VERONICA  Authorized by: KATYA VERONICA   Preparation: Patient was prepped and draped in the usual sterile fashion.  Local anesthesia used: no    Anesthesia:  Local anesthesia used: no    Sedation:  Patient sedated: no  Patient tolerance: Patient tolerated the procedure well with no immediate complications      CC: IUD PLACEMENT    HPI    Juliet Jalloh is a 22 y.o. female  presents for an IUD placement.  UPT is negative.        PRE-IUD PLACEMENT COUNSELING:  All contraceptive options were reviewed and the patient chooses an IUD.  The patient's history was reviewed and there are no contraindications to an IUD. The procedure and minimal risks of pain, bleeding, perforation and infection at the insertion and spontaneous expulsion within the first two weeks was discussed. The benefits of amenorrhea and no systemic side effects were explained. All questions were answered and the patient agrees to proceed. Consent was signed (scanned into computer).    EXAM:  Uterine Position: anteverted, nontender, mobile    PROCEDURE:  TIME OUT PERFORMED.  The cervix visualized with a speculum.  A single tooth tenaculum placed on the anterior lip.  The uterus sounded to 7 cm using sterile technique.  A Mirena, Lot # [id number], Expiration date , NDC 19478-453-50 IUD was loaded and placed high in uterine fundus without difficulty using sterile technique.  The string was cut to 2cm length from exo cervix.  The tenaculum and speculum were removed. The patient tolerated the procedure well.    ASSESSMENT:  1. Contraception management / IUD insertion.    POST IUD PLACEMENT COUNSELING:  Manage post IUD placement pain with NSAIDs, Tylenol or Rx per MedCard.  IUD danger signs and how to check the strings.  Removal in 5 years for Mirena IUD     Counseling lasted approximately 15 minutes and all her questions were answered.    FOLLOW-UP: With me in two  weeks.

## 2017-10-26 NOTE — TELEPHONE ENCOUNTER
----- Message from Lizbeth Liz sent at 10/26/2017  8:34 AM CDT -----  Contact: Pt   States she's calling to have labs done for STD testing and can be reached at [phone]///thanks/dbw

## 2017-10-30 PROBLEM — O14.93 PRE-ECLAMPSIA IN THIRD TRIMESTER: Status: RESOLVED | Noted: 2017-07-24 | Resolved: 2017-10-30

## 2017-12-14 PROCEDURE — 59025 FETAL NON-STRESS TEST: CPT | Mod: 26,,, | Performed by: ADVANCED PRACTICE MIDWIFE

## 2017-12-14 NOTE — PROCEDURES
"Juliet Jalloh is a 22 y.o. female patient.    Temp: 98.6 °F (37 °C) (07/19/17 2017)  Pulse: 91 (07/20/17 0955)  Resp: 18 (07/20/17 0820)  BP: (!) 146/88 (07/20/17 1000)  Weight: 119.7 kg (264 lb) (07/19/17 2020)  Height: 5' 6" (167.6 cm) (07/19/17 2020)       Obtain Fetal nonstress test (NST)  Date/Time: 12/14/2017 11:28 AM  Performed by: LIZBETH YBARRA  Authorized by: LIZBETH YBARRA     Nonstress Test:     Variability:  6-25 BPM    Decelerations:  None    Accelerations:  15 bpm    Acoustic Stimulator: No      Uterine Irritability: No      Contractions:  Not present  Biophysical Profile:     Nonstress Test Interpretation: reactive      Overall Impression:  Reassuring  Post-procedure:     Patient tolerance:  Patient tolerated the procedure well with no immediate complications        Lizbeth Ybarra  12/14/2017  "

## 2017-12-18 ENCOUNTER — TELEPHONE (OUTPATIENT)
Dept: OBSTETRICS AND GYNECOLOGY | Facility: CLINIC | Age: 22
End: 2017-12-18

## 2017-12-18 NOTE — TELEPHONE ENCOUNTER
Left a message for the patient informing her that she will have to go through her PCP for any further refills but if she has any questions she can give us a call back at [phone].

## 2017-12-18 NOTE — TELEPHONE ENCOUNTER
----- Message from Valerie Mike sent at 12/18/2017  2:59 PM CST -----  Contact: [phone]  Would like to consult with nurse about refill on IBU 800mg and xanax 0.25 ; Please call bk at [phone]; [facility] on [address] at [facility]; please call pt bk if meds can not be refilled; thx lj

## 2017-12-18 NOTE — TELEPHONE ENCOUNTER
Patient just called back and notified that she will need to see her PCP. Patient states that she doesn't have a PCP so [facility] is trying to set her up with a PCP. Patient has been referred out to [facility] [phone].

## 2018-03-02 ENCOUNTER — OFFICE VISIT (OUTPATIENT)
Dept: OBSTETRICS AND GYNECOLOGY | Facility: CLINIC | Age: 23
End: 2018-03-02
Payer: MEDICAID

## 2018-03-02 ENCOUNTER — LAB VISIT (OUTPATIENT)
Dept: LAB | Facility: HOSPITAL | Age: 23
End: 2018-03-02
Attending: NURSE PRACTITIONER
Payer: MEDICAID

## 2018-03-02 VITALS
DIASTOLIC BLOOD PRESSURE: 72 MMHG | BODY MASS INDEX: 43.68 KG/M2 | WEIGHT: 271.81 LBS | HEIGHT: 66 IN | SYSTOLIC BLOOD PRESSURE: 130 MMHG

## 2018-03-02 DIAGNOSIS — Z30.431 IUD CHECK UP: ICD-10-CM

## 2018-03-02 DIAGNOSIS — Z01.419 ENCOUNTER FOR WELL WOMAN EXAM WITH ROUTINE GYNECOLOGICAL EXAM: ICD-10-CM

## 2018-03-02 DIAGNOSIS — Z11.3 SCREEN FOR STD (SEXUALLY TRANSMITTED DISEASE): Primary | ICD-10-CM

## 2018-03-02 DIAGNOSIS — Z00.00 PREVENTATIVE HEALTH CARE: ICD-10-CM

## 2018-03-02 DIAGNOSIS — Z11.3 SCREEN FOR STD (SEXUALLY TRANSMITTED DISEASE): ICD-10-CM

## 2018-03-02 PROCEDURE — 88175 CYTOPATH C/V AUTO FLUID REDO: CPT

## 2018-03-02 PROCEDURE — 86703 HIV-1/HIV-2 1 RESULT ANTBDY: CPT

## 2018-03-02 PROCEDURE — 99999 PR PBB SHADOW E&M-EST. PATIENT-LVL III: CPT | Mod: PBBFAC,,, | Performed by: NURSE PRACTITIONER

## 2018-03-02 PROCEDURE — 36415 COLL VENOUS BLD VENIPUNCTURE: CPT

## 2018-03-02 PROCEDURE — 87491 CHLMYD TRACH DNA AMP PROBE: CPT

## 2018-03-02 PROCEDURE — 99213 OFFICE O/P EST LOW 20 MIN: CPT | Mod: PBBFAC | Performed by: NURSE PRACTITIONER

## 2018-03-02 PROCEDURE — 99395 PREV VISIT EST AGE 18-39: CPT | Mod: S$PBB,,, | Performed by: NURSE PRACTITIONER

## 2018-03-02 PROCEDURE — 86592 SYPHILIS TEST NON-TREP QUAL: CPT

## 2018-03-02 PROCEDURE — 87480 CANDIDA DNA DIR PROBE: CPT

## 2018-03-02 NOTE — PROGRESS NOTES
"CC: Well woman exam    Juliet Jalloh is a 22 y.o. female  presents for well woman exam.  LMP: Patient's last menstrual period was 2018..  No issues, problems, or complaints.Patient wants STD assessment. Has IUD/ Mirena for the past 5 months.Last pap exam indicated HPV.       Past Medical History:   Diagnosis Date    Abnormal Pap smear of cervix     Herpes simplex virus (HSV) infection     Pre-eclampsia in third trimester 2017     Past Surgical History:   Procedure Laterality Date    MOUTH SURGERY       Social History     Social History    Marital status: Single     Spouse name: N/A    Number of children: N/A    Years of education: N/A     Occupational History    Not on file.     Social History Main Topics    Smoking status: Never Smoker    Smokeless tobacco: Never Used    Alcohol use Yes      Comment: social use     Drug use: Yes     Types: Marijuana      Comment: 7 days     Sexual activity: Yes     Partners: Male     Birth control/ protection: IUD     Other Topics Concern    Not on file     Social History Narrative    No narrative on file     Family History   Problem Relation Age of Onset    Diabetes Maternal Grandmother     Hypertension Maternal Grandmother     Heart disease Maternal Grandmother     Colon cancer Maternal Grandfather     Cancer Neg Hx      OB History      Para Term  AB Living    1 1 1 0 0 1    SAB TAB Ectopic Multiple Live Births    0 0 0 0 1          /72 (BP Location: Left arm, Patient Position: Sitting, BP Method: Medium (Manual))   Ht 5' 6" (1.676 m)   Wt 123.3 kg (271 lb 13.2 oz)   LMP 2018   Breastfeeding? No   BMI 43.87 kg/m²       ROS:  GENERAL: Denies weight gain or weight loss. Feeling well overall.   SKIN: Denies rash or lesions.   HEAD: Denies head injury or headache.   NODES: Denies enlarged lymph nodes.   CHEST: Denies chest pain or shortness of breath.   CARDIOVASCULAR: Denies palpitations or left sided chest " pain.   ABDOMEN: No abdominal pain, constipation, diarrhea, nausea, vomiting or rectal bleeding.   URINARY: No frequency, dysuria, hematuria, or burning on urination.  REPRODUCTIVE: See HPI.   BREASTS: The patient performs breast self-examination and denies pain, lumps, or nipple discharge.   HEMATOLOGIC: No easy bruisability or excessive bleeding.   MUSCULOSKELETAL: Denies joint pain or swelling.   NEUROLOGIC: Denies syncope or weakness.   PSYCHIATRIC: Denies depression, anxiety or mood swings.    PHYSICAL EXAM:  APPEARANCE: Obese AA female, in no acute distress.  AFFECT: WNL, alert and oriented x 3  SKIN: No acne or hirsutism  NECK: Neck symmetric without masses or thyromegaly  NODES: No inguinal, cervical, axillary, or femoral lymph node enlargement  CHEST: Good respiratory effect  ABDOMEN: Soft.  No tenderness or masses.    PELVIC: Normal external genitalia without lesions.  Normal hair distribution.  Adequate perineal body, normal urethral meatus.  Vagina moist and well rugated without lesions, small piece of condom seem on exam.  Cervix pink, without lesions, discharge or tenderness, IUD strings seen   No significant cystocele or rectocele.  Bimanual exam shows uterus to be normal size, regular, mobile and nontender.  Adnexa without masses or tenderness.    EXTREMITIES: No edema.    1. Screen for STD (sexually transmitted disease)  RPR    HIV-1 and HIV-2 antibodies    C. trachomatis/N. gonorrhoeae by AMP DNA Vagina    Vaginosis Screen by DNA Probe   2. Encounter for well woman exam with routine gynecological exam  Liquid-based pap smear, screening   3. Preventative health care  Liquid-based pap smear, screening    PLAN:  Pap exam  STD assessment  Condom removed intact      Patient was counseled today on A.C.S. Pap guidelines and recommendations for yearly pelvic exams, mammograms and monthly self breast exams; to see her PCP for other health maintenance.

## 2018-03-03 LAB
C TRACH DNA SPEC QL NAA+PROBE: NOT DETECTED
CANDIDA RRNA VAG QL PROBE: NEGATIVE
G VAGINALIS RRNA GENITAL QL PROBE: POSITIVE
N GONORRHOEA DNA SPEC QL NAA+PROBE: NOT DETECTED
RPR SER QL: NORMAL
T VAGINALIS RRNA GENITAL QL PROBE: NEGATIVE

## 2018-03-03 RX ORDER — METRONIDAZOLE 500 MG/1
500 TABLET ORAL EVERY 12 HOURS
Qty: 14 TABLET | Refills: 0 | Status: SHIPPED | OUTPATIENT
Start: 2018-03-03 | End: 2018-03-10

## 2018-03-05 LAB — HIV 1+2 AB+HIV1 P24 AG SERPL QL IA: NEGATIVE

## 2018-09-20 RX ORDER — VALACYCLOVIR HYDROCHLORIDE 500 MG/1
500 TABLET, FILM COATED ORAL DAILY
Qty: 30 TABLET | Refills: 0 | Status: SHIPPED | OUTPATIENT
Start: 2018-09-20 | End: 2019-02-28 | Stop reason: SDUPTHER

## 2018-11-27 PROBLEM — R03.0 ELEVATED BP WITHOUT DIAGNOSIS OF HYPERTENSION: Status: RESOLVED | Noted: 2017-07-24 | Resolved: 2018-11-27

## 2018-11-27 PROBLEM — O99.343 DEPRESSION AFFECTING PREGNANCY IN THIRD TRIMESTER, ANTEPARTUM: Status: RESOLVED | Noted: 2017-07-11 | Resolved: 2018-11-27

## 2018-11-27 PROBLEM — Z98.891 STATUS POST PRIMARY LOW TRANSVERSE CESAREAN SECTION: Status: RESOLVED | Noted: 2017-07-25 | Resolved: 2018-11-27

## 2018-11-27 PROBLEM — D50.8 IRON DEFICIENCY ANEMIA SECONDARY TO INADEQUATE DIETARY IRON INTAKE: Status: RESOLVED | Noted: 2017-06-06 | Resolved: 2018-11-27

## 2018-11-27 PROBLEM — F32.A DEPRESSION AFFECTING PREGNANCY IN THIRD TRIMESTER, ANTEPARTUM: Status: RESOLVED | Noted: 2017-07-11 | Resolved: 2018-11-27

## 2019-02-28 RX ORDER — VALACYCLOVIR HYDROCHLORIDE 500 MG/1
500 TABLET, FILM COATED ORAL DAILY
Qty: 30 TABLET | Refills: 0 | Status: SHIPPED | OUTPATIENT
Start: 2019-02-28 | End: 2019-07-31 | Stop reason: SDUPTHER

## 2019-03-11 ENCOUNTER — OFFICE VISIT (OUTPATIENT)
Dept: OBSTETRICS AND GYNECOLOGY | Facility: CLINIC | Age: 24
End: 2019-03-11
Payer: MEDICAID

## 2019-03-11 ENCOUNTER — LAB VISIT (OUTPATIENT)
Dept: LAB | Facility: HOSPITAL | Age: 24
End: 2019-03-11
Payer: MEDICAID

## 2019-03-11 VITALS
BODY MASS INDEX: 44.79 KG/M2 | WEIGHT: 278.69 LBS | SYSTOLIC BLOOD PRESSURE: 140 MMHG | HEIGHT: 66 IN | DIASTOLIC BLOOD PRESSURE: 80 MMHG

## 2019-03-11 DIAGNOSIS — R10.2 PELVIC PAIN IN FEMALE: ICD-10-CM

## 2019-03-11 DIAGNOSIS — Z01.419 WELL WOMAN EXAM WITH ROUTINE GYNECOLOGICAL EXAM: Primary | ICD-10-CM

## 2019-03-11 DIAGNOSIS — Z30.431 IUD CHECK UP: ICD-10-CM

## 2019-03-11 DIAGNOSIS — Z11.3 SCREENING EXAMINATION FOR STD (SEXUALLY TRANSMITTED DISEASE): ICD-10-CM

## 2019-03-11 PROCEDURE — 86592 SYPHILIS TEST NON-TREP QUAL: CPT

## 2019-03-11 PROCEDURE — 99395 PR PREVENTIVE VISIT,EST,18-39: ICD-10-PCS | Mod: S$PBB,,, | Performed by: MIDWIFE

## 2019-03-11 PROCEDURE — 36415 COLL VENOUS BLD VENIPUNCTURE: CPT

## 2019-03-11 PROCEDURE — 87480 CANDIDA DNA DIR PROBE: CPT

## 2019-03-11 PROCEDURE — 86703 HIV-1/HIV-2 1 RESULT ANTBDY: CPT

## 2019-03-11 PROCEDURE — 99213 OFFICE O/P EST LOW 20 MIN: CPT | Mod: PBBFAC,PN | Performed by: MIDWIFE

## 2019-03-11 PROCEDURE — 99999 PR PBB SHADOW E&M-EST. PATIENT-LVL III: ICD-10-PCS | Mod: PBBFAC,,, | Performed by: MIDWIFE

## 2019-03-11 PROCEDURE — 87510 GARDNER VAG DNA DIR PROBE: CPT

## 2019-03-11 PROCEDURE — 99999 PR PBB SHADOW E&M-EST. PATIENT-LVL III: CPT | Mod: PBBFAC,,, | Performed by: MIDWIFE

## 2019-03-11 PROCEDURE — 99395 PREV VISIT EST AGE 18-39: CPT | Mod: S$PBB,,, | Performed by: MIDWIFE

## 2019-03-11 PROCEDURE — 80074 ACUTE HEPATITIS PANEL: CPT

## 2019-03-11 PROCEDURE — 87491 CHLMYD TRACH DNA AMP PROBE: CPT

## 2019-03-11 NOTE — PROGRESS NOTES
Subjective:      Juliet Jalloh is a 23 y.o. female who presents for an annual exam. The patient has c/o pain in her ovaries during intercourse on occasion. Pt also states her and her partner have intercourse daily. The patient is sexually active. GYN screening history: last pap: was normal.      Menstrual History:  OB History      Para Term  AB Living    1 1 1 0 0 1    SAB TAB Ectopic Multiple Live Births    0 0 0 0 1           Patient's last menstrual period was 2019 (exact date).       The following portions of the patient's history were reviewed and updated as appropriate: allergies, current medications, past family history, past medical history, past social history, past surgical history and problem list.    Review of Systems  Pertinent items are noted in HPI.      Objective:      General appearance: alert, appears stated age, cooperative and no distress  Abdomen: normal findings: soft, non-tender  Pelvic: cervix normal in appearance, external genitalia normal, no adnexal masses or tenderness, no cervical motion tenderness, uterus normal size, shape, and consistency, vagina normal without discharge and IUD strings visualized through cervical os.      Assessment:      Healthy female exam.    Pelvic pain  IUD surveillance  STD screening  Plan:       All questions answered.  Blood tests: HIV, RPR, Hepatitis Panel.  Breast self exam technique reviewed and patient encouraged to perform self-exam monthly.  Contraception: mirena.  Diagnosis explained in detail, including differential.  Follow up in 1 year.  Pelvic ultrasound.  Gen probe and affirm collected, instructed pt to decrease sexual interourse for a few days

## 2019-03-12 ENCOUNTER — TELEPHONE (OUTPATIENT)
Dept: RADIOLOGY | Facility: HOSPITAL | Age: 24
End: 2019-03-12

## 2019-03-12 DIAGNOSIS — B96.89 BV (BACTERIAL VAGINOSIS): Primary | ICD-10-CM

## 2019-03-12 DIAGNOSIS — N76.0 BV (BACTERIAL VAGINOSIS): Primary | ICD-10-CM

## 2019-03-12 LAB
BACTERIAL VAGINOSIS DNA: POSITIVE
C TRACH DNA SPEC QL NAA+PROBE: NOT DETECTED
CANDIDA GLABRATA DNA: NEGATIVE
CANDIDA KRUSEI DNA: NEGATIVE
CANDIDA RRNA VAG QL PROBE: NEGATIVE
HAV IGM SERPL QL IA: NEGATIVE
HBV CORE IGM SERPL QL IA: NEGATIVE
HBV SURFACE AG SERPL QL IA: NEGATIVE
HCV AB SERPL QL IA: NEGATIVE
HIV 1+2 AB+HIV1 P24 AG SERPL QL IA: NEGATIVE
N GONORRHOEA DNA SPEC QL NAA+PROBE: NOT DETECTED
RPR SER QL: NORMAL
T VAGINALIS RRNA GENITAL QL PROBE: NEGATIVE

## 2019-03-12 RX ORDER — METRONIDAZOLE 500 MG/1
500 TABLET ORAL EVERY 12 HOURS
Qty: 14 TABLET | Refills: 0 | Status: SHIPPED | OUTPATIENT
Start: 2019-03-12 | End: 2019-03-19

## 2019-04-12 ENCOUNTER — PROCEDURE VISIT (OUTPATIENT)
Dept: OBSTETRICS AND GYNECOLOGY | Facility: CLINIC | Age: 24
End: 2019-04-12
Payer: MEDICAID

## 2019-04-12 VITALS
DIASTOLIC BLOOD PRESSURE: 70 MMHG | HEIGHT: 66 IN | BODY MASS INDEX: 44.96 KG/M2 | SYSTOLIC BLOOD PRESSURE: 116 MMHG | WEIGHT: 279.75 LBS

## 2019-04-12 DIAGNOSIS — Z30.432 ENCOUNTER FOR IUD REMOVAL: Primary | ICD-10-CM

## 2019-04-12 PROCEDURE — 58301 PR REMOVE, INTRAUTERINE DEVICE: ICD-10-PCS | Mod: S$PBB,,, | Performed by: NURSE PRACTITIONER

## 2019-04-12 PROCEDURE — 58301 REMOVE INTRAUTERINE DEVICE: CPT | Mod: PBBFAC | Performed by: NURSE PRACTITIONER

## 2019-04-12 PROCEDURE — 58301 REMOVE INTRAUTERINE DEVICE: CPT | Mod: S$PBB,,, | Performed by: NURSE PRACTITIONER

## 2019-04-12 NOTE — PROCEDURES
Procedures     IUD Removal:    23 year old female patient presenting for IUD/Mirena removal. She is not having any problems with bleeding, cramping, fever or discharge. She is able to feel the strings.    EXAM:  External female genitalia is without lesions.  Vagina is without lesions or discharge.  Cervix is pink without lesions, discharge or tenderness; IUD strings are seen on exam.  Uterus is nontender and strings are palpable.  Adnexa are nontender and without masses.    ASSESSMENT:  IUD removed intact

## 2019-04-25 ENCOUNTER — TELEPHONE (OUTPATIENT)
Dept: OBSTETRICS AND GYNECOLOGY | Facility: CLINIC | Age: 24
End: 2019-04-25

## 2019-04-25 NOTE — TELEPHONE ENCOUNTER
----- Message from Cristy Gooden NP sent at 4/25/2019  1:11 PM CDT -----  Contact: [phone]  This is because she had her IUD removed.   ----- Message -----  From: Jyoti Bolivar  Sent: 4/25/2019  11:55 AM  To: Cristy Gooden NP    would like to consult with the nurse, patient states that's she has been having some heavy bleeding really bad and would like to speak with the nurse concerning this, please call back at [phone], thanks sj

## 2019-04-25 NOTE — TELEPHONE ENCOUNTER
Spoke with pt, states that she had an IUD removed on 4/12/19. States that 2 days later she started having heavy vaginal bleeding for about 4-5 days. States that it stopped for about 3 days and then she started bleeding heavily again. States that she took a pregnancy 3 days ago and it was negative. States that she is changing her pad about every hour. Per Dr. Shira Mcrae pt just needs to be seen for first available visit. Scheduled pt for 5/8/19 with Cristy Nickerson NP at [facility]. Notified pt that if she starts bleeding heavily enough that she is saturating through a pad or more an hour then she will need to go to the ED for evaluation. Pt verbalized understanding.

## 2019-07-29 ENCOUNTER — TELEPHONE (OUTPATIENT)
Dept: OBSTETRICS AND GYNECOLOGY | Facility: CLINIC | Age: 24
End: 2019-07-29

## 2019-07-29 NOTE — TELEPHONE ENCOUNTER
Spoke with patient  States she would like HIV labs checked again and labs or ultrasound to see if she is ovulating.

## 2019-07-29 NOTE — TELEPHONE ENCOUNTER
----- Message from Andre Mora sent at 7/29/2019 10:42 AM CDT -----  Contact: self [phone]  .Type:  Patient Returning Call    Who Called:Julite Jalloh  Who Left Message for Patient:Shara   Does the patient know what this is regarding?:no  Would the patient rather a call back or a response via [facility]? Call back  Best Call Back Number:[phone]  Additional Information:

## 2019-07-29 NOTE — TELEPHONE ENCOUNTER
----- Message from Cristy Gooden NP sent at 7/27/2019  1:58 PM CDT -----  Is this patient wanting a fertility work-up? I do not understand the reason for her visit.

## 2019-07-30 ENCOUNTER — LAB VISIT (OUTPATIENT)
Dept: LAB | Facility: HOSPITAL | Age: 24
End: 2019-07-30
Attending: NURSE PRACTITIONER
Payer: MEDICAID

## 2019-07-30 ENCOUNTER — OFFICE VISIT (OUTPATIENT)
Dept: OBSTETRICS AND GYNECOLOGY | Facility: CLINIC | Age: 24
End: 2019-07-30
Payer: MEDICAID

## 2019-07-30 VITALS
DIASTOLIC BLOOD PRESSURE: 82 MMHG | WEIGHT: 282.63 LBS | SYSTOLIC BLOOD PRESSURE: 128 MMHG | BODY MASS INDEX: 45.42 KG/M2 | HEIGHT: 66 IN

## 2019-07-30 DIAGNOSIS — Z71.1 CONCERN ABOUT STD IN FEMALE WITHOUT DIAGNOSIS: ICD-10-CM

## 2019-07-30 DIAGNOSIS — Z71.1 CONCERN ABOUT STD IN FEMALE WITHOUT DIAGNOSIS: Primary | ICD-10-CM

## 2019-07-30 DIAGNOSIS — R10.2 PELVIC PAIN IN FEMALE: ICD-10-CM

## 2019-07-30 PROCEDURE — 99999 PR PBB SHADOW E&M-EST. PATIENT-LVL III: ICD-10-PCS | Mod: PBBFAC,,, | Performed by: NURSE PRACTITIONER

## 2019-07-30 PROCEDURE — 86703 HIV-1/HIV-2 1 RESULT ANTBDY: CPT

## 2019-07-30 PROCEDURE — 99214 OFFICE O/P EST MOD 30 MIN: CPT | Mod: S$PBB,,, | Performed by: NURSE PRACTITIONER

## 2019-07-30 PROCEDURE — 99999 PR PBB SHADOW E&M-EST. PATIENT-LVL III: CPT | Mod: PBBFAC,,, | Performed by: NURSE PRACTITIONER

## 2019-07-30 PROCEDURE — 87491 CHLMYD TRACH DNA AMP PROBE: CPT | Mod: 59

## 2019-07-30 PROCEDURE — 99213 OFFICE O/P EST LOW 20 MIN: CPT | Mod: PBBFAC | Performed by: NURSE PRACTITIONER

## 2019-07-30 PROCEDURE — 99214 PR OFFICE/OUTPT VISIT, EST, LEVL IV, 30-39 MIN: ICD-10-PCS | Mod: S$PBB,,, | Performed by: NURSE PRACTITIONER

## 2019-07-30 PROCEDURE — 87481 CANDIDA DNA AMP PROBE: CPT | Mod: 59

## 2019-07-30 PROCEDURE — 86592 SYPHILIS TEST NON-TREP QUAL: CPT

## 2019-07-30 PROCEDURE — 87801 DETECT AGNT MULT DNA AMPLI: CPT

## 2019-07-30 PROCEDURE — 87086 URINE CULTURE/COLONY COUNT: CPT

## 2019-07-30 PROCEDURE — 36415 COLL VENOUS BLD VENIPUNCTURE: CPT

## 2019-07-30 NOTE — PROGRESS NOTES
"CC: STD assessment and pelvic pain    Juliet Jalloh is a 24 y.o. female  presents for STD assessment and pelvic pain.  LMP: Patient's last menstrual period was 2019..  No dysuria. Patient is concerned about STD exposure. Patient reports " mild pelvic pain for several weeks, on and off". " Nothing makes it better or worse". Cycles are every 26-28 days. UPT in clinic was negative. No birth control. Is sexually active.     Past Medical History:   Diagnosis Date    Abnormal Pap smear of cervix     Herpes simplex virus (HSV) infection     Pre-eclampsia in third trimester 2017     Past Surgical History:   Procedure Laterality Date    DELIVERY- SECTION N/A 2017    Performed by [facility]abb [facility] at [facility]D    MOUTH SURGERY       Social History     Socioeconomic History    Marital status: Single     Spouse name: Not on file    Number of children: Not on file    Years of education: Not on file    Highest education level: Not on file   Occupational History    Not on file   Social Needs    Financial resource strain: Not on file    Food insecurity:     Worry: Not on file     Inability: Not on file    Transportation needs:     Medical: Not on file     Non-medical: Not on file   Tobacco Use    Smoking status: Never Smoker    Smokeless tobacco: Never Used   Substance and Sexual Activity    Alcohol use: Yes     Frequency: Monthly or less     Drinks per session: 1 or 2     Binge frequency: Never     Comment: social use     Drug use: Yes     Types: Marijuana     Comment: 7 days     Sexual activity: Yes     Partners: Male     Birth control/protection: None   Lifestyle    Physical activity:     Days per week: Not on file     Minutes per session: Not on file    Stress: Not on file   Relationships    Social connections:     Talks on phone: Not on file     Gets together: Not on file     Attends Mandaeism service: Not on file     Active member of club or organization: Not on file " "    Attends meetings of clubs or organizations: Not on file     Relationship status: Not on file   Other Topics Concern    Not on file   Social History Narrative    Not on file     Family History   Problem Relation Age of Onset    Diabetes Maternal Grandmother     Hypertension Maternal Grandmother     Heart disease Maternal Grandmother     Colon cancer Maternal Grandfather     Cancer Neg Hx      OB History        1    Para   1    Term   1       0    AB   0    Living   1       SAB   0    TAB   0    Ectopic   0    Multiple   0    Live Births   1                 /82   Ht 5' 6" (1.676 m)   Wt 128.2 kg (282 lb 10.1 oz)   LMP 2019   BMI 45.62 kg/m²       ROS:  CARDIOVASCULAR: Denies palpitations or left sided chest pain.   ABDOMEN: HPI  URINARY: No frequency, dysuria, hematuria, or burning on urination.  REPRODUCTIVE: See HPI.       PHYSICAL EXAM:  APPEARANCE: Well nourished, well developed, in no acute distress.  AFFECT: WNL, alert and oriented x 3  ABDOMEN: Soft.  No tenderness or masses.  No hepatosplenomegaly.  No hernias.  PELVIC: Normal external genitalia without lesions. Vagina moist and well rugated without lesions or discharge.  Cervix pink, without lesions, discharge or tenderness.  Bimanual exam shows uterus to be normal size, regular, mobile and nontender.  Adnexa without masses or tenderness.      1. Concern about STD in female without diagnosis  HIV 1/2 Ag/Ab (4th Gen)    RPR    C. trachomatis/N. gonorrhoeae by AMP DNA    Vaginosis Screen by DNA Probe   2. Pelvic pain in female  US Pelvis Complete Non OB    Urine culture    PLAN:  Unremarkable exam  STD assessment  Urine cx  Pelvic ultrasound            "

## 2019-07-31 ENCOUNTER — TELEPHONE (OUTPATIENT)
Dept: RADIOLOGY | Facility: HOSPITAL | Age: 24
End: 2019-07-31

## 2019-07-31 LAB
BACTERIAL VAGINOSIS DNA: POSITIVE
C TRACH DNA SPEC QL NAA+PROBE: NOT DETECTED
CANDIDA GLABRATA DNA: NEGATIVE
CANDIDA KRUSEI DNA: NEGATIVE
CANDIDA RRNA VAG QL PROBE: NEGATIVE
HIV 1+2 AB+HIV1 P24 AG SERPL QL IA: NEGATIVE
N GONORRHOEA DNA SPEC QL NAA+PROBE: NOT DETECTED
RPR SER QL: NORMAL
T VAGINALIS RRNA GENITAL QL PROBE: NEGATIVE

## 2019-07-31 RX ORDER — METRONIDAZOLE 500 MG/1
500 TABLET ORAL 2 TIMES DAILY
Qty: 14 TABLET | Refills: 0 | Status: SHIPPED | OUTPATIENT
Start: 2019-07-31 | End: 2019-08-07

## 2019-07-31 RX ORDER — VALACYCLOVIR HYDROCHLORIDE 500 MG/1
500 TABLET, FILM COATED ORAL DAILY
Qty: 30 TABLET | Refills: 5 | Status: SHIPPED | OUTPATIENT
Start: 2019-07-31 | End: 2019-09-11 | Stop reason: CLARIF

## 2019-08-01 ENCOUNTER — HOSPITAL ENCOUNTER (OUTPATIENT)
Dept: RADIOLOGY | Facility: HOSPITAL | Age: 24
Discharge: HOME | End: 2019-08-01
Attending: NURSE PRACTITIONER
Payer: MEDICAID

## 2019-08-01 DIAGNOSIS — R10.2 PELVIC PAIN IN FEMALE: ICD-10-CM

## 2019-08-01 LAB
BACTERIA UR CULT: NORMAL
BACTERIA UR CULT: NORMAL

## 2019-08-04 ENCOUNTER — HOSPITAL ENCOUNTER (EMERGENCY)
Facility: HOSPITAL | Age: 24
Discharge: HOME | End: 2019-08-04
Attending: EMERGENCY MEDICINE
Payer: MEDICAID

## 2019-08-04 VITALS
HEIGHT: 66 IN | WEIGHT: 280.56 LBS | OXYGEN SATURATION: 98 % | SYSTOLIC BLOOD PRESSURE: 134 MMHG | DIASTOLIC BLOOD PRESSURE: 84 MMHG | TEMPERATURE: 99 F | HEART RATE: 103 BPM | BODY MASS INDEX: 45.09 KG/M2 | RESPIRATION RATE: 20 BRPM

## 2019-08-04 DIAGNOSIS — N39.0 LOWER URINARY TRACT INFECTION: Primary | ICD-10-CM

## 2019-08-04 DIAGNOSIS — R10.2 PELVIC PAIN: ICD-10-CM

## 2019-08-04 LAB
B-HCG UR QL: NEGATIVE
BACTERIA #/AREA URNS HPF: ABNORMAL /HPF
BILIRUB UR QL STRIP: NEGATIVE
CLARITY UR: CLEAR
COLOR UR: YELLOW
GLUCOSE UR QL STRIP: NEGATIVE
HGB UR QL STRIP: ABNORMAL
KETONES UR QL STRIP: NEGATIVE
LEUKOCYTE ESTERASE UR QL STRIP: NEGATIVE
MICROSCOPIC COMMENT: ABNORMAL
NITRITE UR QL STRIP: POSITIVE
PH UR STRIP: 6 [PH] (ref 5–8)
PROT UR QL STRIP: ABNORMAL
RBC #/AREA URNS HPF: 2 /HPF (ref 0–4)
SP GR UR STRIP: >=1.03 (ref 1–1.03)
URN SPEC COLLECT METH UR: ABNORMAL
UROBILINOGEN UR STRIP-ACNC: NEGATIVE EU/DL
WBC #/AREA URNS HPF: 3 /HPF (ref 0–5)

## 2019-08-04 PROCEDURE — 81000 URINALYSIS NONAUTO W/SCOPE: CPT

## 2019-08-04 PROCEDURE — 99284 EMERGENCY DEPT VISIT MOD MDM: CPT

## 2019-08-04 PROCEDURE — 81025 URINE PREGNANCY TEST: CPT

## 2019-08-04 RX ORDER — NITROFURANTOIN 25; 75 MG/1; MG/1
100 CAPSULE ORAL 2 TIMES DAILY
Qty: 10 CAPSULE | Refills: 0 | Status: SHIPPED | OUTPATIENT
Start: 2019-08-04 | End: 2019-08-09

## 2019-08-04 RX ORDER — TRAMADOL HYDROCHLORIDE 50 MG/1
50 TABLET ORAL EVERY 6 HOURS PRN
Qty: 12 TABLET | Refills: 0 | Status: SHIPPED | OUTPATIENT
Start: 2019-08-04 | End: 2019-08-28

## 2019-08-07 ENCOUNTER — TELEPHONE (OUTPATIENT)
Dept: RADIOLOGY | Facility: HOSPITAL | Age: 24
End: 2019-08-07

## 2019-08-08 ENCOUNTER — HOSPITAL ENCOUNTER (OUTPATIENT)
Dept: RADIOLOGY | Facility: HOSPITAL | Age: 24
Discharge: HOME | End: 2019-08-08
Attending: NURSE PRACTITIONER
Payer: MEDICAID

## 2019-08-08 PROCEDURE — 76830 TRANSVAGINAL US NON-OB: CPT | Mod: TC

## 2019-08-08 PROCEDURE — 76830 US PELVIS COMP WITH TRANSVAG NON-OB (XPD): ICD-10-PCS | Mod: 26,,, | Performed by: RADIOLOGY

## 2019-08-08 PROCEDURE — 76856 US PELVIS COMP WITH TRANSVAG NON-OB (XPD): ICD-10-PCS | Mod: 26,,, | Performed by: RADIOLOGY

## 2019-08-08 PROCEDURE — 76856 US EXAM PELVIC COMPLETE: CPT | Mod: 26,,, | Performed by: RADIOLOGY

## 2019-08-08 PROCEDURE — 76830 TRANSVAGINAL US NON-OB: CPT | Mod: 26,,, | Performed by: RADIOLOGY

## 2019-08-08 NOTE — PROGRESS NOTES
Please call patient and inform her that pelvic ultrasound was normal. She proceed to the ED after our visit, she pls have her see a MD for follow-up DX: Levick pain with negative work-up

## 2019-08-13 ENCOUNTER — TELEPHONE (OUTPATIENT)
Dept: OBSTETRICS AND GYNECOLOGY | Facility: CLINIC | Age: 24
End: 2019-08-13

## 2019-08-19 ENCOUNTER — PATIENT MESSAGE (OUTPATIENT)
Dept: OBSTETRICS AND GYNECOLOGY | Facility: CLINIC | Age: 24
End: 2019-08-19

## 2019-08-19 ENCOUNTER — TELEPHONE (OUTPATIENT)
Dept: OBSTETRICS AND GYNECOLOGY | Facility: CLINIC | Age: 24
End: 2019-08-19

## 2019-08-19 NOTE — TELEPHONE ENCOUNTER
----- Message from Cristy Gooden NP sent at 8/17/2019 12:35 PM CDT -----  On sat clinic for pregnancy, needs to see CNM

## 2019-08-28 ENCOUNTER — TELEPHONE (OUTPATIENT)
Dept: OBSTETRICS AND GYNECOLOGY | Facility: CLINIC | Age: 24
End: 2019-08-28

## 2019-08-28 ENCOUNTER — OFFICE VISIT (OUTPATIENT)
Dept: OBSTETRICS AND GYNECOLOGY | Facility: CLINIC | Age: 24
End: 2019-08-28
Payer: MEDICAID

## 2019-08-28 VITALS
SYSTOLIC BLOOD PRESSURE: 110 MMHG | DIASTOLIC BLOOD PRESSURE: 66 MMHG | HEIGHT: 66 IN | WEIGHT: 284.63 LBS | BODY MASS INDEX: 45.74 KG/M2

## 2019-08-28 DIAGNOSIS — Z32.00 VISIT FOR CONFIRMATION OF PREGNANCY TEST RESULT WITH PHYSICAL EXAM: Primary | ICD-10-CM

## 2019-08-28 DIAGNOSIS — O09.291 HISTORY OF PRE-ECLAMPSIA IN PRIOR PREGNANCY, CURRENTLY PREGNANT IN FIRST TRIMESTER: ICD-10-CM

## 2019-08-28 DIAGNOSIS — Z98.891 HISTORY OF C-SECTION: ICD-10-CM

## 2019-08-28 DIAGNOSIS — E66.01 MORBID OBESITY WITH BMI OF 45.0-49.9, ADULT: ICD-10-CM

## 2019-08-28 LAB
CREAT UR-MCNC: 229 MG/DL (ref 15–325)
PROT UR-MCNC: 9 MG/DL (ref 0–15)
PROT/CREAT UR: 0.04 MG/G{CREAT} (ref 0–0.2)

## 2019-08-28 PROCEDURE — 82570 ASSAY OF URINE CREATININE: CPT

## 2019-08-28 PROCEDURE — 99999 PR PBB SHADOW E&M-EST. PATIENT-LVL III: CPT | Mod: PBBFAC,,, | Performed by: OBSTETRICS & GYNECOLOGY

## 2019-08-28 PROCEDURE — 99214 OFFICE O/P EST MOD 30 MIN: CPT | Mod: S$PBB,TH,, | Performed by: OBSTETRICS & GYNECOLOGY

## 2019-08-28 PROCEDURE — 99213 OFFICE O/P EST LOW 20 MIN: CPT | Mod: PBBFAC,TH | Performed by: OBSTETRICS & GYNECOLOGY

## 2019-08-28 PROCEDURE — 99999 PR PBB SHADOW E&M-EST. PATIENT-LVL III: ICD-10-PCS | Mod: PBBFAC,,, | Performed by: OBSTETRICS & GYNECOLOGY

## 2019-08-28 PROCEDURE — 99214 PR OFFICE/OUTPT VISIT, EST, LEVL IV, 30-39 MIN: ICD-10-PCS | Mod: S$PBB,TH,, | Performed by: OBSTETRICS & GYNECOLOGY

## 2019-08-28 NOTE — TELEPHONE ENCOUNTER
----- Message from Wendy Bejarano LPN sent at 8/28/2019  4:18 PM CDT -----  9/11 dr. sow at [address] [place] [address]

## 2019-08-28 NOTE — PROGRESS NOTES
Subjective:       Patient ID: Juliet Jalloh is a 24 y.o. female.    Chief Complaint:  Possible Pregnancy      History of Present Illness  HPI  23 yo  presents for pregnancy confirmation. Had preE last pregnancy (2017) requiring induction at 36 wks; failure to progress w/ cervical edema w/ subsequent LTCS. Pt lost 50lbs last pregnancy due to n/v & ptyalism last pregnancy. Had normal u/s  & neg UPT 19. approx 5w4d based on LMP    Past Medical History:   Diagnosis Date    Abnormal Pap smear of cervix     Herpes simplex virus (HSV) infection     Pre-eclampsia in third trimester 2017       Past Surgical History:   Procedure Laterality Date    DELIVERY- SECTION N/A 2017    [facility]D    MOUTH SURGERY         Family History   Problem Relation Age of Onset    Diabetes Maternal Grandmother     Hypertension Maternal Grandmother     Heart disease Maternal Grandmother     Colon cancer Maternal Grandfather     Cancer Neg Hx        Social History     Socioeconomic History    Marital status: Single     Spouse name: Not on file    Number of children: Not on file    Years of education: Not on file    Highest education level: Not on file   Occupational History    Not on file   Social Needs    Financial resource strain: Not on file    Food insecurity:     Worry: Not on file     Inability: Not on file    Transportation needs:     Medical: Not on file     Non-medical: Not on file   Tobacco Use    Smoking status: Never Smoker    Smokeless tobacco: Never Used   Substance and Sexual Activity    Alcohol use: Yes     Frequency: Monthly or less     Drinks per session: 1 or 2     Binge frequency: Never     Comment: social use     Drug use: Yes     Types: Marijuana     Comment: 7 days     Sexual activity: Yes     Partners: Male     Birth control/protection: None   Lifestyle    Physical activity:     Days per week: Not on file     Minutes per  session: Not on file    Stress: Not on file   Relationships    Social connections:     Talks on phone: Not on file     Gets together: Not on file     Attends Mosque service: Not on file     Active member of club or organization: Not on file     Attends meetings of clubs or organizations: Not on file     Relationship status: Not on file   Other Topics Concern    Not on file   Social History Narrative    Not on file       Current Outpatient Medications   Medication Sig Dispense Refill    valACYclovir (VALTREX) 500 MG tablet Take 1 tablet (500 mg total) by mouth once daily. 30 tablet 5     No current facility-administered medications for this visit.        Review of patient's allergies indicates:  No Known Allergies      GYN & OB History  Patient's last menstrual period was 2019 (exact date).   Date of Last Pap: 3/8/2018 normal; h/o HSV 2, trich    OB History    Para Term  AB Living   1 1 1 0 0 1   SAB TAB Ectopic Multiple Live Births   0 0 0 0 1      # Outcome Date GA Lbr Goldy/2nd Weight Sex Delivery Anes PTL Lv   1 Term 17 37w1d  2.835 kg (6 lb 4 oz) M CS-LTranv Gen N BETTINA      Complications: Failure to Progress in First Stage       Review of Systems  Review of Systems   All other systems reviewed and are negative.      Objective:     Physical Exam   Constitutional: She is oriented to person, place, and time. She appears well-developed and well-nourished.   Pulmonary/Chest: Effort normal.   Abdominal: Soft. She exhibits no distension. There is no tenderness. There is no guarding.   Genitourinary: Vagina normal and uterus normal. No vaginal discharge found.   Genitourinary Comments: Normal external genitalia/cervix/vaginal mucosa. Normal bimanual exam   Musculoskeletal: Normal range of motion.   Neurological: She is alert and oriented to person, place, and time.   Skin: Skin is warm and dry.   Psychiatric: She has a normal mood and affect. Her behavior is normal.      UPT (+)      Assessment:        1. Visit for confirmation of pregnancy test result with physical exam    2. History of pre-eclampsia in prior pregnancy, currently pregnant in first trimester    3. Morbid obesity with BMI of 45.0-49.9, adult    4. History of          Plan:      1. rec early seda & baseline PCR/CMP  2. Ob labs  3. Puneet/chlam neg < 1month ago-not repeated today  4.  vs repeat csection r/b/a   5. Valtrex suppression 35-36 w

## 2019-09-09 ENCOUNTER — PATIENT MESSAGE (OUTPATIENT)
Dept: OBSTETRICS AND GYNECOLOGY | Facility: CLINIC | Age: 24
End: 2019-09-09

## 2019-09-09 DIAGNOSIS — R30.0 DYSURIA DURING PREGNANCY IN FIRST TRIMESTER: Primary | ICD-10-CM

## 2019-09-09 DIAGNOSIS — O26.891 DYSURIA DURING PREGNANCY IN FIRST TRIMESTER: Primary | ICD-10-CM

## 2019-09-11 ENCOUNTER — HOSPITAL ENCOUNTER (EMERGENCY)
Facility: HOSPITAL | Age: 24
Discharge: HOME | End: 2019-09-11
Attending: EMERGENCY MEDICINE
Payer: MEDICAID

## 2019-09-11 ENCOUNTER — INITIAL PRENATAL (OUTPATIENT)
Dept: OBSTETRICS AND GYNECOLOGY | Facility: CLINIC | Age: 24
End: 2019-09-11
Payer: MEDICAID

## 2019-09-11 ENCOUNTER — LAB VISIT (OUTPATIENT)
Dept: LAB | Facility: HOSPITAL | Age: 24
End: 2019-09-11
Attending: OBSTETRICS & GYNECOLOGY
Payer: MEDICAID

## 2019-09-11 VITALS
BODY MASS INDEX: 43.38 KG/M2 | SYSTOLIC BLOOD PRESSURE: 112 MMHG | DIASTOLIC BLOOD PRESSURE: 76 MMHG | WEIGHT: 268.75 LBS

## 2019-09-11 VITALS
HEIGHT: 66 IN | SYSTOLIC BLOOD PRESSURE: 118 MMHG | DIASTOLIC BLOOD PRESSURE: 76 MMHG | OXYGEN SATURATION: 100 % | WEIGHT: 268 LBS | BODY MASS INDEX: 43.07 KG/M2 | RESPIRATION RATE: 18 BRPM | TEMPERATURE: 98 F | HEART RATE: 87 BPM

## 2019-09-11 DIAGNOSIS — O21.0 HYPEREMESIS GRAVIDARUM: Primary | ICD-10-CM

## 2019-09-11 DIAGNOSIS — O09.291 HISTORY OF PRE-ECLAMPSIA IN PRIOR PREGNANCY, CURRENTLY PREGNANT IN FIRST TRIMESTER: ICD-10-CM

## 2019-09-11 DIAGNOSIS — Z32.00 VISIT FOR CONFIRMATION OF PREGNANCY TEST RESULT WITH PHYSICAL EXAM: ICD-10-CM

## 2019-09-11 DIAGNOSIS — Z34.90 PREGNANCY, UNSPECIFIED GESTATIONAL AGE: ICD-10-CM

## 2019-09-11 DIAGNOSIS — O21.0 HYPEREMESIS COMPLICATING PREGNANCY, ANTEPARTUM: Primary | ICD-10-CM

## 2019-09-11 DIAGNOSIS — E66.01 MORBID OBESITY WITH BMI OF 45.0-49.9, ADULT: ICD-10-CM

## 2019-09-11 DIAGNOSIS — R63.4 WEIGHT LOSS: ICD-10-CM

## 2019-09-11 LAB
ALBUMIN SERPL BCP-MCNC: 4.1 G/DL (ref 3.5–5.2)
ALP SERPL-CCNC: 113 U/L (ref 55–135)
ALT SERPL W/O P-5'-P-CCNC: 31 U/L (ref 10–44)
ANION GAP SERPL CALC-SCNC: 13 MMOL/L (ref 8–16)
AST SERPL-CCNC: 24 U/L (ref 10–40)
BASOPHILS # BLD AUTO: 0.01 K/UL (ref 0–0.2)
BASOPHILS NFR BLD: 0.1 % (ref 0–1.9)
BILIRUB SERPL-MCNC: 0.4 MG/DL (ref 0.1–1)
BUN SERPL-MCNC: 8 MG/DL (ref 6–20)
CALCIUM SERPL-MCNC: 9.8 MG/DL (ref 8.7–10.5)
CHLORIDE SERPL-SCNC: 103 MMOL/L (ref 95–110)
CO2 SERPL-SCNC: 21 MMOL/L (ref 23–29)
CREAT SERPL-MCNC: 0.8 MG/DL (ref 0.5–1.4)
DIFFERENTIAL METHOD: ABNORMAL
EOSINOPHIL # BLD AUTO: 0.1 K/UL (ref 0–0.5)
EOSINOPHIL NFR BLD: 0.6 % (ref 0–8)
ERYTHROCYTE [DISTWIDTH] IN BLOOD BY AUTOMATED COUNT: 13.9 % (ref 11.5–14.5)
EST. GFR  (AFRICAN AMERICAN): >60 ML/MIN/1.73 M^2
EST. GFR  (NON AFRICAN AMERICAN): >60 ML/MIN/1.73 M^2
GLUCOSE SERPL-MCNC: 91 MG/DL (ref 70–110)
HCT VFR BLD AUTO: 34.4 % (ref 37–48.5)
HGB BLD-MCNC: 11.3 G/DL (ref 12–16)
LYMPHOCYTES # BLD AUTO: 2 K/UL (ref 1–4.8)
LYMPHOCYTES NFR BLD: 23.3 % (ref 18–48)
MCH RBC QN AUTO: 27.3 PG (ref 27–31)
MCHC RBC AUTO-ENTMCNC: 32.8 G/DL (ref 32–36)
MCV RBC AUTO: 83 FL (ref 82–98)
MONOCYTES # BLD AUTO: 0.4 K/UL (ref 0.3–1)
MONOCYTES NFR BLD: 4.6 % (ref 4–15)
NEUTROPHILS # BLD AUTO: 6.1 K/UL (ref 1.8–7.7)
NEUTROPHILS NFR BLD: 71.6 % (ref 38–73)
PLATELET # BLD AUTO: 366 K/UL (ref 150–350)
PMV BLD AUTO: 9.5 FL (ref 9.2–12.9)
POTASSIUM SERPL-SCNC: 4.1 MMOL/L (ref 3.5–5.1)
PROT SERPL-MCNC: 8.8 G/DL (ref 6–8.4)
RBC # BLD AUTO: 4.14 M/UL (ref 4–5.4)
SODIUM SERPL-SCNC: 137 MMOL/L (ref 136–145)
WBC # BLD AUTO: 8.51 K/UL (ref 3.9–12.7)

## 2019-09-11 PROCEDURE — 99499 UNLISTED E&M SERVICE: CPT | Mod: ,,, | Performed by: OBSTETRICS & GYNECOLOGY

## 2019-09-11 PROCEDURE — 63600175 PHARM REV CODE 636 W HCPCS: Performed by: PHYSICIAN ASSISTANT

## 2019-09-11 PROCEDURE — 99212 OFFICE O/P EST SF 10 MIN: CPT | Mod: PBBFAC,25,27,TH | Performed by: OBSTETRICS & GYNECOLOGY

## 2019-09-11 PROCEDURE — 80053 COMPREHEN METABOLIC PANEL: CPT

## 2019-09-11 PROCEDURE — 99284 EMERGENCY DEPT VISIT MOD MDM: CPT | Mod: 25

## 2019-09-11 PROCEDURE — 99999 PR PBB SHADOW E&M-EST. PATIENT-LVL II: ICD-10-PCS | Mod: PBBFAC,,, | Performed by: OBSTETRICS & GYNECOLOGY

## 2019-09-11 PROCEDURE — 99499 NO LOS: ICD-10-PCS | Mod: ,,, | Performed by: OBSTETRICS & GYNECOLOGY

## 2019-09-11 PROCEDURE — 96365 THER/PROPH/DIAG IV INF INIT: CPT

## 2019-09-11 PROCEDURE — 85025 COMPLETE CBC W/AUTO DIFF WBC: CPT

## 2019-09-11 PROCEDURE — 99999 PR PBB SHADOW E&M-EST. PATIENT-LVL II: CPT | Mod: PBBFAC,,, | Performed by: OBSTETRICS & GYNECOLOGY

## 2019-09-11 RX ORDER — ONDANSETRON 4 MG/1
TABLET, ORALLY DISINTEGRATING ORAL
Refills: 0 | COMMUNITY
Start: 2019-09-08 | End: 2019-09-11 | Stop reason: CLARIF

## 2019-09-11 RX ORDER — ONDANSETRON 4 MG/1
8 TABLET, FILM COATED ORAL 2 TIMES DAILY
COMMUNITY
End: 2019-10-09 | Stop reason: SDUPTHER

## 2019-09-11 RX ORDER — PROMETHAZINE HYDROCHLORIDE 12.5 MG/1
SUPPOSITORY RECTAL
Refills: 0 | COMMUNITY
Start: 2019-09-06 | End: 2019-10-09 | Stop reason: ALTCHOICE

## 2019-09-11 RX ADMIN — SODIUM CHLORIDE 1000 ML: 0.9 INJECTION, SOLUTION INTRAVENOUS at 02:09

## 2019-09-11 RX ADMIN — PROMETHAZINE HYDROCHLORIDE 12.5 MG: 25 INJECTION INTRAMUSCULAR; INTRAVENOUS at 02:09

## 2019-09-11 NOTE — ED PROVIDER NOTES
History      Chief Complaint   Patient presents with    Emesis During Pregnancy     Sent from OB for vomiting- pt reports loss of approx. 16 lbs over last couple of weeks       Review of patient's allergies indicates:  No Known Allergies     HPI   HPI    2019, 2:08 PM   History obtained from the patient      History of Present Illness: Juliet Jalloh is a 24 y.o. female patient who presents to the Emergency Department for vomiting x 2 weeks.  Patient states that she has vomited today.   Patient reports morning sickness due to pregnancy.  Patient denies diarrhea.  Patient states that she drank water and attempted to drink glucose to complete glucose testing.  Patient has also drank rootbeer today.   Denies fever, chest pain, SOB, headache, dizziness.  Patient states that she experienced weight loss during first pregnancy due to excessive morning sickness.  Treatments tried include Phenergan and Zofran.       Arrival mode: Personal vehicle      PCP: Primary Doctor No       Past Medical History:  Past Medical History:   Diagnosis Date    Abnormal Pap smear of cervix     Herpes simplex virus (HSV) infection     Pre-eclampsia in third trimester 2017       Past Surgical History:  Past Surgical History:   Procedure Laterality Date     SECTION      DELIVERY- SECTION N/A 2017    Performed by Guillaume [facility]abb MD at [facility] L[facility]D    MOUTH SURGERY           Family History:  Family History   Problem Relation Age of Onset    Diabetes Maternal Grandmother     Hypertension Maternal Grandmother     Heart disease Maternal Grandmother     Colon cancer Maternal Grandfather     Cancer Neg Hx        Social History:  Social History     Tobacco Use    Smoking status: Never Smoker    Smokeless tobacco: Never Used   Substance and Sexual Activity    Alcohol use: Not Currently     Frequency: Monthly or less     Drinks per session: 1 or 2     Binge frequency: Never     Comment: social use      Drug use: Yes     Types: Marijuana     Comment: Last used in August 2019    Sexual activity: Yes     Partners: Male     Birth control/protection: None       ROS   Review of Systems   Constitutional: Negative for chills and fever.   HENT: Negative for congestion and postnasal drip.    Eyes: Negative for discharge and redness.   Respiratory: Negative for cough and wheezing.    Cardiovascular: Negative for chest pain and palpitations.   Gastrointestinal: Positive for vomiting. Negative for diarrhea and nausea.   Genitourinary: Negative for dysuria and frequency.   Musculoskeletal: Negative for arthralgias and neck pain.   Skin: Negative for rash and wound.   Neurological: Negative for dizziness and headaches.       Physical Exam      Initial Vitals [09/11/19 1345]   BP Pulse Resp Temp SpO2   119/78 85 18 98.3 °F (36.8 °C) 98 %      MAP       --          Physical Exam  Nursing Notes and Vital Signs Reviewed.  Constitutional: Patient is in no apparent distress. Awake and alert. Well-developed and well-nourished.  Head: Atraumatic. Normocephalic.  Eyes: PERRL. EOM intact. Conjunctivae are not pale. No scleral icterus.  ENT: Mucous membranes are moist. Oropharynx is clear and symmetric.    Neck: Supple. Full ROM. No lymphadenopathy.  Cardiovascular: Regular rate. Regular rhythm. No murmurs, rubs, or gallops. Distal pulses are 2+ and symmetric.  Pulmonary/Chest: No respiratory distress. Clear to auscultation bilaterally. No wheezing, rales, or rhonchi.  Abdominal: Soft and non-distended.  There is no tenderness.  No rebound, guarding, or rigidity. Good bowel sounds.  Genitourinary: No CVA tenderness  Musculoskeletal: Moves all extremities. No obvious deformities. No edema. No calf tenderness.  Skin: Warm and dry.  Neurological:  Alert, awake, and appropriate.  Normal speech.  No acute focal neurological deficits are appreciated.  Psychiatric: Normal affect. Good eye contact. Appropriate in content.    ED Course   "  Procedures  ED Vital Signs:  Vitals:    09/11/19 1345 09/11/19 1544   BP: 119/78 118/76   Pulse: 85 87   Resp: 18 18   Temp: 98.3 °F (36.8 °C) 98.4 °F (36.9 °C)   TempSrc:  Oral   SpO2: 98% 100%   Weight: 121.6 kg (268 lb)    Height: 5' 6" (1.676 m)        Abnormal Lab Results:  Labs Reviewed   CBC W/ AUTO DIFFERENTIAL - Abnormal; Notable for the following components:       Result Value    Hemoglobin 11.3 (*)     Hematocrit 34.4 (*)     Platelets 366 (*)     All other components within normal limits   COMPREHENSIVE METABOLIC PANEL - Abnormal; Notable for the following components:    CO2 21 (*)     Total Protein 8.8 (*)     All other components within normal limits        All Lab Results:  Results for orders placed or performed during the hospital encounter of 09/11/19   CBC auto differential   Result Value Ref Range    WBC 8.51 3.90 - 12.70 K/uL    RBC 4.14 4.00 - 5.40 M/uL    Hemoglobin 11.3 (L) 12.0 - 16.0 g/dL    Hematocrit 34.4 (L) 37.0 - 48.5 %    Mean Corpuscular Volume 83 82 - 98 fL    Mean Corpuscular Hemoglobin 27.3 27.0 - 31.0 pg    Mean Corpuscular Hemoglobin Conc 32.8 32.0 - 36.0 g/dL    RDW 13.9 11.5 - 14.5 %    Platelets 366 (H) 150 - 350 K/uL    MPV 9.5 9.2 - 12.9 fL    Gran # (ANC) 6.1 1.8 - 7.7 K/uL    Lymph # 2.0 1.0 - 4.8 K/uL    Mono # 0.4 0.3 - 1.0 K/uL    Eos # 0.1 0.0 - 0.5 K/uL    Baso # 0.01 0.00 - 0.20 K/uL    Gran% 71.6 38.0 - 73.0 %    Lymph% 23.3 18.0 - 48.0 %    Mono% 4.6 4.0 - 15.0 %    Eosinophil% 0.6 0.0 - 8.0 %    Basophil% 0.1 0.0 - 1.9 %    Differential Method Automated    Comprehensive metabolic panel   Result Value Ref Range    Sodium 137 136 - 145 mmol/L    Potassium 4.1 3.5 - 5.1 mmol/L    Chloride 103 95 - 110 mmol/L    CO2 21 (L) 23 - 29 mmol/L    Glucose 91 70 - 110 mg/dL    BUN, Bld 8 6 - 20 mg/dL    Creatinine 0.8 0.5 - 1.4 mg/dL    Calcium 9.8 8.7 - 10.5 mg/dL    Total Protein 8.8 (H) 6.0 - 8.4 g/dL    Albumin 4.1 3.5 - 5.2 g/dL    Total Bilirubin 0.4 0.1 - 1.0 mg/dL "    Alkaline Phosphatase 113 55 - 135 U/L    AST 24 10 - 40 U/L    ALT 31 10 - 44 U/L    Anion Gap 13 8 - 16 mmol/L    eGFR if African American >60 >60 mL/min/1.73 m^2    eGFR if non African American >60 >60 mL/min/1.73 m^2         Imaging Results:  Imaging Results    None                 The Emergency Provider reviewed the vital signs and test results, which are outlined above.    ED Discussion     3:28 PM: Reassessed pt at this time.  Pt states her condition has improved at this time. Discussed with pt all pertinent ED information and results. Discussed pt dx and plan of tx. Gave pt all f/u and return to the ED instructions. All questions and concerns were addressed at this time. Pt expresses understanding of information and instructions, and is comfortable with plan to discharge. Pt is stable for discharge.    I discussed with patient and/or family/caretaker that evaluation in the ED does not suggest any emergent or life threatening medical conditions requiring immediate intervention beyond what was provided in the ED, and I believe patient is safe for discharge.  Regardless, an unremarkable evaluation in the ED does not preclude the development or presence of a serious of life threatening condition. As such, patient was instructed to return immediately for any worsening or change in current symptoms.        ED Medication(s):  Medications   sodium chloride 0.9% bolus 1,000 mL (0 mLs Intravenous Stopped 9/11/19 1544)   promethazine (PHENERGAN) 12.5 mg in dextrose 5 % 50 mL IVPB (0 mg Intravenous Stopped 9/11/19 1515)       Discharge Medication List as of 9/11/2019  3:28 PM          Follow-up Information     Mary Vazquez MD. Schedule an appointment as soon as possible for a visit in 3 days.    Specialties:  Obstetrics, Obstetrics and Gynecology  Contact information:  [address]  [place] [address] [phone]                     Medical Decision Making                  Clinical Impression        ICD-10-CM ICD-9-CM   1. Hyperemesis gravidarum O21.0 643.00   2. Pregnancy, unspecified gestational age Z34.90 V22.2       Disposition:   Disposition: Discharged  Condition: Stable           Maira Rose PA-C  09/11/19 1658

## 2019-09-12 DIAGNOSIS — Z34.90 EARLY STAGE OF PREGNANCY: ICD-10-CM

## 2019-09-12 DIAGNOSIS — O21.0 HYPEREMESIS COMPLICATING PREGNANCY, ANTEPARTUM: Primary | ICD-10-CM

## 2019-09-13 ENCOUNTER — TELEPHONE (OUTPATIENT)
Dept: OBSTETRICS AND GYNECOLOGY | Facility: CLINIC | Age: 24
End: 2019-09-13

## 2019-09-13 NOTE — TELEPHONE ENCOUNTER
Patient has lost a significant amount of weight with her current pregnancy.  Dr. Vazquez sent the patient to the hospital on Wednesday. Patient was not kept long, and was discharged.  Patient is still very sick, and not happy with the service she received.  Informed patient that I would consult with the supervisor, and someone will give her a call back with verbal understanding.

## 2019-09-13 NOTE — TELEPHONE ENCOUNTER
----- Message from Andre Mora sent at 9/13/2019  1:35 PM CDT -----  Contact: self [phone]  Pt would like to follow-up with nurse regarding recent ER visit.    Please call back at [phone].  Md Jim

## 2019-09-13 NOTE — TELEPHONE ENCOUNTER
Spoke with patient about recent ER visit. Pt is NOT happy with the care she received. Pt stated that she has lost 16lbs since finding out she was pregnant and unable to hold down any pills (such as zofran/phen) Pt went to ER and did not get a full bag of fluids before being discharged home. I apologized for the service she received and assured her that we would escalate that encounter. Patient does have zofran suppository that she hasn't tried yet, I encouraged her to try those since she is unable to hold down the pills long enough to take effect and take small sips of water or gatorade. If she starts to feel weak or dizzy or is urinating minimally then to return to ER for evaluation and fluids. I will update her on the progress of her complaint about the ER. Pt verbalized understanding.

## 2019-09-14 NOTE — PROGRESS NOTES
Pt seen 9/11 w/ c/o hyperemesis. Noted to have weight loss 16lbs. Reports zofran not working & does not use phenergan suppositories much. Discussed inpatient management. Sent to ER. Was 1 hr late for u/s. Will have to do inpatient

## 2019-09-17 ENCOUNTER — HOSPITAL ENCOUNTER (EMERGENCY)
Facility: HOSPITAL | Age: 24
Discharge: HOME | End: 2019-09-17
Attending: EMERGENCY MEDICINE
Payer: MEDICAID

## 2019-09-17 VITALS
WEIGHT: 261.13 LBS | OXYGEN SATURATION: 100 % | BODY MASS INDEX: 41.97 KG/M2 | DIASTOLIC BLOOD PRESSURE: 80 MMHG | HEART RATE: 90 BPM | HEIGHT: 66 IN | TEMPERATURE: 98 F | RESPIRATION RATE: 16 BRPM | SYSTOLIC BLOOD PRESSURE: 128 MMHG

## 2019-09-17 DIAGNOSIS — R11.10 VOMITING, INTRACTABILITY OF VOMITING NOT SPECIFIED, PRESENCE OF NAUSEA NOT SPECIFIED, UNSPECIFIED VOMITING TYPE: Primary | ICD-10-CM

## 2019-09-17 LAB
ALBUMIN SERPL BCP-MCNC: 4.2 G/DL (ref 3.5–5.2)
ALP SERPL-CCNC: 127 U/L (ref 55–135)
ALT SERPL W/O P-5'-P-CCNC: 113 U/L (ref 10–44)
ANION GAP SERPL CALC-SCNC: 11 MMOL/L (ref 8–16)
AST SERPL-CCNC: 57 U/L (ref 10–40)
BASOPHILS # BLD AUTO: 0.02 K/UL (ref 0–0.2)
BASOPHILS NFR BLD: 0.2 % (ref 0–1.9)
BILIRUB SERPL-MCNC: 0.5 MG/DL (ref 0.1–1)
BUN SERPL-MCNC: 7 MG/DL (ref 6–20)
CALCIUM SERPL-MCNC: 9.9 MG/DL (ref 8.7–10.5)
CHLORIDE SERPL-SCNC: 100 MMOL/L (ref 95–110)
CO2 SERPL-SCNC: 27 MMOL/L (ref 23–29)
CREAT SERPL-MCNC: 0.7 MG/DL (ref 0.5–1.4)
DIFFERENTIAL METHOD: ABNORMAL
EOSINOPHIL # BLD AUTO: 0.1 K/UL (ref 0–0.5)
EOSINOPHIL NFR BLD: 0.5 % (ref 0–8)
ERYTHROCYTE [DISTWIDTH] IN BLOOD BY AUTOMATED COUNT: 13.8 % (ref 11.5–14.5)
EST. GFR  (AFRICAN AMERICAN): >60 ML/MIN/1.73 M^2
EST. GFR  (NON AFRICAN AMERICAN): >60 ML/MIN/1.73 M^2
GLUCOSE SERPL-MCNC: 94 MG/DL (ref 70–110)
HCT VFR BLD AUTO: 35.7 % (ref 37–48.5)
HGB BLD-MCNC: 11.8 G/DL (ref 12–16)
LYMPHOCYTES # BLD AUTO: 2.6 K/UL (ref 1–4.8)
LYMPHOCYTES NFR BLD: 24.2 % (ref 18–48)
MCH RBC QN AUTO: 27.4 PG (ref 27–31)
MCHC RBC AUTO-ENTMCNC: 33.1 G/DL (ref 32–36)
MCV RBC AUTO: 83 FL (ref 82–98)
MONOCYTES # BLD AUTO: 0.6 K/UL (ref 0.3–1)
MONOCYTES NFR BLD: 5.5 % (ref 4–15)
NEUTROPHILS # BLD AUTO: 7.4 K/UL (ref 1.8–7.7)
NEUTROPHILS NFR BLD: 69.8 % (ref 38–73)
PLATELET # BLD AUTO: 370 K/UL (ref 150–350)
PMV BLD AUTO: 9.4 FL (ref 9.2–12.9)
POTASSIUM SERPL-SCNC: 3.4 MMOL/L (ref 3.5–5.1)
PROT SERPL-MCNC: 8.9 G/DL (ref 6–8.4)
RBC # BLD AUTO: 4.3 M/UL (ref 4–5.4)
SODIUM SERPL-SCNC: 138 MMOL/L (ref 136–145)
WBC # BLD AUTO: 10.67 K/UL (ref 3.9–12.7)

## 2019-09-17 PROCEDURE — 96374 THER/PROPH/DIAG INJ IV PUSH: CPT

## 2019-09-17 PROCEDURE — 96361 HYDRATE IV INFUSION ADD-ON: CPT

## 2019-09-17 PROCEDURE — 80053 COMPREHEN METABOLIC PANEL: CPT

## 2019-09-17 PROCEDURE — 63600175 PHARM REV CODE 636 W HCPCS: Performed by: PHYSICIAN ASSISTANT

## 2019-09-17 PROCEDURE — 99284 EMERGENCY DEPT VISIT MOD MDM: CPT | Mod: 25

## 2019-09-17 PROCEDURE — 85025 COMPLETE CBC W/AUTO DIFF WBC: CPT

## 2019-09-17 RX ORDER — METOCLOPRAMIDE 10 MG/1
10 TABLET ORAL EVERY 6 HOURS
Qty: 30 TABLET | Refills: 0 | Status: SHIPPED | OUTPATIENT
Start: 2019-09-17 | End: 2019-10-09

## 2019-09-17 RX ORDER — METOCLOPRAMIDE HYDROCHLORIDE 5 MG/ML
10 INJECTION INTRAMUSCULAR; INTRAVENOUS
Status: COMPLETED | OUTPATIENT
Start: 2019-09-17 | End: 2019-09-17

## 2019-09-17 RX ADMIN — METOCLOPRAMIDE 10 MG: 5 INJECTION, SOLUTION INTRAMUSCULAR; INTRAVENOUS at 08:09

## 2019-09-17 RX ADMIN — SODIUM CHLORIDE 1000 ML: 0.9 INJECTION, SOLUTION INTRAVENOUS at 08:09

## 2019-09-18 ENCOUNTER — PROCEDURE VISIT (OUTPATIENT)
Dept: OBSTETRICS AND GYNECOLOGY | Facility: CLINIC | Age: 24
End: 2019-09-18
Attending: MIDWIFE
Payer: MEDICAID

## 2019-09-18 DIAGNOSIS — Z34.90 EARLY STAGE OF PREGNANCY: ICD-10-CM

## 2019-09-18 DIAGNOSIS — O21.0 HYPEREMESIS COMPLICATING PREGNANCY, ANTEPARTUM: ICD-10-CM

## 2019-09-18 PROCEDURE — 76801 OB US < 14 WKS SINGLE FETUS: CPT | Mod: PBBFAC | Performed by: OBSTETRICS & GYNECOLOGY

## 2019-09-18 PROCEDURE — 76801 PR US, OB <14WKS, TRANSABD, SINGLE GESTATION: ICD-10-PCS | Mod: 26,S$PBB,, | Performed by: OBSTETRICS & GYNECOLOGY

## 2019-09-18 PROCEDURE — 76801 OB US < 14 WKS SINGLE FETUS: CPT | Mod: 26,S$PBB,, | Performed by: OBSTETRICS & GYNECOLOGY

## 2019-09-18 NOTE — ED PROVIDER NOTES
SCRIBE #1 NOTE: I, Adair Bledsoe, am scribing for, and in the presence of, ART Kumar. I have scribed the entire note.      History      Chief Complaint   Patient presents with    Emesis     x 3 weeks, seen here on  for same. +Pregnancy. Unknown how far along, US scheduled tomorrow.        Review of patient's allergies indicates:  No Known Allergies     HPI   HPI    2019, 7:34 PM   History obtained from the patient      History of Present Illness: Juliet Jalloh is a 24 y.o. female patient who presents to the Emergency Department for emesis, onset 3 weeks PTA. Pt presented to the ED on 19 for similar sxs. Pt is pregnant, but is unsure how far along. She is scheduled for a US with her OB tomorrow. Symptoms are episodic and moderate in severity. Pt reports 7 episodes of emesis today, and states that she had noticed red streaks in her vomit. No mitigating or exacerbating factors reported. No associated sxs reported. Patient denies any fever, chills, diarrhea, SOB, CP, weakness, numbness, dizziness, headache, and all other sxs at this time. Prior Tx includes Phenergan, with no improvement of sxs. No further complaints or concerns at this time.       Arrival mode: Personal vehicle     PCP: Primary Doctor No       Past Medical History:  Past Medical History:   Diagnosis Date    Abnormal Pap smear of cervix     Herpes simplex virus (HSV) infection     Pre-eclampsia in third trimester 2017       Past Surgical History:  Past Surgical History:   Procedure Laterality Date     SECTION      DELIVERY- SECTION N/A 2017    Performed by Guillaume Babb MD at [facility]    MOUTH SURGERY           Family History:  Family History   Problem Relation Age of Onset    Diabetes Maternal Grandmother     Hypertension Maternal Grandmother     Heart disease Maternal Grandmother     Colon cancer Maternal Grandfather     Cancer Neg Hx        Social History:  Social History      Tobacco Use    Smoking status: Never Smoker    Smokeless tobacco: Never Used   Substance and Sexual Activity    Alcohol use: Not Currently     Frequency: Monthly or less     Drinks per session: 1 or 2     Binge frequency: Never     Comment: social use     Drug use: Yes     Types: Marijuana     Comment: Last used in August 2019    Sexual activity: Yes     Partners: Male     Birth control/protection: None       ROS   Review of Systems   Constitutional: Negative for chills, diaphoresis, fatigue and fever.   HENT: Negative for sore throat.    Respiratory: Negative for shortness of breath.    Cardiovascular: Negative for chest pain.   Gastrointestinal: Positive for nausea and vomiting.   Genitourinary: Negative for dysuria.   Musculoskeletal: Negative for back pain.   Skin: Negative for rash and wound.   Neurological: Negative for dizziness, weakness, light-headedness, numbness and headaches.   Hematological: Does not bruise/bleed easily.   All other systems reviewed and are negative.    Physical Exam      Initial Vitals   BP Pulse Resp Temp SpO2   09/17/19 1917 09/17/19 1917 09/17/19 1917 09/17/19 1916 09/17/19 1917   134/78 91 18 98.7 °F (37.1 °C) 100 %      MAP       --                 Physical Exam  Nursing Notes and Vital Signs Reviewed.  Vital signs and nursing notes reviewed.  Constitutional: Patient is in mild distress.  Head: Atraumatic and normocephalic.  Eyes: Normal sclera.  ENT: Moist mucosa.  Cardiovascular: Well perfused.  Pulmonary/Chest: No respiratory distress.  Abdominal:  Soft and non-distended.  No rebound, guarding, or rigidity.  No organomegaly. No pulsatile mass. Normal bowel sounds.  Musculoskeletal: Moves all extremities.  Skin: Dry and nl in appearance.  Neurological: Awake and alert.  Psychological: Nl affect.    ED Course    Procedures  ED Vital Signs:  Vitals:    09/17/19 1916 09/17/19 1917 09/17/19 2024   BP:  134/78    Pulse:  91    Resp:  18    Temp: 98.7 °F (37.1 °C) 98.7 °F  "(37.1 °C)    TempSrc: Oral Oral    SpO2:  100%    Weight: 118.5 kg (261 lb 2.2 oz)     Height:   5' 6" (1.676 m)       Abnormal Lab Results:  Labs Reviewed   CBC W/ AUTO DIFFERENTIAL - Abnormal; Notable for the following components:       Result Value    Hemoglobin 11.8 (*)     Hematocrit 35.7 (*)     Platelets 370 (*)     All other components within normal limits   COMPREHENSIVE METABOLIC PANEL - Abnormal; Notable for the following components:    Potassium 3.4 (*)     Total Protein 8.9 (*)     AST 57 (*)      (*)     All other components within normal limits        All Lab Results:  Results for orders placed or performed during the hospital encounter of 09/17/19   CBC auto differential   Result Value Ref Range    WBC 10.67 3.90 - 12.70 K/uL    RBC 4.30 4.00 - 5.40 M/uL    Hemoglobin 11.8 (L) 12.0 - 16.0 g/dL    Hematocrit 35.7 (L) 37.0 - 48.5 %    Mean Corpuscular Volume 83 82 - 98 fL    Mean Corpuscular Hemoglobin 27.4 27.0 - 31.0 pg    Mean Corpuscular Hemoglobin Conc 33.1 32.0 - 36.0 g/dL    RDW 13.8 11.5 - 14.5 %    Platelets 370 (H) 150 - 350 K/uL    MPV 9.4 9.2 - 12.9 fL    Gran # (ANC) 7.4 1.8 - 7.7 K/uL    Lymph # 2.6 1.0 - 4.8 K/uL    Mono # 0.6 0.3 - 1.0 K/uL    Eos # 0.1 0.0 - 0.5 K/uL    Baso # 0.02 0.00 - 0.20 K/uL    Gran% 69.8 38.0 - 73.0 %    Lymph% 24.2 18.0 - 48.0 %    Mono% 5.5 4.0 - 15.0 %    Eosinophil% 0.5 0.0 - 8.0 %    Basophil% 0.2 0.0 - 1.9 %    Differential Method Automated    Comprehensive metabolic panel   Result Value Ref Range    Sodium 138 136 - 145 mmol/L    Potassium 3.4 (L) 3.5 - 5.1 mmol/L    Chloride 100 95 - 110 mmol/L    CO2 27 23 - 29 mmol/L    Glucose 94 70 - 110 mg/dL    BUN, Bld 7 6 - 20 mg/dL    Creatinine 0.7 0.5 - 1.4 mg/dL    Calcium 9.9 8.7 - 10.5 mg/dL    Total Protein 8.9 (H) 6.0 - 8.4 g/dL    Albumin 4.2 3.5 - 5.2 g/dL    Total Bilirubin 0.5 0.1 - 1.0 mg/dL    Alkaline Phosphatase 127 55 - 135 U/L    AST 57 (H) 10 - 40 U/L     (H) 10 - 44 U/L    Anion " Gap 11 8 - 16 mmol/L    eGFR if African American >60 >60 mL/min/1.73 m^2    eGFR if non African American >60 >60 mL/min/1.73 m^2            The Emergency Provider reviewed the vital signs and test results, which are outlined above.    ED Discussion     8:51 PM: Reassessed pt at this time. Discussed with pt all pertinent ED information and results. Discussed pt dx and plan of tx. Gave pt all f/u and return to the ED instructions. All questions and concerns were addressed at this time. Pt expresses understanding of information and instructions, and is comfortable with plan to discharge. Pt is stable for discharge.    I discussed with patient and/or family/caretaker that evaluation in the ED does not suggest any emergent or life threatening medical conditions requiring immediate intervention beyond what was provided in the ED, and I believe patient is safe for discharge.  Regardless, an unremarkable evaluation in the ED does not preclude the development or presence of a serious of life threatening condition. As such, patient was instructed to return immediately for any worsening or change in current symptoms.    ED Medication(s):  Medications   metoclopramide HCl injection 10 mg (10 mg Intravenous Given 9/17/19 2009)   sodium chloride 0.9% bolus 1,000 mL (1,000 mLs Intravenous New Bag 9/17/19 2008)          New Prescriptions    METOCLOPRAMIDE HCL (REGLAN) 10 MG TABLET    Take 1 tablet (10 mg total) by mouth every 6 (six) hours.         Medical Decision Making    Medical Decision Making:   Clinical Tests:   Lab Tests: Ordered and Reviewed           Scribe Attestation:   Scribe #1: I performed the above scribed service and the documentation accurately describes the services I performed. I attest to the accuracy of the note.    Attending:   Physician Attestation Statement for Scribe #1: I, ART Kumar, personally performed the services described in this documentation, as scribed by Adair Bledsoe, in my presence,  and it is both accurate and complete.          Clinical Impression       ICD-10-CM ICD-9-CM   1. Vomiting, intractability of vomiting not specified, presence of nausea not specified, unspecified vomiting type R11.10 787.03       Disposition:   Disposition: Discharged  Condition: Stable         ART Kumar  09/20/19 1122

## 2019-09-18 NOTE — ED NOTES
Patient identifiers verified and correct for [facility]. Patient here for N/V x 3 weeks. States she is pregnant but she does not know how far along she is. Patient states she has received prenatal care and has seen her OB. Was seen here 5 days ago for the same symptoms. LMP 7/20.    LOC: The patient is awake, alert and aware of environment with an appropriate affect, the patient is oriented x 3 and speaking appropriately.  APPEARANCE: Patient resting comfortably and in no acute distress, patient is clean and well groomed, patient's clothing is properly fastened.  SKIN: The skin is warm and dry, color consistent with ethnicity, patient has normal skin turgor and moist mucus membranes, skin intact, no breakdown or bruising noted.  MUSCULOSKELETAL: Patient moving all extremities spontaneously.  RESPIRATORY: Airway is open and patent, respirations are spontaneous.  CARDIAC: Patient has a normal rate, no periphreal edema noted, capillary refill < 3 seconds.  ABDOMEN: Soft and non tender to palpation. Patient has complaints of nausea and vomiting for 3 weeks. States she is having blood in her vomit - describes it as coffee-grounds. Patient states she has a UTI but can't take her antibiotics because they make her nauseated and she throws them up.    Pt AAOx3, resting in bed, side rails up x 2, call bell within reach. NAD at this time. Will continue to monitor.

## 2019-09-23 ENCOUNTER — PATIENT MESSAGE (OUTPATIENT)
Dept: OBSTETRICS AND GYNECOLOGY | Facility: CLINIC | Age: 24
End: 2019-09-23

## 2019-09-24 ENCOUNTER — HOSPITAL ENCOUNTER (EMERGENCY)
Facility: HOSPITAL | Age: 24
Discharge: HOME | End: 2019-09-24
Attending: EMERGENCY MEDICINE
Payer: MEDICAID

## 2019-09-24 VITALS
SYSTOLIC BLOOD PRESSURE: 129 MMHG | DIASTOLIC BLOOD PRESSURE: 84 MMHG | BODY MASS INDEX: 40.18 KG/M2 | OXYGEN SATURATION: 100 % | WEIGHT: 250 LBS | HEIGHT: 66 IN | HEART RATE: 103 BPM | TEMPERATURE: 99 F | RESPIRATION RATE: 18 BRPM
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
## 2019-09-24 DIAGNOSIS — S09.90XA INJURY OF HEAD, INITIAL ENCOUNTER: ICD-10-CM

## 2019-09-24 DIAGNOSIS — S63.501A SPRAIN OF RIGHT WRIST, INITIAL ENCOUNTER: Primary | ICD-10-CM

## 2019-09-24 PROCEDURE — 99283 EMERGENCY DEPT VISIT LOW MDM: CPT | Mod: 25

## 2019-09-24 PROCEDURE — 29125 APPL SHORT ARM SPLINT STATIC: CPT | Mod: RT

## 2019-09-24 NOTE — ED NOTES
Bed: 23  Expected date: 9/24/19  Expected time:   Means of arrival:   Comments:  Shubham when clean

## 2019-09-24 NOTE — ED PROVIDER NOTES
SCRIBE #1 NOTE: I, Cheryl Hernández, am scribing for, and in the presence of, Go Zafar Jr., ARELIS. I have scribed the entire note.       History     Chief Complaint   Patient presents with    Fall     2 nights ago, down the stairs 12 stairs. complaints of wrist pain and headache     Review of patient's allergies indicates:  No Known Allergies      History of Present Illness     HPI    2019, 3:45 PM  History obtained from the patient      History of Present Illness: Juliet Jalloh is a 24 y.o. female pregnant patient at 10 weeks with a PMHx of HSV and pre-eclampsia who presents to the Emergency Department for evaluation following a fall which onset gradually 2 nights ago. Pt reports that she fell down the stairs at her apartment which caused her to fall forward. She is c/o R wrist pain, lower back pain, and head pain. Symptoms are constant and moderate in severity. No mitigating or exacerbating factors reported. Associated sxs include HA. Patient denies any fever, chills, rhinorrhea, SOB, CP, abd pain, n/v, dysuria, vaginal bleeding, vaginal discharge, pelvic pain, hematuria, neck pain, gait problem, dizziness, extremity weakness/numbness, LOC, and all other sxs at this time. Pt is able to ambulate without difficulty. No prior Tx reported. No further complaints or concerns at this time.       Arrival mode: Personal vehicle    PCP: Primary Doctor No        Past Medical History:  Past Medical History:   Diagnosis Date    Abnormal Pap smear of cervix     Herpes simplex virus (HSV) infection     Pre-eclampsia in third trimester 2017       Past Surgical History:  Past Surgical History:   Procedure Laterality Date     SECTION      MOUTH SURGERY           Family History:  Family History   Problem Relation Age of Onset    Diabetes Maternal Grandmother     Hypertension Maternal Grandmother     Heart disease Maternal Grandmother     Colon cancer Maternal Grandfather     Cancer Neg Hx         Social History:  Social History     Tobacco Use    Smoking status: Never Smoker    Smokeless tobacco: Never Used   Substance and Sexual Activity    Alcohol use: Not Currently     Frequency: Monthly or less     Drinks per session: 1 or 2     Binge frequency: Never     Comment: social use     Drug use: Yes     Types: Marijuana     Comment: Last used in August 2019    Sexual activity: Yes     Partners: Male     Birth control/protection: None        Review of Systems     Review of Systems   Constitutional: Negative for chills and fever.   HENT: Negative for nosebleeds, rhinorrhea and sore throat.         (+) head trauma  (+) head pain   Respiratory: Negative for cough and shortness of breath.    Cardiovascular: Negative for chest pain and leg swelling.   Gastrointestinal: Negative for abdominal pain, diarrhea, nausea and vomiting.   Genitourinary: Negative for dysuria, pelvic pain, vaginal bleeding and vaginal discharge.   Musculoskeletal: Positive for arthralgias (R wrist) and back pain (lower). Negative for gait problem and neck pain.        (-) knee pain  (-) hip pain  (-) shoulder pain   Skin: Negative for wound.   Neurological: Positive for headaches. Negative for dizziness, syncope, weakness, light-headedness and numbness.   All other systems reviewed and are negative.       Physical Exam     Initial Vitals [09/24/19 1510]   BP Pulse Resp Temp SpO2   129/84 103 18 99 °F (37.2 °C) 100 %      MAP       --          Physical Exam  Nursing Notes and Vital Signs Reviewed.  Constitutional: Patient is in no acute distress. Awake and alert. Appropriate for age.   Head: Atraumatic. No facial instability or step-offs. Tenderness over the L occipital scalp.  Eyes: PERRL. EOM normal. Conjunctivae normal.   HENT: Moist mucous membranes. No epistaxis. Patent airway.   Neck: No midline bony tenderness, deformities, or step-offs   Cardiovascular: Regular rate and rhythm. Heart sounds are normal. Intact distal pulses    Pulmonary/Chest: No respiratory distress. Breath sounds are normal. No decreased breath sounds. Chest wall is stable.   Abdominal: Soft and non-distended. Non-tender.   Back: No abrasions or ecchymosis. L thoracic paraspinal tenderness. Tenderness over the R buttocks. No midline bony tenderness to the T-spine or L-spine. No deformities or step-offs.   Musculoskeletal: Full range of motion in bilateral extremities. No obvious deformities.   Right Hand: No obvious deformity. Tenderness over the dorsum of the R wrist. Radial, median, and ulnar nerves are intact. Radial and ulnar pulses are 2+. Normal capillary refill.  Distal sensation is intact. NVI distally.   Skin: Normal color. No cyanosis. No lacerations. No abrasions   Neurological: Awake and alert. Appropriate for age. GCS 15. Normal speech. Motor strength is normal at 5/5 bilaterally. Non-focal neurological examination.     ED Course   Splint Application  Date/Time: 2019 5:01 PM  Performed by: ARELIS Snachez Jr.  Authorized by: James Shepherd MD   Consent Done: Yes  Consent: Verbal consent obtained.  Risks and benefits: risks, benefits and alternatives were discussed  Consent given by: patient  Patient understanding: patient states understanding of the procedure being performed  Patient consent: the patient's understanding of the procedure matches consent given  Procedure consent: procedure consent matches procedure scheduled  Relevant documents: relevant documents present and verified  Imaging studies: imaging studies available  Required items: required blood products, implants, devices, and special equipment available  Patient identity confirmed:  and name  Location details: right wrist  Splint type: velcro wrist splint.  Post-procedure: The splinted body part was neurovascularly unchanged following the procedure.  Patient tolerance: Patient tolerated the procedure well with no immediate complications        ED Vital Signs:  Vitals:     "09/24/19 1510   BP: 129/84   Pulse: 103   Resp: 18   Temp: 99 °F (37.2 °C)   TempSrc: Oral   SpO2: 100%   Weight: 113.4 kg (250 lb)   Height: 5' 6" (1.676 m)       Abnormal Lab Results:  Labs Reviewed - No data to display       Imaging Results:  Imaging Results          X-Ray Wrist Complete Right (Final result)  Result time 09/24/19 16:05:52    Final result by Jordi Hwang MD (09/24/19 16:05:52)                 Impression:      No acute fracture or dislocation.      Electronically signed by: Jordi Hwang MD  Date:    09/24/2019  Time:    16:05             Narrative:    EXAMINATION:  XR WRIST COMPLETE 3 VIEWS RIGHT    CLINICAL HISTORY:  XR WRIST COMPLETE 3 VIEWS RIGHTUnspecified fall, initial encounter    COMPARISON:  None    FINDINGS:  Three views of the right wrist were obtained.    No evidence of acute fracture or dislocation.  Bony mineralization is normal.  Soft tissues are unremarkable.                                          The Emergency Provider reviewed the vital signs and test results, which are outlined above.     ED Discussion     4:59 PM: Bedside US performed. There is positive fetal movement and cardiac activity.    5:02 PM: Discussed with pt all pertinent ED information and results. Discussed pt dx and plan of tx. Gave pt all f/u and return to the ED instructions. All questions and concerns were addressed at this time. Pt expresses understanding of information and instructions, and is comfortable with plan to discharge. Pt is stable for discharge.    Trauma precautions were discussed with patient and/or family/caretaker; I do not specifically detect any abdominal, thoracic, CNS, orthopedic, or other emergent or life threatening condition and that patient is safe to be discharged.  It was also discussed that despite an unrevealing examination and negative radiographic examination for serious or life threatening injury, these conditions may still exist.  As such, patient should return to ED " immediately should they experience, severe or worsening pain, shortness of breath, abdominal pain, headache, vomiting, or any other concern.  It was also discussed that not infrequently, injuries may not be diagnosed during the initial ED visit (such as fractures) and that if the patient discovers a new area of concern, a new area of injury that was not evaluated in the ED, they should return for evaluation as they may have an injury that requires treatment.     Medical Decision Making:   Clinical Tests:   Radiological Study: Ordered and Reviewed           ED Medication(s):  Medications - No data to display    New Prescriptions    No medications on file       Follow-up Information     [facility]    Specialty:  Emergency Medicine  Why:  If symptoms worsen  Contact information:  [id number]  [place] [address] [phone]                     Scribe Attestation:   Scribe #1: I performed the above scribed service and the documentation accurately describes the services I performed. I attest to the accuracy of the note.     Attending:   Physician Attestation Statement for Scribe #1: I, ARELIS Sanchez Jr., personally performed the services described in this documentation, as scribed by Cheryl Hernández, in my presence, and it is both accurate and complete.           Clinical Impression       ICD-10-CM ICD-9-CM   1. Sprain of right wrist, initial encounter S63.501A 842.00   2. Fall W19.XXXA E888.9   3. Injury of head, initial encounter S09.90XA 959.01       Disposition:   Disposition: Discharged  Condition: Stable         ARELIS Sanchez Jr.  09/24/19 6797

## 2019-09-27 ENCOUNTER — PATIENT MESSAGE (OUTPATIENT)
Dept: OBSTETRICS AND GYNECOLOGY | Facility: CLINIC | Age: 24
End: 2019-09-27

## 2019-10-09 ENCOUNTER — LAB VISIT (OUTPATIENT)
Dept: LAB | Facility: HOSPITAL | Age: 24
End: 2019-10-09
Attending: OBSTETRICS & GYNECOLOGY
Payer: MEDICAID

## 2019-10-09 ENCOUNTER — ROUTINE PRENATAL (OUTPATIENT)
Dept: OBSTETRICS AND GYNECOLOGY | Facility: CLINIC | Age: 24
End: 2019-10-09
Payer: MEDICAID

## 2019-10-09 VITALS — DIASTOLIC BLOOD PRESSURE: 64 MMHG | BODY MASS INDEX: 42.45 KG/M2 | WEIGHT: 263 LBS | SYSTOLIC BLOOD PRESSURE: 118 MMHG

## 2019-10-09 DIAGNOSIS — O21.9 NAUSEA AND VOMITING DURING PREGNANCY PRIOR TO 22 WEEKS GESTATION: ICD-10-CM

## 2019-10-09 DIAGNOSIS — Z98.891 H/O: C-SECTION: ICD-10-CM

## 2019-10-09 DIAGNOSIS — Z3A.11 PREGNANCY WITH 11 COMPLETED WEEKS GESTATION: ICD-10-CM

## 2019-10-09 DIAGNOSIS — Z3A.11 PREGNANCY WITH 11 COMPLETED WEEKS GESTATION: Primary | ICD-10-CM

## 2019-10-09 PROCEDURE — 99212 OFFICE O/P EST SF 10 MIN: CPT | Mod: TH,S$PBB,, | Performed by: OBSTETRICS & GYNECOLOGY

## 2019-10-09 PROCEDURE — 99212 PR OFFICE/OUTPT VISIT, EST, LEVL II, 10-19 MIN: ICD-10-PCS | Mod: TH,S$PBB,, | Performed by: OBSTETRICS & GYNECOLOGY

## 2019-10-09 PROCEDURE — 87340 HEPATITIS B SURFACE AG IA: CPT

## 2019-10-09 PROCEDURE — 99999 PR PBB SHADOW E&M-EST. PATIENT-LVL II: ICD-10-PCS | Mod: PBBFAC,,, | Performed by: OBSTETRICS & GYNECOLOGY

## 2019-10-09 PROCEDURE — 99999 PR PBB SHADOW E&M-EST. PATIENT-LVL II: CPT | Mod: PBBFAC,,, | Performed by: OBSTETRICS & GYNECOLOGY

## 2019-10-09 PROCEDURE — 99212 OFFICE O/P EST SF 10 MIN: CPT | Mod: PBBFAC,25,TH | Performed by: OBSTETRICS & GYNECOLOGY

## 2019-10-09 PROCEDURE — 85660 RBC SICKLE CELL TEST: CPT

## 2019-10-09 PROCEDURE — 36415 COLL VENOUS BLD VENIPUNCTURE: CPT

## 2019-10-09 PROCEDURE — 86762 RUBELLA ANTIBODY: CPT

## 2019-10-09 PROCEDURE — 86901 BLOOD TYPING SEROLOGIC RH(D): CPT

## 2019-10-09 RX ORDER — SCOLOPAMINE TRANSDERMAL SYSTEM 1 MG/1
1 PATCH, EXTENDED RELEASE TRANSDERMAL
Qty: 12 PATCH | Refills: 0 | Status: SHIPPED | OUTPATIENT
Start: 2019-10-09 | End: 2019-11-12

## 2019-10-09 RX ORDER — PROMETHAZINE HYDROCHLORIDE 6.25 MG/5ML
25 SYRUP ORAL EVERY 6 HOURS PRN
Qty: 473 ML | Refills: 3 | Status: SHIPPED | OUTPATIENT
Start: 2019-10-09 | End: 2019-12-02 | Stop reason: SDUPTHER

## 2019-10-09 RX ORDER — ONDANSETRON 4 MG/1
8 TABLET, FILM COATED ORAL EVERY 6 HOURS PRN
Qty: 180 TABLET | Refills: 6 | Status: SHIPPED | OUTPATIENT
Start: 2019-10-09 | End: 2019-11-08

## 2019-10-10 LAB
ABO + RH BLD: NORMAL
BLD GP AB SCN CELLS X3 SERPL QL: NORMAL
HGB S BLD QL SOLY: NEGATIVE

## 2019-10-10 NOTE — PROGRESS NOTES
Pt reports nausea/vomiting improved but still occurring; exacerbated by ptyalism. Phenergan liquid, zofran, scopolamine escripted. Pt desires repeat csection. Unsure about contraception at this time. Desires  [id number] info given. Remaining OB labs ordered today. RTC 4 weeks

## 2019-10-11 LAB
HBV SURFACE AG SERPL QL IA: NEGATIVE
RUBV IGG SER-ACNC: 29.9 IU/ML
RUBV IGG SER-IMP: REACTIVE

## 2019-10-18 ENCOUNTER — PATIENT MESSAGE (OUTPATIENT)
Dept: OBSTETRICS AND GYNECOLOGY | Facility: CLINIC | Age: 24
End: 2019-10-18

## 2019-11-17 ENCOUNTER — PATIENT MESSAGE (OUTPATIENT)
Dept: OBSTETRICS AND GYNECOLOGY | Facility: CLINIC | Age: 24
End: 2019-11-17

## 2019-11-20 ENCOUNTER — ROUTINE PRENATAL (OUTPATIENT)
Dept: OBSTETRICS AND GYNECOLOGY | Facility: CLINIC | Age: 24
End: 2019-11-20
Payer: MEDICAID

## 2019-11-20 VITALS — WEIGHT: 261 LBS | DIASTOLIC BLOOD PRESSURE: 68 MMHG | BODY MASS INDEX: 42.13 KG/M2 | SYSTOLIC BLOOD PRESSURE: 118 MMHG

## 2019-11-20 DIAGNOSIS — Z3A.17 PREGNANCY WITH 17 COMPLETED WEEKS GESTATION: Primary | ICD-10-CM

## 2019-11-20 DIAGNOSIS — Z36.89 ENCOUNTER FOR FETAL ANATOMIC SURVEY: ICD-10-CM

## 2019-11-20 DIAGNOSIS — F32.A DEPRESSION AFFECTING PREGNANCY IN SECOND TRIMESTER, ANTEPARTUM: ICD-10-CM

## 2019-11-20 DIAGNOSIS — Z98.891 H/O: C-SECTION: ICD-10-CM

## 2019-11-20 DIAGNOSIS — O99.342 DEPRESSION AFFECTING PREGNANCY IN SECOND TRIMESTER, ANTEPARTUM: ICD-10-CM

## 2019-11-20 PROCEDURE — 99212 OFFICE O/P EST SF 10 MIN: CPT | Mod: TH,S$PBB,, | Performed by: OBSTETRICS & GYNECOLOGY

## 2019-11-20 PROCEDURE — 99212 PR OFFICE/OUTPT VISIT, EST, LEVL II, 10-19 MIN: ICD-10-PCS | Mod: TH,S$PBB,, | Performed by: OBSTETRICS & GYNECOLOGY

## 2019-11-20 PROCEDURE — 99999 PR PBB SHADOW E&M-EST. PATIENT-LVL II: ICD-10-PCS | Mod: PBBFAC,,, | Performed by: OBSTETRICS & GYNECOLOGY

## 2019-11-20 PROCEDURE — 99999 PR PBB SHADOW E&M-EST. PATIENT-LVL II: CPT | Mod: PBBFAC,,, | Performed by: OBSTETRICS & GYNECOLOGY

## 2019-11-20 PROCEDURE — 99212 OFFICE O/P EST SF 10 MIN: CPT | Mod: PBBFAC,TH | Performed by: OBSTETRICS & GYNECOLOGY

## 2019-11-20 RX ORDER — ONDANSETRON 4 MG/1
TABLET, FILM COATED ORAL
COMMUNITY
Start: 2019-11-17 | End: 2019-11-20

## 2019-11-20 RX ORDER — HYDROCODONE BITARTRATE AND ACETAMINOPHEN 7.5; 325 MG/1; MG/1
TABLET ORAL
Refills: 0 | COMMUNITY
Start: 2019-10-21 | End: 2019-11-20

## 2019-11-20 RX ORDER — CEPHALEXIN 500 MG/1
500 CAPSULE ORAL 2 TIMES DAILY
Refills: 0 | COMMUNITY
Start: 2019-09-09 | End: 2020-01-24

## 2019-11-20 RX ORDER — PENICILLIN V POTASSIUM 500 MG/1
TABLET, FILM COATED ORAL
Refills: 0 | COMMUNITY
Start: 2019-10-21 | End: 2019-11-20

## 2019-11-20 RX ORDER — CITALOPRAM 10 MG/1
TABLET ORAL
COMMUNITY
End: 2019-11-20

## 2019-11-20 RX ORDER — BUPROPION HYDROCHLORIDE 150 MG/1
150 TABLET, EXTENDED RELEASE ORAL 2 TIMES DAILY
Qty: 60 TABLET | Refills: 11 | Status: SHIPPED | OUTPATIENT
Start: 2019-11-20 | End: 2020-01-24

## 2019-11-20 RX ORDER — ONDANSETRON 4 MG/1
TABLET, ORALLY DISINTEGRATING ORAL
Refills: 0 | COMMUNITY
Start: 2019-09-30 | End: 2019-11-20

## 2019-11-28 NOTE — PROGRESS NOTES
Pt c/o depression & stress due to break up w/ FOB. Pt currently unemployed, mother living w/ her but she is also unemployed. Discussed treatment options, r/b/se-will try wellbutrin. Still having symptoms of nausea & ptyalism but seems to be tolerating. Will f/u w/ pt by phone in 2 wks, RTC 4wk

## 2019-12-02 DIAGNOSIS — O21.9 NAUSEA AND VOMITING DURING PREGNANCY: Primary | ICD-10-CM

## 2019-12-02 RX ORDER — PROMETHAZINE HYDROCHLORIDE 6.25 MG/5ML
25 SYRUP ORAL EVERY 6 HOURS PRN
Qty: 473 ML | Refills: 3 | Status: SHIPPED | OUTPATIENT
Start: 2019-12-02 | End: 2020-01-24

## 2019-12-06 ENCOUNTER — PATIENT MESSAGE (OUTPATIENT)
Dept: OBSTETRICS AND GYNECOLOGY | Facility: CLINIC | Age: 24
End: 2019-12-06

## 2019-12-06 RX ORDER — VALACYCLOVIR HYDROCHLORIDE 500 MG/1
500 TABLET, FILM COATED ORAL 3 TIMES DAILY
Qty: 9 TABLET | Refills: 0 | Status: SHIPPED | OUTPATIENT
Start: 2019-12-06 | End: 2019-12-09

## 2019-12-06 RX ORDER — VALACYCLOVIR HYDROCHLORIDE 1 G/1
1000 TABLET, FILM COATED ORAL DAILY
Qty: 30 TABLET | Refills: 11 | Status: SHIPPED | OUTPATIENT
Start: 2019-12-06 | End: 2020-01-24

## 2019-12-08 ENCOUNTER — HOSPITAL ENCOUNTER (OUTPATIENT)
Facility: HOSPITAL | Age: 24
Discharge: HOME | End: 2019-12-08
Attending: OBSTETRICS & GYNECOLOGY | Admitting: OBSTETRICS & GYNECOLOGY
Payer: MEDICAID

## 2019-12-08 VITALS
HEART RATE: 90 BPM | WEIGHT: 257 LBS | TEMPERATURE: 99 F | DIASTOLIC BLOOD PRESSURE: 57 MMHG | SYSTOLIC BLOOD PRESSURE: 107 MMHG | OXYGEN SATURATION: 100 % | BODY MASS INDEX: 41.3 KG/M2 | HEIGHT: 66 IN | RESPIRATION RATE: 17 BRPM

## 2019-12-08 DIAGNOSIS — O21.9 NAUSEA AND VOMITING IN PREGNANCY: ICD-10-CM

## 2019-12-08 LAB
BACTERIA #/AREA URNS HPF: ABNORMAL /HPF
BASOPHILS # BLD AUTO: 0.01 K/UL (ref 0–0.2)
BASOPHILS NFR BLD: 0.1 % (ref 0–1.9)
BILIRUB UR QL STRIP: NEGATIVE
CLARITY UR: CLEAR
COLOR UR: YELLOW
DIFFERENTIAL METHOD: ABNORMAL
EOSINOPHIL # BLD AUTO: 0 K/UL (ref 0–0.5)
EOSINOPHIL NFR BLD: 0.4 % (ref 0–8)
ERYTHROCYTE [DISTWIDTH] IN BLOOD BY AUTOMATED COUNT: 14 % (ref 11.5–14.5)
GLUCOSE UR QL STRIP: NEGATIVE
HCT VFR BLD AUTO: 31.7 % (ref 37–48.5)
HGB BLD-MCNC: 10.2 G/DL (ref 12–16)
HGB UR QL STRIP: NEGATIVE
HIV1+2 IGG SERPL QL IA.RAPID: NEGATIVE
HYALINE CASTS #/AREA URNS LPF: 0 /LPF
IMM GRANULOCYTES # BLD AUTO: 0.04 K/UL (ref 0–0.04)
IMM GRANULOCYTES NFR BLD AUTO: 0.4 % (ref 0–0.5)
KETONES UR QL STRIP: ABNORMAL
LEUKOCYTE ESTERASE UR QL STRIP: NEGATIVE
LYMPHOCYTES # BLD AUTO: 1.1 K/UL (ref 1–4.8)
LYMPHOCYTES NFR BLD: 9.5 % (ref 18–48)
MCH RBC QN AUTO: 28.1 PG (ref 27–31)
MCHC RBC AUTO-ENTMCNC: 32.2 G/DL (ref 32–36)
MCV RBC AUTO: 87 FL (ref 82–98)
MICROSCOPIC COMMENT: ABNORMAL
MONOCYTES # BLD AUTO: 0.4 K/UL (ref 0.3–1)
MONOCYTES NFR BLD: 3.9 % (ref 4–15)
NEUTROPHILS # BLD AUTO: 9.6 K/UL (ref 1.8–7.7)
NEUTROPHILS NFR BLD: 85.7 % (ref 38–73)
NITRITE UR QL STRIP: NEGATIVE
NRBC BLD-RTO: 0 /100 WBC
PH UR STRIP: 7 [PH] (ref 5–8)
PLATELET # BLD AUTO: 380 K/UL (ref 150–350)
PMV BLD AUTO: 10.1 FL (ref 9.2–12.9)
PROT UR QL STRIP: ABNORMAL
RBC # BLD AUTO: 3.63 M/UL (ref 4–5.4)
RBC #/AREA URNS HPF: 5 /HPF (ref 0–4)
SP GR UR STRIP: 1.02 (ref 1–1.03)
SQUAMOUS #/AREA URNS HPF: 5 /HPF
URN SPEC COLLECT METH UR: ABNORMAL
UROBILINOGEN UR STRIP-ACNC: ABNORMAL EU/DL
WBC # BLD AUTO: 11.2 K/UL (ref 3.9–12.7)
WBC #/AREA URNS HPF: 15 /HPF (ref 0–5)

## 2019-12-08 PROCEDURE — 87086 URINE CULTURE/COLONY COUNT: CPT

## 2019-12-08 PROCEDURE — 99213 OFFICE O/P EST LOW 20 MIN: CPT | Mod: TH,,, | Performed by: ADVANCED PRACTICE MIDWIFE

## 2019-12-08 PROCEDURE — 81000 URINALYSIS NONAUTO W/SCOPE: CPT

## 2019-12-08 PROCEDURE — 36415 COLL VENOUS BLD VENIPUNCTURE: CPT

## 2019-12-08 PROCEDURE — 87340 HEPATITIS B SURFACE AG IA: CPT

## 2019-12-08 PROCEDURE — 86592 SYPHILIS TEST NON-TREP QUAL: CPT

## 2019-12-08 PROCEDURE — 96360 HYDRATION IV INFUSION INIT: CPT

## 2019-12-08 PROCEDURE — 63600175 PHARM REV CODE 636 W HCPCS: Performed by: ADVANCED PRACTICE MIDWIFE

## 2019-12-08 PROCEDURE — 99211 OFF/OP EST MAY X REQ PHY/QHP: CPT | Mod: 25,TH

## 2019-12-08 PROCEDURE — 99213 PR OFFICE/OUTPT VISIT, EST, LEVL III, 20-29 MIN: ICD-10-PCS | Mod: TH,,, | Performed by: ADVANCED PRACTICE MIDWIFE

## 2019-12-08 PROCEDURE — 96368 THER/DIAG CONCURRENT INF: CPT

## 2019-12-08 PROCEDURE — 86703 HIV-1/HIV-2 1 RESULT ANTBDY: CPT

## 2019-12-08 PROCEDURE — 85025 COMPLETE CBC W/AUTO DIFF WBC: CPT

## 2019-12-08 RX ORDER — ACETAMINOPHEN 500 MG
500 TABLET ORAL EVERY 6 HOURS PRN
Status: DISCONTINUED | OUTPATIENT
Start: 2019-12-08 | End: 2019-12-08 | Stop reason: HOSPADM

## 2019-12-08 RX ORDER — ONDANSETRON 8 MG/1
8 TABLET, ORALLY DISINTEGRATING ORAL EVERY 8 HOURS PRN
Status: DISCONTINUED | OUTPATIENT
Start: 2019-12-08 | End: 2019-12-08 | Stop reason: HOSPADM

## 2019-12-08 RX ORDER — SODIUM CHLORIDE 9 MG/ML
INJECTION, SOLUTION INTRAVENOUS CONTINUOUS
Status: DISCONTINUED | OUTPATIENT
Start: 2019-12-08 | End: 2019-12-08 | Stop reason: HOSPADM

## 2019-12-08 RX ADMIN — PROMETHAZINE HYDROCHLORIDE 12.5 MG: 25 INJECTION INTRAMUSCULAR; INTRAVENOUS at 02:12

## 2019-12-08 RX ADMIN — SODIUM CHLORIDE, SODIUM LACTATE, POTASSIUM CHLORIDE, AND CALCIUM CHLORIDE 1000 ML: .6; .31; .03; .02 INJECTION, SOLUTION INTRAVENOUS at 02:12

## 2019-12-08 NOTE — SUBJECTIVE & OBJECTIVE
Obstetric HPI:  Patient reports None contractions, active fetal movement, No vaginal bleeding , No loss of fluid     This pregnancy has been complicated by   Previous , desires repeat  hyperemesis     OB History    Para Term  AB Living   2 1 1 0 0 1   SAB TAB Ectopic Multiple Live Births   0 0 0 0 1      # Outcome Date GA Lbr Goldy/2nd Weight Sex Delivery Anes PTL Lv   2 Current            1 Term 17 37w1d  2.835 kg (6 lb 4 oz) M CS-LTranv Gen N BETTINA      Complications: Failure to Progress in First Stage      Name: VANESA POP      Apgar1: 7  Apgar5: 9     Past Medical History:   Diagnosis Date    Abnormal Pap smear of cervix     Herpes simplex virus (HSV) infection     Pre-eclampsia in third trimester 2017     Past Surgical History:   Procedure Laterality Date     SECTION      MOUTH SURGERY         PTA Medications   Medication Sig    buPROPion (WELLBUTRIN SR) 150 MG TBSR 12 hr tablet Take 1 tablet (150 mg total) by mouth 2 (two) times daily.    cephALEXin (KEFLEX) 500 MG capsule Take 500 mg by mouth 2 (two) times daily.    promethazine (PHENERGAN) 6.25 mg/5 mL syrup Take 20 mLs (25 mg total) by mouth every 6 (six) hours as needed for Nausea.    valACYclovir (VALTREX) 1000 MG tablet Take 1 tablet (1,000 mg total) by mouth once daily.    valACYclovir (VALTREX) 500 MG tablet Take 1 tablet (500 mg total) by mouth 3 (three) times daily. for 3 days       Review of patient's allergies indicates:  No Known Allergies     Family History     Problem Relation (Age of Onset)    Colon cancer Maternal Grandfather    Diabetes Maternal Grandmother    Heart disease Maternal Grandmother    Hypertension Maternal Grandmother        Tobacco Use    Smoking status: Never Smoker    Smokeless tobacco: Never Used   Substance and Sexual Activity    Alcohol use: Not Currently     Frequency: Monthly or less     Drinks per session: 1 or 2     Binge frequency: Never     Comment: social use      Drug use: Yes     Types: Marijuana     Comment: Last used in August 2019    Sexual activity: Yes     Partners: Male     Birth control/protection: None     Review of Systems   Gastrointestinal: Positive for nausea and vomiting.   All other systems reviewed and are negative.     Objective:     Vital Signs (Most Recent):  Temp: 99.2 °F (37.3 °C) (12/08/19 1335)  Pulse: 80 (12/08/19 1418)  Resp: 17 (12/08/19 1452)  BP: (!) 108/54 (12/08/19 1418)  SpO2: 100 % (12/08/19 1418) Vital Signs (24h Range):  Temp:  [99.2 °F (37.3 °C)] 99.2 °F (37.3 °C)  Pulse:  [80-99] 80  Resp:  [17-19] 17  SpO2:  [100 %] 100 %  BP: (108-118)/(51-74) 108/54     Weight: 116.6 kg (257 lb)  Body mass index is 41.48 kg/m².    Physical Exam:   Constitutional: She is oriented to person, place, and time. She appears well-developed and well-nourished.    HENT:   Head: Normocephalic.     Neck: Normal range of motion.    Cardiovascular: Normal rate and regular rhythm.     Pulmonary/Chest: Effort normal.        Abdominal: Soft.     Genitourinary: Uterus normal.           Musculoskeletal: Normal range of motion and moves all extremeties.       Neurological: She is alert and oriented to person, place, and time.    Skin: Skin is warm and dry.        Cervix:  Deffered     Significant Labs:  Lab Results   Component Value Date    GROUPTRH O POS 10/09/2019    HEPBSAG Negative 10/09/2019    STREPBCULT STREPTOCOCCUS AGALACTIAE (GROUP B) 07/18/2017       I have personallly reviewed all pertinent lab results from the last 24 hours.

## 2019-12-08 NOTE — DISCHARGE SUMMARY
"[facility] -   Obstetrics  Discharge Summary      Patient Name: Juliet Jalloh  MRN: [id number]  Admission Date: 2019  Hospital Length of Stay: 0 days  Discharge Date and Time: No discharge date for patient encounter.  Attending Physician: Gale Engle MD   Discharging Provider: Carley Soto CNM   Primary Care Provider: Primary Doctor No    HPI: 24 y.o.  @ 20w1d presents to hospital with complaints of n/v that has been increasingly worse since last night. States "I've lost 16 pounds this pregnancy, and I keep doing this."       * No surgery found *     Hospital Course:   Feeling better after IV hydration and IV phenergan. Able to hold down sandwich tray.          Final Active Diagnoses:    Diagnosis Date Noted POA    PRINCIPAL PROBLEM:  Nausea and vomiting in pregnancy [O21.9] 2019 Yes      Problems Resolved During this Admission:        Labs: All labs within the past 24 hours have been reviewed      Immunizations     None          This patient has no babies on file.  Pending Diagnostic Studies:     Procedure Component Value Units Date/Time    Hepatitis B surface antigen [[id number]] Collected:  19    Order Status:  Sent Lab Status:  In process Updated:  19    Specimen:  Blood     RPR [[id number]] Collected:  19    Order Status:  Sent Lab Status:  In process Updated:  19    Specimen:  Blood           Discharged Condition: stable    Disposition: Home or Self Care    Follow Up:  Follow-up Information     Please follow up.    Why:  keep next PNV               Patient Instructions:      Notify your health care provider if you experience any of the following:  temperature >100.4     Notify your health care provider if you experience any of the following:  persistent nausea and vomiting or diarrhea     Notify your health care provider if you experience any of the following:  severe uncontrolled pain     Medications:  Current Discharge " Medication List      CONTINUE these medications which have NOT CHANGED    Details   buPROPion (WELLBUTRIN SR) 150 MG TBSR 12 hr tablet Take 1 tablet (150 mg total) by mouth 2 (two) times daily.  Qty: 60 tablet, Refills: 11      cephALEXin (KEFLEX) 500 MG capsule Take 500 mg by mouth 2 (two) times daily.  Refills: 0      promethazine (PHENERGAN) 6.25 mg/5 mL syrup Take 20 mLs (25 mg total) by mouth every 6 (six) hours as needed for Nausea.  Qty: 473 mL, Refills: 3    Associated Diagnoses: Nausea and vomiting during pregnancy      !! valACYclovir (VALTREX) 1000 MG tablet Take 1 tablet (1,000 mg total) by mouth once daily.  Qty: 30 tablet, Refills: 11      !! valACYclovir (VALTREX) 500 MG tablet Take 1 tablet (500 mg total) by mouth 3 (three) times daily. for 3 days  Qty: 9 tablet, Refills: 0       !! - Potential duplicate medications found. Please discuss with provider.          Carley Soto CNM  Obstetrics  [facility] [place]

## 2019-12-08 NOTE — ASSESSMENT & PLAN NOTE
IV hydration, IV phenergan. After hydration and phenergan patient states that she is feeling much better. D/C home with dietary recommendations.

## 2019-12-08 NOTE — DISCHARGE INSTRUCTIONS
Discharge Instructions    Self Care Instructions:    Diet:  · Eat from the five basic food groups  · Fruits and proteins are good choices  · Limit fast foods and added salt/sugar  · Moderate carbonated and caffeine drinks    Hydration:  · Drink at least 8 large glasses of water a day    Kick Counts:  · After a meal, rest on your side and note the baby's movements until you have 8-10 movements in a 2 hour counting period.    · If you do not feel your baby move 8-10 times within 2 hours or you sense a change in the type or character of the baby's movement, you should come in to the hospital at once.  · Remember; your baby can sleep for 20-40 minutes at a time.      When to notify your provider:    · Vaginal bleeding like a period;  You may spot if we examined your cervix.  · If your water breaks, come to the birth center.  Note time, color and odor.  · Abdominal tenderness or pain that does not go away  · Contractions every 3 to 5 minutes for 1 to 2 hours.  True contractions move from front to back, are regular; usually get longer, stronger and closer together and do not stop if you change your position or activity.  · Any burning, urgency or frequency in relation to emptying your bladder.  · Any temperature greater than 100.4 degrees, chills, flu-like symptoms      [phone]

## 2019-12-08 NOTE — H&P
"[facility] -   Obstetrics  History & Physical    Patient Name: Juliet Pop  MRN: [id number]  Admission Date: 2019  Primary Care Provider: Primary Doctor No    Subjective:     Principal Problem:Nausea and vomiting in pregnancy    History of Present Illness:  24 y.o.  @ 20w1d presents to hospital with complaints of n/v that has been increasingly worse since last night. States "I've lost 16 pounds this pregnancy, and I keep doing this."     Obstetric HPI:  Patient reports None contractions, active fetal movement, No vaginal bleeding , No loss of fluid     This pregnancy has been complicated by   Previous , desires repeat  hyperemesis     OB History    Para Term  AB Living   2 1 1 0 0 1   SAB TAB Ectopic Multiple Live Births   0 0 0 0 1      # Outcome Date GA Lbr Goldy/2nd Weight Sex Delivery Anes PTL Lv   2 Current            1 Term 17 37w1d  2.835 kg (6 lb 4 oz) M CS-LTranv Gen N BETTINA      Complications: Failure to Progress in First Stage      Name: VANESA POP      Apgar1: 7  Apgar5: 9     Past Medical History:   Diagnosis Date    Abnormal Pap smear of cervix     Herpes simplex virus (HSV) infection     Pre-eclampsia in third trimester 2017     Past Surgical History:   Procedure Laterality Date     SECTION      MOUTH SURGERY         PTA Medications   Medication Sig    buPROPion (WELLBUTRIN SR) 150 MG TBSR 12 hr tablet Take 1 tablet (150 mg total) by mouth 2 (two) times daily.    cephALEXin (KEFLEX) 500 MG capsule Take 500 mg by mouth 2 (two) times daily.    promethazine (PHENERGAN) 6.25 mg/5 mL syrup Take 20 mLs (25 mg total) by mouth every 6 (six) hours as needed for Nausea.    valACYclovir (VALTREX) 1000 MG tablet Take 1 tablet (1,000 mg total) by mouth once daily.    valACYclovir (VALTREX) 500 MG tablet Take 1 tablet (500 mg total) by mouth 3 (three) times daily. for 3 days       Review of patient's allergies indicates:  No " Known Allergies     Family History     Problem Relation (Age of Onset)    Colon cancer Maternal Grandfather    Diabetes Maternal Grandmother    Heart disease Maternal Grandmother    Hypertension Maternal Grandmother        Tobacco Use    Smoking status: Never Smoker    Smokeless tobacco: Never Used   Substance and Sexual Activity    Alcohol use: Not Currently     Frequency: Monthly or less     Drinks per session: 1 or 2     Binge frequency: Never     Comment: social use     Drug use: Yes     Types: Marijuana     Comment: Last used in August 2019    Sexual activity: Yes     Partners: Male     Birth control/protection: None     Review of Systems   Gastrointestinal: Positive for nausea and vomiting.   All other systems reviewed and are negative.     Objective:     Vital Signs (Most Recent):  Temp: 99.2 °F (37.3 °C) (12/08/19 1335)  Pulse: 80 (12/08/19 1418)  Resp: 17 (12/08/19 1452)  BP: (!) 108/54 (12/08/19 1418)  SpO2: 100 % (12/08/19 1418) Vital Signs (24h Range):  Temp:  [99.2 °F (37.3 °C)] 99.2 °F (37.3 °C)  Pulse:  [80-99] 80  Resp:  [17-19] 17  SpO2:  [100 %] 100 %  BP: (108-118)/(51-74) 108/54     Weight: 116.6 kg (257 lb)  Body mass index is 41.48 kg/m².    Physical Exam:   Constitutional: She is oriented to person, place, and time. She appears well-developed and well-nourished.    HENT:   Head: Normocephalic.     Neck: Normal range of motion.    Cardiovascular: Normal rate and regular rhythm.     Pulmonary/Chest: Effort normal.        Abdominal: Soft.     Genitourinary: Uterus normal.           Musculoskeletal: Normal range of motion and moves all extremeties.       Neurological: She is alert and oriented to person, place, and time.    Skin: Skin is warm and dry.        Cervix:  Deffered     Significant Labs:  Lab Results   Component Value Date    GROUPTRH O POS 10/09/2019    HEPBSAG Negative 10/09/2019    STREPBCULT STREPTOCOCCUS AGALACTIAE (GROUP B) 07/18/2017       I have personallly reviewed all  pertinent lab results from the last 24 hours.    Assessment/Plan:     24 y.o. female  at 20w1d for:    * Nausea and vomiting in pregnancy  IV hydration, IV phenergan. After hydration and phenergan patient states that she is feeling much better. D/C home with dietary recommendations.         Carley Soto CNM  Obstetrics  [facility]

## 2019-12-09 LAB
BACTERIA UR CULT: NORMAL
BACTERIA UR CULT: NORMAL
HBV SURFACE AG SERPL QL IA: NEGATIVE
RPR SER QL: NORMAL

## 2019-12-09 NOTE — NURSING
Pt verbalized not taking Wellbutrin due to not wanting to take meds, worried about side effects and how it will make her feel. Pt reassured; pt educated that different meds are safe to take while pregnant; encouraged to go back to psychiatrist, counseling, or meds if she so desires.

## 2019-12-11 ENCOUNTER — ROUTINE PRENATAL (OUTPATIENT)
Dept: OBSTETRICS AND GYNECOLOGY | Facility: CLINIC | Age: 24
End: 2019-12-11
Payer: MEDICAID

## 2019-12-11 ENCOUNTER — PROCEDURE VISIT (OUTPATIENT)
Dept: OBSTETRICS AND GYNECOLOGY | Facility: CLINIC | Age: 24
End: 2019-12-11
Payer: MEDICAID

## 2019-12-11 VITALS
DIASTOLIC BLOOD PRESSURE: 64 MMHG | WEIGHT: 262.81 LBS | SYSTOLIC BLOOD PRESSURE: 104 MMHG | BODY MASS INDEX: 42.42 KG/M2

## 2019-12-11 DIAGNOSIS — Z36.89 ENCOUNTER FOR FETAL ANATOMIC SURVEY: ICD-10-CM

## 2019-12-11 DIAGNOSIS — Z98.891 H/O: C-SECTION: ICD-10-CM

## 2019-12-11 PROCEDURE — 99212 PR OFFICE/OUTPT VISIT, EST, LEVL II, 10-19 MIN: ICD-10-PCS | Mod: TH,S$PBB,, | Performed by: OBSTETRICS & GYNECOLOGY

## 2019-12-11 PROCEDURE — 99999 PR PBB SHADOW E&M-EST. PATIENT-LVL II: CPT | Mod: PBBFAC,,, | Performed by: OBSTETRICS & GYNECOLOGY

## 2019-12-11 PROCEDURE — 99212 OFFICE O/P EST SF 10 MIN: CPT | Mod: TH,S$PBB,, | Performed by: OBSTETRICS & GYNECOLOGY

## 2019-12-11 PROCEDURE — 76805 PR US, OB 14+WKS, TRANSABD, SINGLE GESTATION: ICD-10-PCS | Mod: 26,S$PBB,, | Performed by: OBSTETRICS & GYNECOLOGY

## 2019-12-11 PROCEDURE — 76805 OB US >/= 14 WKS SNGL FETUS: CPT | Mod: 26,S$PBB,, | Performed by: OBSTETRICS & GYNECOLOGY

## 2019-12-11 PROCEDURE — 99212 OFFICE O/P EST SF 10 MIN: CPT | Mod: PBBFAC,25 | Performed by: OBSTETRICS & GYNECOLOGY

## 2019-12-11 PROCEDURE — 99999 PR PBB SHADOW E&M-EST. PATIENT-LVL II: ICD-10-PCS | Mod: PBBFAC,,, | Performed by: OBSTETRICS & GYNECOLOGY

## 2019-12-11 PROCEDURE — 76805 OB US >/= 14 WKS SNGL FETUS: CPT | Mod: PBBFAC | Performed by: OBSTETRICS & GYNECOLOGY

## 2019-12-12 ENCOUNTER — TELEPHONE (OUTPATIENT)
Dept: OBSTETRICS AND GYNECOLOGY | Facility: HOSPITAL | Age: 24
End: 2019-12-12

## 2019-12-14 NOTE — PROGRESS NOTES
FOB present today, stays on his phone entire visit (see last note). Pt did not  antidepressant, aware it was sent. Lost info about [id number] again. Anatomy scan today w/ subopts-RTC 4 weeks for visit & u/s

## 2020-01-04 ENCOUNTER — HOSPITAL ENCOUNTER (INPATIENT)
Facility: HOSPITAL | Age: 25
LOS: 3 days | Discharge: PSYCHIATRIC OR SUBSTANCE USE FACILITY | DRG: 832 | End: 2020-01-07
Attending: EMERGENCY MEDICINE | Admitting: OBSTETRICS & GYNECOLOGY
Payer: MEDICAID

## 2020-01-04 DIAGNOSIS — O21.9 NAUSEA AND VOMITING IN PREGNANCY: ICD-10-CM

## 2020-01-04 DIAGNOSIS — E87.6 HYPOKALEMIA: ICD-10-CM

## 2020-01-04 DIAGNOSIS — O23.42 UTI (URINARY TRACT INFECTION) DURING PREGNANCY, SECOND TRIMESTER: Primary | ICD-10-CM

## 2020-01-04 PROBLEM — F41.9 ANXIETY DISORDER, UNSPECIFIED: Status: ACTIVE | Noted: 2018-10-04

## 2020-01-04 PROBLEM — F41.0 PANIC DISORDER: Status: ACTIVE | Noted: 2018-10-04

## 2020-01-04 PROBLEM — F32.9 MDD (MAJOR DEPRESSIVE DISORDER): Status: ACTIVE | Noted: 2018-10-04

## 2020-01-04 LAB
ALBUMIN SERPL BCP-MCNC: 3 G/DL (ref 3.5–5.2)
ALP SERPL-CCNC: 115 U/L (ref 55–135)
ALT SERPL W/O P-5'-P-CCNC: 7 U/L (ref 10–44)
ANION GAP SERPL CALC-SCNC: 11 MMOL/L (ref 8–16)
AST SERPL-CCNC: 9 U/L (ref 10–40)
BACTERIA #/AREA URNS HPF: ABNORMAL /HPF
BASOPHILS # BLD AUTO: 0.02 K/UL (ref 0–0.2)
BASOPHILS NFR BLD: 0.2 % (ref 0–1.9)
BILIRUB SERPL-MCNC: 0.4 MG/DL (ref 0.1–1)
BILIRUB UR QL STRIP: ABNORMAL
BUN SERPL-MCNC: 6 MG/DL (ref 6–20)
CALCIUM SERPL-MCNC: 8.9 MG/DL (ref 8.7–10.5)
CHLORIDE SERPL-SCNC: 104 MMOL/L (ref 95–110)
CLARITY UR: CLEAR
CO2 SERPL-SCNC: 24 MMOL/L (ref 23–29)
COLOR UR: YELLOW
CREAT SERPL-MCNC: 0.6 MG/DL (ref 0.5–1.4)
DIFFERENTIAL METHOD: ABNORMAL
EOSINOPHIL # BLD AUTO: 0.1 K/UL (ref 0–0.5)
EOSINOPHIL NFR BLD: 1 % (ref 0–8)
ERYTHROCYTE [DISTWIDTH] IN BLOOD BY AUTOMATED COUNT: 12.9 % (ref 11.5–14.5)
EST. GFR  (AFRICAN AMERICAN): >60 ML/MIN/1.73 M^2
EST. GFR  (NON AFRICAN AMERICAN): >60 ML/MIN/1.73 M^2
GLUCOSE SERPL-MCNC: 81 MG/DL (ref 70–110)
GLUCOSE UR QL STRIP: NEGATIVE
HCT VFR BLD AUTO: 28.7 % (ref 37–48.5)
HGB BLD-MCNC: 9.3 G/DL (ref 12–16)
HGB UR QL STRIP: NEGATIVE
IMM GRANULOCYTES # BLD AUTO: 0.04 K/UL (ref 0–0.04)
IMM GRANULOCYTES NFR BLD AUTO: 0.4 % (ref 0–0.5)
INFLUENZA A, MOLECULAR: NEGATIVE
INFLUENZA B, MOLECULAR: NEGATIVE
KETONES UR QL STRIP: ABNORMAL
LEUKOCYTE ESTERASE UR QL STRIP: ABNORMAL
LYMPHOCYTES # BLD AUTO: 1.9 K/UL (ref 1–4.8)
LYMPHOCYTES NFR BLD: 21.7 % (ref 18–48)
MCH RBC QN AUTO: 28.4 PG (ref 27–31)
MCHC RBC AUTO-ENTMCNC: 32.4 G/DL (ref 32–36)
MCV RBC AUTO: 88 FL (ref 82–98)
MICROSCOPIC COMMENT: ABNORMAL
MONOCYTES # BLD AUTO: 0.5 K/UL (ref 0.3–1)
MONOCYTES NFR BLD: 5.4 % (ref 4–15)
NEUTROPHILS # BLD AUTO: 6.4 K/UL (ref 1.8–7.7)
NEUTROPHILS NFR BLD: 71.3 % (ref 38–73)
NITRITE UR QL STRIP: POSITIVE
NRBC BLD-RTO: 0 /100 WBC
PH UR STRIP: 7 [PH] (ref 5–8)
PLATELET # BLD AUTO: 392 K/UL (ref 150–350)
PMV BLD AUTO: 9.2 FL (ref 9.2–12.9)
POTASSIUM SERPL-SCNC: 3.3 MMOL/L (ref 3.5–5.1)
PROT SERPL-MCNC: 7.2 G/DL (ref 6–8.4)
PROT UR QL STRIP: ABNORMAL
RBC # BLD AUTO: 3.27 M/UL (ref 4–5.4)
RBC #/AREA URNS HPF: 2 /HPF (ref 0–4)
SODIUM SERPL-SCNC: 139 MMOL/L (ref 136–145)
SP GR UR STRIP: 1.02 (ref 1–1.03)
SPECIMEN SOURCE: NORMAL
SQUAMOUS #/AREA URNS HPF: 0 /HPF
URN SPEC COLLECT METH UR: ABNORMAL
UROBILINOGEN UR STRIP-ACNC: ABNORMAL EU/DL
WBC # BLD AUTO: 8.96 K/UL (ref 3.9–12.7)
WBC #/AREA URNS HPF: 50 /HPF (ref 0–5)

## 2020-01-04 PROCEDURE — 87088 URINE BACTERIA CULTURE: CPT

## 2020-01-04 PROCEDURE — 36415 COLL VENOUS BLD VENIPUNCTURE: CPT

## 2020-01-04 PROCEDURE — 87077 CULTURE AEROBIC IDENTIFY: CPT

## 2020-01-04 PROCEDURE — 87186 SC STD MICRODIL/AGAR DIL: CPT

## 2020-01-04 PROCEDURE — 80053 COMPREHEN METABOLIC PANEL: CPT

## 2020-01-04 PROCEDURE — 99285 EMERGENCY DEPT VISIT HI MDM: CPT | Mod: 25

## 2020-01-04 PROCEDURE — 87086 URINE CULTURE/COLONY COUNT: CPT

## 2020-01-04 PROCEDURE — 96375 TX/PRO/DX INJ NEW DRUG ADDON: CPT

## 2020-01-04 PROCEDURE — 87502 INFLUENZA DNA AMP PROBE: CPT

## 2020-01-04 PROCEDURE — 81000 URINALYSIS NONAUTO W/SCOPE: CPT

## 2020-01-04 PROCEDURE — 85025 COMPLETE CBC W/AUTO DIFF WBC: CPT

## 2020-01-04 PROCEDURE — 96361 HYDRATE IV INFUSION ADD-ON: CPT

## 2020-01-04 PROCEDURE — 96367 TX/PROPH/DG ADDL SEQ IV INF: CPT

## 2020-01-04 PROCEDURE — 96365 THER/PROPH/DIAG IV INF INIT: CPT

## 2020-01-04 PROCEDURE — 11000001 HC ACUTE MED/SURG PRIVATE ROOM

## 2020-01-04 PROCEDURE — 63600175 PHARM REV CODE 636 W HCPCS: Performed by: NURSE PRACTITIONER

## 2020-01-04 RX ORDER — ONDANSETRON 2 MG/ML
4 INJECTION INTRAMUSCULAR; INTRAVENOUS
Status: COMPLETED | OUTPATIENT
Start: 2020-01-04 | End: 2020-01-05

## 2020-01-04 RX ORDER — ACETAMINOPHEN 325 MG/1
650 TABLET ORAL EVERY 6 HOURS PRN
Status: DISCONTINUED | OUTPATIENT
Start: 2020-01-04 | End: 2020-01-07 | Stop reason: HOSPADM

## 2020-01-04 RX ORDER — AMOXICILLIN 250 MG
1 CAPSULE ORAL NIGHTLY PRN
Status: DISCONTINUED | OUTPATIENT
Start: 2020-01-04 | End: 2020-01-07

## 2020-01-04 RX ORDER — SIMETHICONE 80 MG
1 TABLET,CHEWABLE ORAL EVERY 6 HOURS PRN
Status: DISCONTINUED | OUTPATIENT
Start: 2020-01-04 | End: 2020-01-07 | Stop reason: HOSPADM

## 2020-01-04 RX ORDER — ONDANSETRON 2 MG/ML
4 INJECTION INTRAMUSCULAR; INTRAVENOUS
Status: COMPLETED | OUTPATIENT
Start: 2020-01-04 | End: 2020-01-04

## 2020-01-04 RX ORDER — DIPHENHYDRAMINE HYDROCHLORIDE 50 MG/ML
25 INJECTION INTRAMUSCULAR; INTRAVENOUS EVERY 4 HOURS PRN
Status: DISCONTINUED | OUTPATIENT
Start: 2020-01-04 | End: 2020-01-07 | Stop reason: HOSPADM

## 2020-01-04 RX ORDER — MORPHINE SULFATE 4 MG/ML
4 INJECTION, SOLUTION INTRAMUSCULAR; INTRAVENOUS
Status: COMPLETED | OUTPATIENT
Start: 2020-01-04 | End: 2020-01-05

## 2020-01-04 RX ORDER — SCOLOPAMINE TRANSDERMAL SYSTEM 1 MG/1
1 PATCH, EXTENDED RELEASE TRANSDERMAL
Status: DISCONTINUED | OUTPATIENT
Start: 2020-01-05 | End: 2020-01-07 | Stop reason: HOSPADM

## 2020-01-04 RX ORDER — MORPHINE SULFATE 4 MG/ML
4 INJECTION, SOLUTION INTRAMUSCULAR; INTRAVENOUS
Status: COMPLETED | OUTPATIENT
Start: 2020-01-04 | End: 2020-01-04

## 2020-01-04 RX ORDER — DEXTROSE MONOHYDRATE, SODIUM CHLORIDE, AND POTASSIUM CHLORIDE 50; 1.49; 9 G/1000ML; G/1000ML; G/1000ML
INJECTION, SOLUTION INTRAVENOUS CONTINUOUS
Status: DISCONTINUED | OUTPATIENT
Start: 2020-01-05 | End: 2020-01-07 | Stop reason: HOSPADM

## 2020-01-04 RX ORDER — ONDANSETRON 8 MG/1
8 TABLET, ORALLY DISINTEGRATING ORAL EVERY 8 HOURS PRN
Status: DISCONTINUED | OUTPATIENT
Start: 2020-01-04 | End: 2020-01-07

## 2020-01-04 RX ORDER — DIPHENHYDRAMINE HCL 25 MG
25 CAPSULE ORAL EVERY 4 HOURS PRN
Status: DISCONTINUED | OUTPATIENT
Start: 2020-01-04 | End: 2020-01-07 | Stop reason: HOSPADM

## 2020-01-04 RX ADMIN — SODIUM CHLORIDE, SODIUM LACTATE, POTASSIUM CHLORIDE, AND CALCIUM CHLORIDE 1000 ML: .6; .31; .03; .02 INJECTION, SOLUTION INTRAVENOUS at 07:01

## 2020-01-04 RX ADMIN — MORPHINE SULFATE 4 MG: 4 INJECTION INTRAVENOUS at 08:01

## 2020-01-04 RX ADMIN — ONDANSETRON 4 MG: 2 INJECTION INTRAMUSCULAR; INTRAVENOUS at 07:01

## 2020-01-04 RX ADMIN — PROMETHAZINE HYDROCHLORIDE 12.5 MG: 25 INJECTION INTRAMUSCULAR; INTRAVENOUS at 08:01

## 2020-01-05 PROBLEM — B34.9 ACUTE VIRAL SYNDROME: Status: ACTIVE | Noted: 2020-01-05

## 2020-01-05 PROCEDURE — 99222 PR INITIAL HOSPITAL CARE,LEVL II: ICD-10-PCS | Mod: ,,, | Performed by: OBSTETRICS & GYNECOLOGY

## 2020-01-05 PROCEDURE — 25000003 PHARM REV CODE 250: Performed by: OBSTETRICS & GYNECOLOGY

## 2020-01-05 PROCEDURE — G0378 HOSPITAL OBSERVATION PER HR: HCPCS

## 2020-01-05 PROCEDURE — 63600175 PHARM REV CODE 636 W HCPCS: Performed by: NURSE PRACTITIONER

## 2020-01-05 PROCEDURE — 11000001 HC ACUTE MED/SURG PRIVATE ROOM

## 2020-01-05 PROCEDURE — 99222 1ST HOSP IP/OBS MODERATE 55: CPT | Mod: ,,, | Performed by: OBSTETRICS & GYNECOLOGY

## 2020-01-05 PROCEDURE — 63600175 PHARM REV CODE 636 W HCPCS: Performed by: OBSTETRICS & GYNECOLOGY

## 2020-01-05 RX ORDER — HYDROCODONE BITARTRATE AND ACETAMINOPHEN 7.5; 325 MG/1; MG/1
1 TABLET ORAL EVERY 4 HOURS PRN
Status: DISCONTINUED | OUTPATIENT
Start: 2020-01-05 | End: 2020-01-07 | Stop reason: HOSPADM

## 2020-01-05 RX ORDER — HYDROCODONE BITARTRATE AND ACETAMINOPHEN 5; 325 MG/1; MG/1
1 TABLET ORAL EVERY 4 HOURS PRN
Status: DISCONTINUED | OUTPATIENT
Start: 2020-01-05 | End: 2020-01-07 | Stop reason: HOSPADM

## 2020-01-05 RX ORDER — MORPHINE SULFATE 4 MG/ML
4 INJECTION, SOLUTION INTRAMUSCULAR; INTRAVENOUS EVERY 4 HOURS PRN
Status: DISCONTINUED | OUTPATIENT
Start: 2020-01-05 | End: 2020-01-05

## 2020-01-05 RX ORDER — PSEUDOEPHEDRINE HCL 30 MG
30 TABLET ORAL EVERY 6 HOURS PRN
Status: DISCONTINUED | OUTPATIENT
Start: 2020-01-05 | End: 2020-01-07 | Stop reason: HOSPADM

## 2020-01-05 RX ADMIN — MORPHINE SULFATE 4 MG: 4 INJECTION INTRAVENOUS at 10:01

## 2020-01-05 RX ADMIN — MORPHINE SULFATE 4 MG: 4 INJECTION INTRAVENOUS at 05:01

## 2020-01-05 RX ADMIN — CEFTRIAXONE 1 G: 1 INJECTION, SOLUTION INTRAVENOUS at 09:01

## 2020-01-05 RX ADMIN — ONDANSETRON 8 MG: 8 TABLET, ORALLY DISINTEGRATING ORAL at 05:01

## 2020-01-05 RX ADMIN — PROMETHAZINE HYDROCHLORIDE 12.5 MG: 25 INJECTION INTRAMUSCULAR; INTRAVENOUS at 12:01

## 2020-01-05 RX ADMIN — HYDROCODONE BITARTRATE AND ACETAMINOPHEN 1 TABLET: 7.5; 325 TABLET ORAL at 09:01

## 2020-01-05 RX ADMIN — POTASSIUM CHLORIDE, DEXTROSE MONOHYDRATE AND SODIUM CHLORIDE: 150; 5; 900 INJECTION, SOLUTION INTRAVENOUS at 12:01

## 2020-01-05 RX ADMIN — SCOPALAMINE 1 PATCH: 1 PATCH, EXTENDED RELEASE TRANSDERMAL at 12:01

## 2020-01-05 RX ADMIN — ONDANSETRON 4 MG: 2 INJECTION INTRAMUSCULAR; INTRAVENOUS at 12:01

## 2020-01-05 RX ADMIN — PSEUDOEPHEDRINE HCL 30 MG: 30 TABLET, FILM COATED ORAL at 06:01

## 2020-01-05 RX ADMIN — MORPHINE SULFATE 4 MG: 4 INJECTION INTRAVENOUS at 12:01

## 2020-01-05 RX ADMIN — SENNOSIDES, DOCUSATE SODIUM 1 TABLET: 50; 8.6 TABLET, FILM COATED ORAL at 09:01

## 2020-01-05 RX ADMIN — HYDROCODONE BITARTRATE AND ACETAMINOPHEN 1 TABLET: 7.5; 325 TABLET ORAL at 05:01

## 2020-01-05 RX ADMIN — POTASSIUM CHLORIDE, DEXTROSE MONOHYDRATE AND SODIUM CHLORIDE: 150; 5; 900 INJECTION, SOLUTION INTRAVENOUS at 10:01

## 2020-01-05 NOTE — HPI
24 y.o. female  at 24 weeks EGA, with c/o generalized body aches, chills, fever, nausea, and vomiting, for past 3 days.  Not tolerating oral fluids.

## 2020-01-05 NOTE — ED PROVIDER NOTES
SCRIBE #1 NOTE: I, Jose Suárez, am scribing for, and in the presence of, Jeannette Taylor NP. I have scribed the HPI, ROS, and PEx.     SCRIBE #2 NOTE: I, Melba Herr, am scribing for, and in the presence of,  Jeannette Taylor NP. I have scribed the remaining portions of the note not scribed by Scribe #1.      History     Chief Complaint   Patient presents with    Emesis During Pregnancy     Pt is 24 weeks pregnant, unable to keep anything down. reports emesis and flu like symptoms x 3 days.      Review of patient's allergies indicates:  No Known Allergies      History of Present Illness     HPI    2020, 6:32 PM   History obtained from the patient      History of Present Illness: Juliet Jalloh is a 24 y.o. female patient with a PMHx of HSV who presents to the Emergency Department for evaluation of vomiting which onset gradually 3 days PTA. Pt notes she is 24 weeks pregnant. Symptoms are episodic and moderate in severity. No mitigating or exacerbating factors reported. Pt reports taking sudafed with no relief. Associated sxs include generalized body aches, nausea, weakness, fatigue, AH, chills, cough, congestion, and pelvic pain. Patient denies any fever, diaphoresis, sore throat, SOB, CP, abd pain, vaginal bleeding, vaginal discharge, dysuria, urinary frequency, back pain, and all other sxs at this time. No prior Tx reported. No further complaints or concerns at this time.        Arrival mode: Personal vehicle    PCP: Primary Doctor No        Past Medical History:  Past Medical History:   Diagnosis Date    Abnormal Pap smear of cervix     Herpes simplex virus (HSV) infection     Pre-eclampsia in third trimester 2017       Past Surgical History:  Past Surgical History:   Procedure Laterality Date     SECTION      MOUTH SURGERY           Family History:  Family History   Problem Relation Age of Onset    Diabetes Maternal Grandmother     Hypertension Maternal Grandmother      Heart disease Maternal Grandmother     Colon cancer Maternal Grandfather     Cancer Neg Hx        Social History:  Social History     Tobacco Use    Smoking status: Never Smoker    Smokeless tobacco: Never Used   Substance and Sexual Activity    Alcohol use: Not Currently     Frequency: Monthly or less     Drinks per session: 1 or 2     Binge frequency: Never     Comment: social use     Drug use: Yes     Types: Marijuana     Comment: Last used in August 2019    Sexual activity: Yes     Partners: Male     Birth control/protection: None        Review of Systems     Review of Systems   Constitutional: Positive for chills and fatigue. Negative for diaphoresis and fever.        (+) generalized body aches   HENT: Positive for congestion. Negative for sore throat.    Respiratory: Negative for shortness of breath.    Cardiovascular: Negative for chest pain.   Gastrointestinal: Positive for nausea and vomiting. Negative for abdominal pain.   Genitourinary: Positive for pelvic pain. Negative for dysuria, frequency, vaginal bleeding and vaginal discharge.   Musculoskeletal: Negative for back pain.   Skin: Negative for rash.   Neurological: Positive for headaches. Negative for weakness.   Hematological: Does not bruise/bleed easily.   All other systems reviewed and are negative.       Physical Exam     Initial Vitals [01/04/20 1800]   BP Pulse Resp Temp SpO2   116/71 102 18 99.1 °F (37.3 °C) 99 %      MAP       --          Physical Exam  Nursing Notes and Vital Signs Reviewed.  Constitutional: Patient is in no acute distress. Well-developed and well-nourished.  Head: Atraumatic. Normocephalic.  Eyes: PERRL. EOM intact. Conjunctivae are not pale. No scleral icterus.  Ears: Right TM normal. Left TM normal. No erythema. No bulging. No effusion or air-fluid levels. No perforation.   Nose: Patent nares. Turbinates are normal. No drainage.   Throat: Moist mucous membranes. Posterior oropharynx is symmetric without  erythema. Tonsillar exudate is not present. No trismus. Normal handling of secretions. No stridor.    Neck: Supple. Full ROM. No lymphadenopathy.  Cardiovascular: Regular rate. Regular rhythm. No murmurs, rubs, or gallops. Distal pulses are 2+ and symmetric.  Pulmonary/Chest: No respiratory distress. Clear to auscultation bilaterally. No wheezing or rales.  Abdominal: Soft and non-distended.  There is no tenderness.  No rebound, guarding, or rigidity. Good bowel sounds. Fetal heart tones 160.   Genitourinary: No CVA tenderness  Musculoskeletal: Moves all extremities. No obvious deformities. No edema. No calf tenderness.  Skin: Warm and dry.  Neurological:  Alert, awake, and appropriate.  Normal speech.  No acute focal neurological deficits are appreciated.  Psychiatric: Normal affect. Good eye contact. Appropriate in content.     ED Course   Procedures  ED Vital Signs:  Vitals:    01/04/20 1800 01/04/20 2055   BP: 116/71    Pulse: 102    Resp: 18    Temp: 99.1 °F (37.3 °C) 99 °F (37.2 °C)   TempSrc: Oral Oral   SpO2: 99%        Abnormal Lab Results:  Labs Reviewed   CBC W/ AUTO DIFFERENTIAL - Abnormal; Notable for the following components:       Result Value    RBC 3.27 (*)     Hemoglobin 9.3 (*)     Hematocrit 28.7 (*)     Platelets 392 (*)     All other components within normal limits   COMPREHENSIVE METABOLIC PANEL - Abnormal; Notable for the following components:    Potassium 3.3 (*)     Albumin 3.0 (*)     AST 9 (*)     ALT 7 (*)     All other components within normal limits   URINALYSIS, REFLEX TO URINE CULTURE - Abnormal; Notable for the following components:    Protein, UA Trace (*)     Ketones, UA 3+ (*)     Bilirubin (UA) 1+ (*)     Nitrite, UA Positive (*)     Urobilinogen, UA 2.0-3.0 (*)     Leukocytes, UA Trace (*)     All other components within normal limits    Narrative:     Preferred Collection Type->Urine, Clean Catch   URINALYSIS MICROSCOPIC - Abnormal; Notable for the following components:     WBC, UA 50 (*)     Bacteria Moderate (*)     All other components within normal limits    Narrative:     Preferred Collection Type->Urine, Clean Catch   INFLUENZA A & B BY MOLECULAR   CULTURE, URINE        All Lab Results:  Results for orders placed or performed during the hospital encounter of 01/04/20   Influenza A & B by Molecular   Result Value Ref Range    Influenza A, Molecular Negative Negative    Influenza B, Molecular Negative Negative    Flu A & B Source Nasal swab    CBC auto differential   Result Value Ref Range    WBC 8.96 3.90 - 12.70 K/uL    RBC 3.27 (L) 4.00 - 5.40 M/uL    Hemoglobin 9.3 (L) 12.0 - 16.0 g/dL    Hematocrit 28.7 (L) 37.0 - 48.5 %    Mean Corpuscular Volume 88 82 - 98 fL    Mean Corpuscular Hemoglobin 28.4 27.0 - 31.0 pg    Mean Corpuscular Hemoglobin Conc 32.4 32.0 - 36.0 g/dL    RDW 12.9 11.5 - 14.5 %    Platelets 392 (H) 150 - 350 K/uL    MPV 9.2 9.2 - 12.9 fL    Immature Granulocytes 0.4 0.0 - 0.5 %    Gran # (ANC) 6.4 1.8 - 7.7 K/uL    Immature Grans (Abs) 0.04 0.00 - 0.04 K/uL    Lymph # 1.9 1.0 - 4.8 K/uL    Mono # 0.5 0.3 - 1.0 K/uL    Eos # 0.1 0.0 - 0.5 K/uL    Baso # 0.02 0.00 - 0.20 K/uL    nRBC 0 0 /100 WBC    Gran% 71.3 38.0 - 73.0 %    Lymph% 21.7 18.0 - 48.0 %    Mono% 5.4 4.0 - 15.0 %    Eosinophil% 1.0 0.0 - 8.0 %    Basophil% 0.2 0.0 - 1.9 %    Differential Method Automated    Comprehensive metabolic panel   Result Value Ref Range    Sodium 139 136 - 145 mmol/L    Potassium 3.3 (L) 3.5 - 5.1 mmol/L    Chloride 104 95 - 110 mmol/L    CO2 24 23 - 29 mmol/L    Glucose 81 70 - 110 mg/dL    BUN, Bld 6 6 - 20 mg/dL    Creatinine 0.6 0.5 - 1.4 mg/dL    Calcium 8.9 8.7 - 10.5 mg/dL    Total Protein 7.2 6.0 - 8.4 g/dL    Albumin 3.0 (L) 3.5 - 5.2 g/dL    Total Bilirubin 0.4 0.1 - 1.0 mg/dL    Alkaline Phosphatase 115 55 - 135 U/L    AST 9 (L) 10 - 40 U/L    ALT 7 (L) 10 - 44 U/L    Anion Gap 11 8 - 16 mmol/L    eGFR if African American >60 >60 mL/min/1.73 m^2    eGFR if non  African American >60 >60 mL/min/1.73 m^2   Urinalysis, Reflex to Urine Culture Urine, Clean Catch   Result Value Ref Range    Specimen UA Urine, Clean Catch     Color, UA Yellow Yellow, Straw, Thu    Appearance, UA Clear Clear    pH, UA 7.0 5.0 - 8.0    Specific Gravity, UA 1.025 1.005 - 1.030    Protein, UA Trace (A) Negative    Glucose, UA Negative Negative    Ketones, UA 3+ (A) Negative    Bilirubin (UA) 1+ (A) Negative    Occult Blood UA Negative Negative    Nitrite, UA Positive (A) Negative    Urobilinogen, UA 2.0-3.0 (A) <2.0 EU/dL    Leukocytes, UA Trace (A) Negative   Urinalysis Microscopic   Result Value Ref Range    RBC, UA 2 0 - 4 /hpf    WBC, UA 50 (H) 0 - 5 /hpf    Bacteria Moderate (A) None-Occ /hpf    Squam Epithel, UA 0 /hpf    Microscopic Comment SEE COMMENT          Imaging Results:  Imaging Results    None              The Emergency Provider reviewed the vital signs and test results, which are outlined above.     ED Discussion     8:24 PM: Re-evaluated pt. Pt is resting comfortably and is in no acute distress.  Pt states she is still nauseated after taking the zofran and is still having body aches everywhere.  D/w pt all pertinent results. D/w pt any concerns expressed at this time. Answered all questions. Pt expresses understanding at this time.     10:00PM: Patient was unable to tolerate PO challenge and reports no relief of complaints of nausea or vomiting, body aches, generalized weakness, and dizziness with medications and IV fluids.  Discussed with patient we will consult Ob for evaluation of nausea and vomiting in pregnancy, dehydration, hypokalemia, UTI.  The pt verbalized understanding and agreement of treatment and discharge plan.     2220:  Dr. Car with Ob consulted  And recommended admission to observation for IV Fluids and IV abx.  I discussed with patient and patient verbalized agreement and understanding of admission plan.    ED Medication(s):  Medications   acetaminophen  tablet 650 mg (has no administration in time range)   ondansetron disintegrating tablet 8 mg (has no administration in time range)   promethazine (PHENERGAN) 12.5 mg in dextrose 5 % 50 mL IVPB (has no administration in time range)   senna-docusate 8.6-50 mg per tablet 1 tablet (has no administration in time range)   simethicone chewable tablet 80 mg (has no administration in time range)   diphenhydrAMINE capsule 25 mg (has no administration in time range)   diphenhydrAMINE injection 25 mg (has no administration in time range)   cefTRIAXone (ROCEPHIN) 1 g in dextrose 5 % 50 mL IVPB (has no administration in time range)   dextrose 5 % and 0.9 % NaCl with KCl 20 mEq infusion (has no administration in time range)   scopolamine 1.3-1.5 mg (1 mg over 3 days) 1 patch (has no administration in time range)   lactated ringers bolus 1,000 mL (0 mLs Intravenous Stopped 1/4/20 2147)   ondansetron injection 4 mg (4 mg Intravenous Given 1/4/20 1902)   promethazine (PHENERGAN) 12.5 mg in dextrose 5 % 50 mL IVPB (0 mg Intravenous Stopped 1/4/20 2104)   morphine injection 4 mg (4 mg Intravenous Given by Other 1/4/20 2044)       New Prescriptions    No medications on file                 Medical Decision Making:   Clinical Tests:   Lab Tests: Ordered and Reviewed             Scribe Attestation:   Scribe #1: I performed the above scribed service and the documentation accurately describes the services I performed. I attest to the accuracy of the note.     Attending:   Physician Attestation Statement for Scribe #1: I, Jeannette Taylor NP, personally performed the services described in this documentation, as scribed by Jose Suárez, in my presence, and it is both accurate and complete.       Scribe Attestation:   Scribe #2: I performed the above scribed service and the documentation accurately describes the services I performed. I attest to the accuracy of the note.    Attending Attestation:           Physician Attestation for  Scribe:    Physician Attestation Statement for Scribe #2: I, Jeannette Taylor NP, reviewed documentation, as scribed by Melba Herr in my presence, and it is both accurate and complete. I also acknowledge and confirm the content of the note done by Scribe #1.           Clinical Impression       ICD-10-CM ICD-9-CM   1. UTI (urinary tract infection) during pregnancy, second trimester O23.42 646.63     599.0   2. Nausea and vomiting in pregnancy O21.9 643.90   3. Hypokalemia E87.6 276.8       Disposition:   Disposition: Admitted  Condition: Stable        Jeannette Taylor NP  01/04/20 3753

## 2020-01-05 NOTE — PLAN OF CARE
Problem: Adult Inpatient Plan of Care  Goal: Plan of Care Review  Outcome: Ongoing, Progressing     POC reviewed, including indications and possible side effects of administered medications. Patient verbalized understanding and teach back. No adverse reactions noted. Patient c/o generalized pain and nausea. Administered medications per order. Scope patch on L ear. VSS. No s/s of acute distress noted. Patient remains free of falls and injuries during shift. Will continue to monitor.    Chart check complete.

## 2020-01-05 NOTE — ED NOTES
"Pt c/o emesis and body aches x 2 days. States, "I can't keep anything down. I'm tired of throwing up." Denies fever, diarrhea, CP, SOB, HA, numbness, tingling, or any other symptoms at this time.     Patient identifiers verified and correct for [facility].    LOC: The patient is awake, alert and aware of environment with an appropriate affect, the patient is oriented x 3 and speaking appropriately.  APPEARANCE: Patient tearful, patient is clean and well groomed, patient's clothing is properly fastened.  SKIN: The skin is warm and dry, color consistent with ethnicity, patient has normal skin turgor and moist mucus membranes, skin intact, no breakdown or bruising noted.  MUSCULOSKELETAL: Patient moving all extremities spontaneously.  RESPIRATORY: Airway is open and patent, respirations are spontaneous.  CARDIAC: Patient has a normal rate, no peripheral edema noted, capillary refill < 3 seconds.  ABDOMEN: Soft and non tender to palpation.  GI: 3 episodes of emesis while at bedside.    "

## 2020-01-05 NOTE — SUBJECTIVE & OBJECTIVE
Obstetric HPI:  Patient reports No contractions, active fetal movement, No vaginal bleeding , No loss of fluid     This pregnancy has been complicated by previous  x 1.    OB History    Para Term  AB Living   2 1 1 0 0 1   SAB TAB Ectopic Multiple Live Births   0 0 0 0 1      # Outcome Date GA Lbr Goldy/2nd Weight Sex Delivery Anes PTL Lv   2 Current            1 Term 17 37w1d  2.835 kg (6 lb 4 oz) M CS-LTranv Gen N BETTINA      Complications: Failure to Progress in First Stage      Name: VANESA POP      Apgar1: 7  Apgar5: 9     Past Medical History:   Diagnosis Date    Abnormal Pap smear of cervix     Herpes simplex virus (HSV) infection     Pre-eclampsia in third trimester 2017     Past Surgical History:   Procedure Laterality Date     SECTION      MOUTH SURGERY         PTA Medications   Medication Sig    prenatal vits62/FA/om3/dha/epa (PRENATAL GUMMY ORAL) Take by mouth.    promethazine (PHENERGAN) 6.25 mg/5 mL syrup Take 20 mLs (25 mg total) by mouth every 6 (six) hours as needed for Nausea.    buPROPion (WELLBUTRIN SR) 150 MG TBSR 12 hr tablet Take 1 tablet (150 mg total) by mouth 2 (two) times daily. (Patient not taking: Reported on 2019)    cephALEXin (KEFLEX) 500 MG capsule Take 500 mg by mouth 2 (two) times daily.    valACYclovir (VALTREX) 1000 MG tablet Take 1 tablet (1,000 mg total) by mouth once daily.       Review of patient's allergies indicates:  No Known Allergies     Family History     Problem Relation (Age of Onset)    Colon cancer Maternal Grandfather    Diabetes Maternal Grandmother    Heart disease Maternal Grandmother    Hypertension Maternal Grandmother        Tobacco Use    Smoking status: Never Smoker    Smokeless tobacco: Never Used   Substance and Sexual Activity    Alcohol use: Not Currently     Frequency: Monthly or less     Drinks per session: 1 or 2     Binge frequency: Never     Comment: social use     Drug use: Yes      Types: Marijuana     Comment: Last used in August 2019    Sexual activity: Yes     Partners: Male     Birth control/protection: None     Review of Systems   Constitutional: Positive for chills, fatigue and fever.   Respiratory: Negative for cough and shortness of breath.    Cardiovascular: Negative for chest pain.   Gastrointestinal: Positive for nausea and vomiting. Negative for abdominal pain.   Genitourinary: Negative for dysuria, pelvic pain and vaginal bleeding.   Musculoskeletal: Positive for myalgias.      Objective:     Vital Signs (Most Recent):  Temp: 97.7 °F (36.5 °C) (01/05/20 0721)  Pulse: 84 (01/05/20 0721)  Resp: 14 (01/05/20 0721)  BP: (!) 114/57 (01/05/20 0721)  SpO2: 100 % (01/05/20 0721) Vital Signs (24h Range):  Temp:  [97.7 °F (36.5 °C)-99.1 °F (37.3 °C)] 97.7 °F (36.5 °C)  Pulse:  [] 84  Resp:  [14-18] 14  SpO2:  [98 %-100 %] 100 %  BP: (111-128)/(57-80) 114/57     Weight: (need bed weight)  Body mass index is 42.42 kg/m².    FHT:  Positive by doppler    Physical Exam:   Constitutional: She is oriented to person, place, and time. She appears well-developed and well-nourished. No distress.    HENT:   Head: Normocephalic and atraumatic.     Neck: Neck supple.    Cardiovascular: Normal rate and regular rhythm.     Pulmonary/Chest: Effort normal.        Abdominal: Soft. She exhibits no distension. There is no tenderness.             Musculoskeletal: She exhibits no edema or tenderness.   No CVA tenderness       Neurological: She is alert and oriented to person, place, and time.    Skin: Skin is warm and dry.    Psychiatric: She has a normal mood and affect.            Significant Labs:  Lab Results   Component Value Date    GROUPTRH O POS 10/09/2019    HEPBSAG Negative 12/08/2019    STREPBCULT STREPTOCOCCUS AGALACTIAE (GROUP B) 07/18/2017       I have personallly reviewed all pertinent lab results from the last 24 hours.

## 2020-01-05 NOTE — H&P
[facility] -   Obstetrics  History & Physical    Patient Name: Juliet Pop  MRN: [id number]  Admission Date: 2020  Primary Care Provider: Primary Doctor No    Subjective:     Principal Problem:UTI (urinary tract infection) during pregnancy, second trimester    History of Present Illness:  24 y.o. female  at 24 weeks EGA, with c/o generalized body aches, chills, fever, nausea, and vomiting, for past 3 days.  Not tolerating oral fluids.      Obstetric HPI:  Patient reports No contractions, active fetal movement, No vaginal bleeding , No loss of fluid     This pregnancy has been complicated by previous  x 1.    OB History    Para Term  AB Living   2 1 1 0 0 1   SAB TAB Ectopic Multiple Live Births   0 0 0 0 1      # Outcome Date GA Lbr Goldy/2nd Weight Sex Delivery Anes PTL Lv   2 Current            1 Term 17 37w1d  2.835 kg (6 lb 4 oz) M CS-LTranv Gen N BETTINA      Complications: Failure to Progress in First Stage      Name: VANESA POP      Apgar1: 7  Apgar5: 9     Past Medical History:   Diagnosis Date    Abnormal Pap smear of cervix     Herpes simplex virus (HSV) infection     Pre-eclampsia in third trimester 2017     Past Surgical History:   Procedure Laterality Date     SECTION      MOUTH SURGERY         PTA Medications   Medication Sig    prenatal vits62/FA/om3/dha/epa (PRENATAL GUMMY ORAL) Take by mouth.    promethazine (PHENERGAN) 6.25 mg/5 mL syrup Take 20 mLs (25 mg total) by mouth every 6 (six) hours as needed for Nausea.    buPROPion (WELLBUTRIN SR) 150 MG TBSR 12 hr tablet Take 1 tablet (150 mg total) by mouth 2 (two) times daily. (Patient not taking: Reported on 2019)    cephALEXin (KEFLEX) 500 MG capsule Take 500 mg by mouth 2 (two) times daily.    valACYclovir (VALTREX) 1000 MG tablet Take 1 tablet (1,000 mg total) by mouth once daily.       Review of patient's allergies indicates:  No Known Allergies     Family  History     Problem Relation (Age of Onset)    Colon cancer Maternal Grandfather    Diabetes Maternal Grandmother    Heart disease Maternal Grandmother    Hypertension Maternal Grandmother        Tobacco Use    Smoking status: Never Smoker    Smokeless tobacco: Never Used   Substance and Sexual Activity    Alcohol use: Not Currently     Frequency: Monthly or less     Drinks per session: 1 or 2     Binge frequency: Never     Comment: social use     Drug use: Yes     Types: Marijuana     Comment: Last used in August 2019    Sexual activity: Yes     Partners: Male     Birth control/protection: None     Review of Systems   Constitutional: Positive for chills, fatigue and fever.   Respiratory: Negative for cough and shortness of breath.    Cardiovascular: Negative for chest pain.   Gastrointestinal: Positive for nausea and vomiting. Negative for abdominal pain.   Genitourinary: Negative for dysuria, pelvic pain and vaginal bleeding.   Musculoskeletal: Positive for myalgias.      Objective:     Vital Signs (Most Recent):  Temp: 97.7 °F (36.5 °C) (01/05/20 0721)  Pulse: 84 (01/05/20 0721)  Resp: 14 (01/05/20 0721)  BP: (!) 114/57 (01/05/20 0721)  SpO2: 100 % (01/05/20 0721) Vital Signs (24h Range):  Temp:  [97.7 °F (36.5 °C)-99.1 °F (37.3 °C)] 97.7 °F (36.5 °C)  Pulse:  [] 84  Resp:  [14-18] 14  SpO2:  [98 %-100 %] 100 %  BP: (111-128)/(57-80) 114/57     Weight: (need bed weight)  Body mass index is 42.42 kg/m².    FHT:  Positive by doppler    Physical Exam:   Constitutional: She is oriented to person, place, and time. She appears well-developed and well-nourished. No distress.    HENT:   Head: Normocephalic and atraumatic.     Neck: Neck supple.    Cardiovascular: Normal rate and regular rhythm.     Pulmonary/Chest: Effort normal.        Abdominal: Soft. She exhibits no distension. There is no tenderness.             Musculoskeletal: She exhibits no edema or tenderness.   No CVA tenderness       Neurological:  She is alert and oriented to person, place, and time.    Skin: Skin is warm and dry.    Psychiatric: She has a normal mood and affect.            Significant Labs:  Lab Results   Component Value Date    GROUPTRH O POS 10/09/2019    HEPBSAG Negative 2019    STREPBCULT STREPTOCOCCUS AGALACTIAE (GROUP B) 2017       I have personallly reviewed all pertinent lab results from the last 24 hours.    Assessment/Plan:     24 y.o. female  at 24w1d for:    * UTI (urinary tract infection) during pregnancy, second trimester  IV Rocephin for treatment of UTI, since patient cannot tolerate oral antibiotics.    Acute viral syndrome  Supportive care    Nausea and vomiting in pregnancy  Admission for IV fluids and anti-emetics.        Irene Car MD  Obstetrics  [facility] -

## 2020-01-05 NOTE — PLAN OF CARE
Patient remains free from falls and injuries. IV fluids provided. Pain managed by IV pain medication. Patient nauseated throughout shift. Nausea medication given per order. Will continue to monitor.

## 2020-01-05 NOTE — ED NOTES
"Patient identifiers verified and correct for [facility].    LOC: The patient is awake, alert and aware of environment with an appropriate affect, the patient is oriented x 3 and speaking appropriately.  APPEARANCE: Patient appears to be uncomfortable and tearful, but in no acute distress, patient is clean and well groomed, patient's clothing is properly fastened. LR infusing to L AC.   SKIN: The skin is warm and dry, color consistent with ethnicity, patient has normal skin turgor and moist mucus membranes, skin intact, no breakdown or bruising noted.  MUSCULOSKELETAL: Patient moving all extremities spontaneously.  RESPIRATORY: Airway is open and patent, respirations are spontaneous.  CARDIAC: Patient has a normal rate, no periphreal edema noted, capillary refill < 3 seconds.  ABDOMEN: WNL except patient is approx. 6 months pregnant - OB: Dr. Joiner, last seen 12/11/2019. Patient reports she is still nauseated and "throwing up spit" after 4 mg Zofran.     "

## 2020-01-06 ENCOUNTER — TELEMEDICINE (OUTPATIENT)
Dept: PSYCHIATRY | Facility: CLINIC | Age: 25
End: 2020-01-06

## 2020-01-06 PROBLEM — O99.019 ANEMIA COMPLICATING PREGNANCY: Status: ACTIVE | Noted: 2020-01-06

## 2020-01-06 LAB
ANION GAP SERPL CALC-SCNC: 9 MMOL/L (ref 8–16)
BASOPHILS # BLD AUTO: 0.01 K/UL (ref 0–0.2)
BASOPHILS NFR BLD: 0.2 % (ref 0–1.9)
BUN SERPL-MCNC: 6 MG/DL (ref 6–20)
CALCIUM SERPL-MCNC: 8.1 MG/DL (ref 8.7–10.5)
CHLORIDE SERPL-SCNC: 106 MMOL/L (ref 95–110)
CO2 SERPL-SCNC: 24 MMOL/L (ref 23–29)
CREAT SERPL-MCNC: 0.6 MG/DL (ref 0.5–1.4)
DIFFERENTIAL METHOD: ABNORMAL
EOSINOPHIL # BLD AUTO: 0.1 K/UL (ref 0–0.5)
EOSINOPHIL NFR BLD: 2.1 % (ref 0–8)
ERYTHROCYTE [DISTWIDTH] IN BLOOD BY AUTOMATED COUNT: 13 % (ref 11.5–14.5)
EST. GFR  (AFRICAN AMERICAN): >60 ML/MIN/1.73 M^2
EST. GFR  (NON AFRICAN AMERICAN): >60 ML/MIN/1.73 M^2
FIBRONECTIN FETAL SPEC QL: NEGATIVE
GLUCOSE SERPL-MCNC: 89 MG/DL (ref 70–110)
HCT VFR BLD AUTO: 25.4 % (ref 37–48.5)
HGB BLD-MCNC: 8.1 G/DL (ref 12–16)
IMM GRANULOCYTES # BLD AUTO: 0.01 K/UL (ref 0–0.04)
IMM GRANULOCYTES NFR BLD AUTO: 0.2 % (ref 0–0.5)
INFLUENZA A, MOLECULAR: NEGATIVE
INFLUENZA B, MOLECULAR: NEGATIVE
LYMPHOCYTES # BLD AUTO: 1.9 K/UL (ref 1–4.8)
LYMPHOCYTES NFR BLD: 33.8 % (ref 18–48)
MCH RBC QN AUTO: 28.9 PG (ref 27–31)
MCHC RBC AUTO-ENTMCNC: 31.9 G/DL (ref 32–36)
MCV RBC AUTO: 91 FL (ref 82–98)
MONOCYTES # BLD AUTO: 0.4 K/UL (ref 0.3–1)
MONOCYTES NFR BLD: 7.5 % (ref 4–15)
NEUTROPHILS # BLD AUTO: 3.2 K/UL (ref 1.8–7.7)
NEUTROPHILS NFR BLD: 56.2 % (ref 38–73)
NRBC BLD-RTO: 0 /100 WBC
PLATELET # BLD AUTO: 329 K/UL (ref 150–350)
PMV BLD AUTO: 9.3 FL (ref 9.2–12.9)
POTASSIUM SERPL-SCNC: 3.7 MMOL/L (ref 3.5–5.1)
RBC # BLD AUTO: 2.8 M/UL (ref 4–5.4)
SODIUM SERPL-SCNC: 139 MMOL/L (ref 136–145)
SPECIMEN SOURCE: NORMAL
WBC # BLD AUTO: 5.71 K/UL (ref 3.9–12.7)

## 2020-01-06 PROCEDURE — 96360 HYDRATION IV INFUSION INIT: CPT

## 2020-01-06 PROCEDURE — 96361 HYDRATE IV INFUSION ADD-ON: CPT

## 2020-01-06 PROCEDURE — 63600175 PHARM REV CODE 636 W HCPCS: Performed by: OBSTETRICS & GYNECOLOGY

## 2020-01-06 PROCEDURE — 25000003 PHARM REV CODE 250: Performed by: ADVANCED PRACTICE MIDWIFE

## 2020-01-06 PROCEDURE — 87502 INFLUENZA DNA AMP PROBE: CPT

## 2020-01-06 PROCEDURE — 11000001 HC ACUTE MED/SURG PRIVATE ROOM

## 2020-01-06 PROCEDURE — 25000003 PHARM REV CODE 250: Performed by: OBSTETRICS & GYNECOLOGY

## 2020-01-06 PROCEDURE — 82731 ASSAY OF FETAL FIBRONECTIN: CPT

## 2020-01-06 PROCEDURE — G0378 HOSPITAL OBSERVATION PER HR: HCPCS

## 2020-01-06 PROCEDURE — 96367 TX/PROPH/DG ADDL SEQ IV INF: CPT

## 2020-01-06 PROCEDURE — 85025 COMPLETE CBC W/AUTO DIFF WBC: CPT

## 2020-01-06 PROCEDURE — 80048 BASIC METABOLIC PNL TOTAL CA: CPT

## 2020-01-06 PROCEDURE — 36415 COLL VENOUS BLD VENIPUNCTURE: CPT

## 2020-01-06 RX ORDER — NITROFURANTOIN 25; 75 MG/1; MG/1
100 CAPSULE ORAL EVERY 12 HOURS
Status: DISCONTINUED | OUTPATIENT
Start: 2020-01-06 | End: 2020-01-07 | Stop reason: HOSPADM

## 2020-01-06 RX ADMIN — ONDANSETRON 8 MG: 8 TABLET, ORALLY DISINTEGRATING ORAL at 04:01

## 2020-01-06 RX ADMIN — PSEUDOEPHEDRINE HCL 30 MG: 30 TABLET, FILM COATED ORAL at 02:01

## 2020-01-06 RX ADMIN — PROMETHAZINE HYDROCHLORIDE 12.5 MG: 25 INJECTION INTRAMUSCULAR; INTRAVENOUS at 06:01

## 2020-01-06 RX ADMIN — DIPHENHYDRAMINE HYDROCHLORIDE 25 MG: 25 CAPSULE ORAL at 02:01

## 2020-01-06 RX ADMIN — HYDROCODONE BITARTRATE AND ACETAMINOPHEN 1 TABLET: 5; 325 TABLET ORAL at 02:01

## 2020-01-06 RX ADMIN — POTASSIUM CHLORIDE, DEXTROSE MONOHYDRATE AND SODIUM CHLORIDE: 150; 5; 900 INJECTION, SOLUTION INTRAVENOUS at 02:01

## 2020-01-06 RX ADMIN — HYDROCODONE BITARTRATE AND ACETAMINOPHEN 1 TABLET: 7.5; 325 TABLET ORAL at 08:01

## 2020-01-06 RX ADMIN — NITROFURANTOIN MONOHYDRATE/MACROCRYSTALLINE 100 MG: 25; 75 CAPSULE ORAL at 02:01

## 2020-01-06 RX ADMIN — ONDANSETRON 8 MG: 8 TABLET, ORALLY DISINTEGRATING ORAL at 08:01

## 2020-01-06 NOTE — NURSING
"@1300 patient rounding, I could tell the patient was upset. After asking what was wrong, the patient threw her phone stating she was "pissed"; pt. States she is having trouble with her family and that she is not sure how much longer she can do this. Patient is having housing issues. Social work called.  Social work saw patient, psych consult put in by PATRICE  "

## 2020-01-06 NOTE — SUBJECTIVE & OBJECTIVE
Hospital course: Patient has been afebrile and on IV antibiotics since admission.  Still reports cramping and intermittent pelvic pain, along with back pain.  Tolerating PO but reports nausea, no emesis.          Objective:     Vital Signs (Most Recent):  Temp: 98.2 °F (36.8 °C) (01/06/20 0723)  Pulse: 85 (01/06/20 0723)  Resp: 20 (01/06/20 0723)  BP: 118/64 (01/06/20 0723)  SpO2: 100 % (01/06/20 0723) Vital Signs (24h Range):  Temp:  [97.5 °F (36.4 °C)-98.6 °F (37 °C)] 98.2 °F (36.8 °C)  Pulse:  [81-91] 85  Resp:  [16-20] 20  SpO2:  [98 %-100 %] 100 %  BP: (100-135)/(52-79) 118/64     Weight: 126 kg (277 lb 12.5 oz)  Body mass index is 44.83 kg/m².      Intake/Output Summary (Last 24 hours) at 1/6/2020 0942  Last data filed at 1/6/2020 0520  Gross per 24 hour   Intake 2383.33 ml   Output --   Net 2383.33 ml       Significant Labs:  Lab Results   Component Value Date    GROUPTRH O POS 10/09/2019    HEPBSAG Negative 12/08/2019    STREPBCULT STREPTOCOCCUS AGALACTIAE (GROUP B) 07/18/2017     Recent Labs   Lab 01/06/20  0202   HGB 8.1*   HCT 25.4*       I have personallly reviewed all pertinent lab results from the last 24 hours.  Recent Lab Results       01/06/20  0202        Anion Gap 9     Baso # 0.01     Basophil% 0.2     BUN, Bld 6     Calcium 8.1     Chloride 106     CO2 24     Creatinine 0.6     Differential Method Automated     eGFR if  >60     eGFR if non  >60  Comment:  Calculation used to obtain the estimated glomerular filtration  rate (eGFR) is the CKD-EPI equation.        Eos # 0.1     Eosinophil% 2.1     Glucose 89     Gran # (ANC) 3.2     Gran% 56.2     Hematocrit 25.4     Hemoglobin 8.1     Immature Grans (Abs) 0.01  Comment:  Mild elevation in immature granulocytes is non specific and   can be seen in a variety of conditions including stress response,   acute inflammation, trauma and pregnancy. Correlation with other   laboratory and clinical findings is essential.        Immature Granulocytes 0.2     Lymph # 1.9     Lymph% 33.8     MCH 28.9     MCHC 31.9     MCV 91     Mono # 0.4     Mono% 7.5     MPV 9.3     nRBC 0     Platelets 329     Potassium 3.7     RBC 2.80     RDW 13.0     Sodium 139     WBC 5.71           Physical Exam:   Constitutional: She is oriented to person, place, and time. No distress.   Appears uncomfortable         Cardiovascular: Regular rhythm.     Pulmonary/Chest: Effort normal and breath sounds normal.          Genitourinary:   Genitourinary Comments: No CVA tenderness  Low back and mid back pain                 Neurological: She is alert and oriented to person, place, and time. She has normal reflexes.    Skin: Skin is warm and dry.

## 2020-01-06 NOTE — PROGRESS NOTES
[facility] -   Obstetrics  Postpartum Progress Note    Patient Name: Juliet Jalloh  MRN: [id number]  Admission Date: 1/4/2020  Hospital Length of Stay: 0 days  Attending Physician: Irene Car, *  Primary Care Provider: Primary Doctor No    Subjective:     Principal Problem:UTI (urinary tract infection) during pregnancy, second trimester    Hospital course: Patient has been afebrile and on IV antibiotics since admission.  Still reports cramping and intermittent pelvic pain, along with back pain.  Tolerating PO but reports nausea, no emesis.          Objective:     Vital Signs (Most Recent):  Temp: 98.2 °F (36.8 °C) (01/06/20 0723)  Pulse: 85 (01/06/20 0723)  Resp: 20 (01/06/20 0723)  BP: 118/64 (01/06/20 0723)  SpO2: 100 % (01/06/20 0723) Vital Signs (24h Range):  Temp:  [97.5 °F (36.4 °C)-98.6 °F (37 °C)] 98.2 °F (36.8 °C)  Pulse:  [81-91] 85  Resp:  [16-20] 20  SpO2:  [98 %-100 %] 100 %  BP: (100-135)/(52-79) 118/64     Weight: 126 kg (277 lb 12.5 oz)  Body mass index is 44.83 kg/m².      Intake/Output Summary (Last 24 hours) at 1/6/2020 0942  Last data filed at 1/6/2020 0520  Gross per 24 hour   Intake 2383.33 ml   Output --   Net 2383.33 ml       Significant Labs:  Lab Results   Component Value Date    GROUPTRH O POS 10/09/2019    HEPBSAG Negative 12/08/2019    STREPBCULT STREPTOCOCCUS AGALACTIAE (GROUP B) 07/18/2017     Recent Labs   Lab 01/06/20  0202   HGB 8.1*   HCT 25.4*       I have personallly reviewed all pertinent lab results from the last 24 hours.  Recent Lab Results       01/06/20  0202        Anion Gap 9     Baso # 0.01     Basophil% 0.2     BUN, Bld 6     Calcium 8.1     Chloride 106     CO2 24     Creatinine 0.6     Differential Method Automated     eGFR if  >60     eGFR if non  >60  Comment:  Calculation used to obtain the estimated glomerular filtration  rate (eGFR) is the CKD-EPI equation.        Eos # 0.1     Eosinophil% 2.1      Glucose 89     Gran # (ANC) 3.2     Gran% 56.2     Hematocrit 25.4     Hemoglobin 8.1     Immature Grans (Abs) 0.01  Comment:  Mild elevation in immature granulocytes is non specific and   can be seen in a variety of conditions including stress response,   acute inflammation, trauma and pregnancy. Correlation with other   laboratory and clinical findings is essential.       Immature Granulocytes 0.2     Lymph # 1.9     Lymph% 33.8     MCH 28.9     MCHC 31.9     MCV 91     Mono # 0.4     Mono% 7.5     MPV 9.3     nRBC 0     Platelets 329     Potassium 3.7     RBC 2.80     RDW 13.0     Sodium 139     WBC 5.71           Physical Exam:   Constitutional: She is oriented to person, place, and time. No distress.   Appears uncomfortable         Cardiovascular: Regular rhythm.     Pulmonary/Chest: Effort normal and breath sounds normal.          Genitourinary:   Genitourinary Comments: No CVA tenderness  Low back and mid back pain                 Neurological: She is alert and oriented to person, place, and time. She has normal reflexes.    Skin: Skin is warm and dry.        Assessment/Plan:     24 y.o. female  for:    * UTI (urinary tract infection) during pregnancy, second trimester  IV Rocephin for treatment of UTI  Continue until discharge when  PO antibiotics can be tolerated    Acute viral syndrome  Supportive care    Nausea and vomiting in pregnancy  Admission for IV fluids and anti-emetics.  Patient tolerating regular diet with nausea         Disposition: As patient meets milestones, will plan to discharge after rule out labor.    Noni Orourke PA-C  Obstetrics  [facility] -

## 2020-01-06 NOTE — HOSPITAL COURSE
Patient has been afebrile and on IV antibiotics since admission.  Still reports cramping and intermittent pelvic pain, along with back pain.  Tolerating PO but reports nausea, no emesis.      1/6/20 7:45 PM reviewed psychiatrist Liss recommendations for PEC, no medications at this time, with pt. Questions answered. Pt understands and desires to comply with these recommendations to get assessed for psychiatric admission in the morning.    01/07/2020  Patient reports continued nausea and no appetite.  Says she vomited early this morning around 1 am, but has not had symptoms of N/V since she went back to sleep.   1/7/2020--patient is medically cleared    01/07/2020 @ 1450: Carlyn arrived to [facility] to transport patient to [facility] in [facility].  involved in placement. Patient agrees and signs all consents.

## 2020-01-06 NOTE — PLAN OF CARE
Problem: Adult Inpatient Plan of Care  Goal: Plan of Care Review  Outcome: Ongoing, Progressing     POC reviewed, including indications and possible side effects of administered medications. Patient verbalized understanding and teach back. No adverse reactions noted. Patient c/o generalized pain and congestion. Administered medications per order. Scope patch on L ear. VSS. No s/s of acute distress noted. Patient remains free of falls and injuries during shift. Will continue to monitor.     Chart check complete.
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
## 2020-01-06 NOTE — PROGRESS NOTES
Entered patients room approximately 1  Hour ago due to hearing excessive crying.  Patient states she is not getting along with her family.  She is pregnant with her second child and has a 3yo who is with that fathers mother @ this time and she says the child is safe.  Patient states she has not paid her rent in a couple of months and is afraid of being homeless with 2 children.  Patient states she is tired of the way her life is going and is not sure how much longer she can do this.  Per  she does not have a plan to harm herself.  Medically the patient could be managed outpatient.  A psych consult has been placed.

## 2020-01-06 NOTE — PLAN OF CARE
"Sw completed discharge planning assessment with pt in room ALD 8. Pt was tearful but easily engaged. Education on role of  provided. Emotional support provided throughout assessment.     Pt stated she is not getting along with her family. The FOB is recently around again and helping pt with cost of living. Pt is pregnant with her second child, a girl, and has a 2 year old boy who is with his paternal grandmother at this time. Pt stated the child was safe. Pt stated she has not paid her rent in a couple of months and is afraid of being homeless with two children. Pt stated she is tired of living this way. Sw asked pt, "do you have a plan to harm yourself?" Pt responded with, "no." The midwife put in a psych consult for pt. Sw gave pt. [facility] information, [facility] information, [facility] information, and [facility] resources.    Sw remains available for additional emotional support or resources during hospital stay.    Mitzy Cash, DANGELO, CSW    [facility]    Phone: [phone]    [email or web address]             "

## 2020-01-07 VITALS
OXYGEN SATURATION: 97 % | HEART RATE: 88 BPM | TEMPERATURE: 99 F | WEIGHT: 277.75 LBS | HEIGHT: 66 IN | DIASTOLIC BLOOD PRESSURE: 85 MMHG | BODY MASS INDEX: 44.64 KG/M2 | SYSTOLIC BLOOD PRESSURE: 152 MMHG | RESPIRATION RATE: 18 BRPM

## 2020-01-07 PROBLEM — F32.A DEPRESSION WITH SUICIDAL IDEATION: Status: ACTIVE | Noted: 2020-01-07

## 2020-01-07 PROBLEM — R45.851 DEPRESSION WITH SUICIDAL IDEATION: Status: ACTIVE | Noted: 2020-01-07

## 2020-01-07 PROBLEM — B34.9 ACUTE VIRAL SYNDROME: Status: RESOLVED | Noted: 2020-01-05 | Resolved: 2020-01-07

## 2020-01-07 LAB
AMPHET+METHAMPHET UR QL: NEGATIVE
B-HCG UR QL: POSITIVE
BACTERIA UR CULT: ABNORMAL
BARBITURATES UR QL SCN>200 NG/ML: NEGATIVE
BENZODIAZ UR QL SCN>200 NG/ML: NEGATIVE
BZE UR QL SCN: NEGATIVE
CANNABINOIDS UR QL SCN: NORMAL
CREAT UR-MCNC: 146.6 MG/DL (ref 15–325)
ETHANOL SERPL-MCNC: <10 MG/DL
HCG INTACT+B SERPL-ACNC: NORMAL MIU/ML
METHADONE UR QL SCN>300 NG/ML: NEGATIVE
OPIATES UR QL SCN: NORMAL
PCP UR QL SCN>25 NG/ML: NEGATIVE
TOXICOLOGY INFORMATION: NORMAL

## 2020-01-07 PROCEDURE — 81025 URINE PREGNANCY TEST: CPT

## 2020-01-07 PROCEDURE — 25000003 PHARM REV CODE 250: Performed by: PHYSICIAN ASSISTANT

## 2020-01-07 PROCEDURE — 36415 COLL VENOUS BLD VENIPUNCTURE: CPT

## 2020-01-07 PROCEDURE — 80320 DRUG SCREEN QUANTALCOHOLS: CPT

## 2020-01-07 PROCEDURE — 80307 DRUG TEST PRSMV CHEM ANLYZR: CPT

## 2020-01-07 PROCEDURE — 25000003 PHARM REV CODE 250: Performed by: OBSTETRICS & GYNECOLOGY

## 2020-01-07 PROCEDURE — 99238 HOSP IP/OBS DSCHRG MGMT 30/<: CPT | Mod: TH,,, | Performed by: ADVANCED PRACTICE MIDWIFE

## 2020-01-07 PROCEDURE — 99238 PR HOSPITAL DISCHARGE DAY,<30 MIN: ICD-10-PCS | Mod: TH,,, | Performed by: ADVANCED PRACTICE MIDWIFE

## 2020-01-07 PROCEDURE — 11000001 HC ACUTE MED/SURG PRIVATE ROOM

## 2020-01-07 PROCEDURE — 25000003 PHARM REV CODE 250: Performed by: ADVANCED PRACTICE MIDWIFE

## 2020-01-07 PROCEDURE — 84702 CHORIONIC GONADOTROPIN TEST: CPT

## 2020-01-07 RX ORDER — AMOXICILLIN 250 MG
1 CAPSULE ORAL DAILY
Status: DISCONTINUED | OUTPATIENT
Start: 2020-01-07 | End: 2020-01-07 | Stop reason: HOSPADM

## 2020-01-07 RX ORDER — FERROUS SULFATE 325(65) MG
325 TABLET, DELAYED RELEASE (ENTERIC COATED) ORAL DAILY
Status: DISCONTINUED | OUTPATIENT
Start: 2020-01-07 | End: 2020-01-07 | Stop reason: HOSPADM

## 2020-01-07 RX ORDER — ONDANSETRON 8 MG/1
8 TABLET, ORALLY DISINTEGRATING ORAL EVERY 8 HOURS
Status: DISCONTINUED | OUTPATIENT
Start: 2020-01-07 | End: 2020-01-07 | Stop reason: HOSPADM

## 2020-01-07 RX ORDER — BUTALBITAL, ACETAMINOPHEN AND CAFFEINE 50; 325; 40 MG/1; MG/1; MG/1
1 TABLET ORAL ONCE
Status: COMPLETED | OUTPATIENT
Start: 2020-01-07 | End: 2020-01-07

## 2020-01-07 RX ORDER — NITROFURANTOIN 25; 75 MG/1; MG/1
100 CAPSULE ORAL EVERY 12 HOURS
Qty: 5 CAPSULE | Refills: 0 | Status: SHIPPED | OUTPATIENT
Start: 2020-01-07 | End: 2020-01-24

## 2020-01-07 RX ADMIN — HYDROCODONE BITARTRATE AND ACETAMINOPHEN 1 TABLET: 5; 325 TABLET ORAL at 01:01

## 2020-01-07 RX ADMIN — FERROUS SULFATE TAB EC 325 MG (65 MG FE EQUIVALENT) 325 MG: 325 (65 FE) TABLET DELAYED RESPONSE at 09:01

## 2020-01-07 RX ADMIN — NITROFURANTOIN MONOHYDRATE/MACROCRYSTALLINE 100 MG: 25; 75 CAPSULE ORAL at 01:01

## 2020-01-07 RX ADMIN — NITROFURANTOIN MONOHYDRATE/MACROCRYSTALLINE 100 MG: 25; 75 CAPSULE ORAL at 09:01

## 2020-01-07 RX ADMIN — ONDANSETRON 8 MG: 8 TABLET, ORALLY DISINTEGRATING ORAL at 12:01

## 2020-01-07 RX ADMIN — ONDANSETRON 8 MG: 8 TABLET, ORALLY DISINTEGRATING ORAL at 01:01

## 2020-01-07 RX ADMIN — DOCUSATE SODIUM AND SENNOSIDES 1 TABLET: 8.6; 5 TABLET, FILM COATED ORAL at 09:01

## 2020-01-07 RX ADMIN — BUTALBITAL, ACETAMINOPHEN, AND CAFFEINE 1 TABLET: 50; 325; 40 TABLET ORAL at 01:01

## 2020-01-07 NOTE — DISCHARGE SUMMARY
[facility] -   Obstetrics  Discharge Summary      Patient Name: Juliet Jalloh  MRN: [id number]  Admission Date: 2020  Hospital Length of Stay: 0 days  Discharge Date and Time: No discharge date for patient encounter.  Attending Physician: Irene Car, *   Discharging Provider: Carley Soto CNM   Primary Care Provider: Primary Doctor No    HPI: 24 y.o. female  at 24 weeks EGA, with c/o generalized body aches, chills, fever, nausea, and vomiting, for past 3 days.  Not tolerating oral fluids.      * No surgery found *     Hospital Course:   Patient has been afebrile and on IV antibiotics since admission.  Still reports cramping and intermittent pelvic pain, along with back pain.  Tolerating PO but reports nausea, no emesis.      20 7:45 PM reviewed psychiatrist Liss recommendations for PEC, no medications at this time, with pt. Questions answered. Pt understands and desires to comply with these recommendations to get assessed for psychiatric admission in the morning.    2020  Patient reports continued nausea and no appetite.  Says she vomited early this morning around 1 am, but has not had symptoms of N/V since she went back to sleep.   2020--patient is medically cleared    2020 @ 1450: Carlyn arrived to [facility] to transport patient to [facility].  involved in placement. Patient agrees and signs all consents.      Consults (From admission, onward)        Status Ordering Provider     Inpatient consult to Telemedicine - Psych  Once     Provider:  Josué Shipman MD    Acknowledged ADELAIDA ALEJANDRO          Final Active Diagnoses:    Diagnosis Date Noted POA    PRINCIPAL PROBLEM:  UTI (urinary tract infection) during pregnancy, second trimester [O23.42] 2020 Yes    Anemia complicating pregnancy [O99.019] 2020 Yes    Nausea and vomiting in pregnancy [O21.9] 2019 Yes    MDD (major depressive disorder)  [F32.9] 10/04/2018 Yes      Problems Resolved During this Admission:    Diagnosis Date Noted Date Resolved POA    Acute viral syndrome [B34.9] 01/05/2020 01/07/2020 Yes        Labs: All labs within the past 24 hours have been reviewed      Immunizations     Date Immunization Status Dose Route/Site Given by    12/08/10 0000 Influenza - Intranasal - Trivalent Given       09/07/11 0000 Hepatitis B, Pediatric/Adolescent Given  Intramuscular/Left arm     10/04/07 0000 Hepatitis B, Pediatric/Adolescent Given  Intramuscular/Left arm     11/08/07 0000 Hepatitis B, Pediatric/Adolescent Given  Intramuscular/Left arm     09/07/11 0000 HPV Quadrivalent Given  Intramuscular/Right arm     10/04/07 0000 HPV Quadrivalent Given  Intramuscular/Right arm     10/04/07 0000 IPV Given  Intramuscular/Left arm     09/07/11 0000 IPV Given  Intramuscular/Left arm     09/07/11 0000 Meningococcal Conjugate (MCV4P) Given  Intramuscular/Right arm     10/04/07 0000 MMR Given  Subcutaneous/Right arm     11/08/07 0000 MMR Given  Subcutaneous/Left arm     10/04/07 0000 Tdap Given  Intramuscular/Left arm           This patient has no babies on file.  Pending Diagnostic Studies:     None          Discharged Condition: stable    Disposition: Psychiatric Hospital    Follow Up:  Follow-up Information     Please follow up.    Why:  keep next PNV               Patient Instructions:   No discharge procedures on file.  Medications:  Current Discharge Medication List      START taking these medications    Details   nitrofurantoin, macrocrystal-monohydrate, (MACROBID) 100 MG capsule Take 1 capsule (100 mg total) by mouth every 12 (twelve) hours.  Qty: 5 capsule, Refills: 0         CONTINUE these medications which have NOT CHANGED    Details   prenatal vits62/FA/om3/dha/epa (PRENATAL GUMMY ORAL) Take by mouth.      promethazine (PHENERGAN) 6.25 mg/5 mL syrup Take 20 mLs (25 mg total) by mouth every 6 (six) hours as needed for Nausea.  Qty: 473 mL, Refills: 3     Associated Diagnoses: Nausea and vomiting during pregnancy      buPROPion (WELLBUTRIN SR) 150 MG TBSR 12 hr tablet Take 1 tablet (150 mg total) by mouth 2 (two) times daily.  Qty: 60 tablet, Refills: 11      cephALEXin (KEFLEX) 500 MG capsule Take 500 mg by mouth 2 (two) times daily.  Refills: 0      valACYclovir (VALTREX) 1000 MG tablet Take 1 tablet (1,000 mg total) by mouth once daily.  Qty: 30 tablet, Refills: 11             Carley Soto CNM  Obstetrics  [facility]

## 2020-01-07 NOTE — SUBJECTIVE & OBJECTIVE
Obstetric HPI:  Patient reports None contractions, active fetal movement, absent vaginal bleeding , absent loss of fluid      Objective:     Vital Signs (Most Recent):  Temp: 98.2 °F (36.8 °C) (01/06/20 0723)  Pulse: 81 (01/06/20 2011)  Resp: 20 (01/06/20 0723)  BP: 118/64 (01/06/20 0723)  SpO2: 97 % (01/06/20 2011) Vital Signs (24h Range):  Pulse:  [78-85] 81  SpO2:  [97 %-100 %] 97 %     Weight: 126 kg (277 lb 12.5 oz)  Body mass index is 44.83 kg/m².        Intake/Output Summary (Last 24 hours) at 1/7/2020 0745  Last data filed at 1/6/2020 0800  Gross per 24 hour   Intake 120 ml   Output --   Net 120 ml       Significant Labs:  Recent Lab Results       01/06/20  1100   01/06/20  1045        Influenza A, Molecular Negative       Influenza B, Molecular Negative       Fetal Fibronectin   Negative     Flu A & B Source Nasal swab           Physical Exam:   Constitutional: She is oriented to person, place, and time. She appears well-developed and well-nourished. No distress.    HENT:   Head: Normocephalic and atraumatic.     Neck: Neck supple.    Cardiovascular: Normal rate and regular rhythm.     Pulmonary/Chest: Effort normal.        Abdominal: Soft. She exhibits no distension. There is no tenderness.             Musculoskeletal: She exhibits no edema or tenderness.   No CVA tenderness       Neurological: She is alert and oriented to person, place, and time.    Skin: Skin is warm and dry.    Psychiatric: See Telemedicine note from Dr. Shipman for complete evaluation- patient is slow to respond and speaking under blanket.

## 2020-01-07 NOTE — PROGRESS NOTES
[facility] -   Obstetrics  Antepartum Progress Note    Patient Name: Juliet Jalloh  MRN: [id number]  Admission Date: 2020  Hospital Length of Stay: 0 days  Attending Physician: Irene Car, *  Primary Care Provider: Primary Doctor No    Subjective:     Principal Problem:UTI (urinary tract infection) during pregnancy, second trimester    HPI:  24 y.o. female  at 24 weeks EGA, with c/o generalized body aches, chills, fever, nausea, and vomiting, for past 3 days.  Not tolerating oral fluids.      Hospital Course:  Patient has been afebrile and on IV antibiotics since admission.  Still reports cramping and intermittent pelvic pain, along with back pain.  Tolerating PO but reports nausea, no emesis.      20 7:45 PM reviewed psychiatrist Liss recommendations for PEC, no medications at this time, with pt. Questions answered. Pt understands and desires to comply with these recommendations to get assessed for psychiatric admission in the morning.    2020  Patient reports continued nausea and no appetite.  Says she vomited early this morning around 1 am, but has not had symptoms of N/V since she went back to sleep.   2020--patient is medically cleared    No new subjective & objective note has been filed under this hospital service since the last note was generated.    Assessment/Plan:     24 y.o. female  at 24w3d for:    * UTI (urinary tract infection) during pregnancy, second trimester  Patient tolerating PO antibiotics.  Continue Macrobid BID for UTI. No symptoms or complaints reported.   Patient is medically cleared    Anemia complicating pregnancy  Scheduled iron supplementation added     MDD (major depressive disorder)  Assessed by psychiatry, Dr Josué Shipman and diagnosed as gravely disabled, with concern for suicidal intention, and recommended PEC.psychiatric institution placement awaited in the morning.    2020  This morning, the patient says she  does not want to go to a psych facility- explained to the patient that she has been placed under PEC and we were not at liberty to go against this decision.  Will follow up with pscyh and SW if needed.     Nausea and vomiting in pregnancy  Zofran PO scheduled for nausea.  Patient reports bouts of emesis but continues to tolerate PO medications and she is on regular diet, although she says she has no appetite.  Encouraged intake of fluids and staying hydrated.    Constipation from zofran will be treated with scheduled stool softener daily.    Scopolamine patch in place since Sunday, change at 72 hours.            Poonam Galdamez MD  Obstetrics  [facility]

## 2020-01-07 NOTE — ASSESSMENT & PLAN NOTE
Patient tolerating PO antibiotics.  Continue Macrobid BID for UTI. No symptoms or complaints reported.

## 2020-01-07 NOTE — DISCHARGE INSTRUCTIONS

## 2020-01-07 NOTE — PROGRESS NOTES
"CNM and SW notified of patient request to speak telepsych again because she staes she isnt going to any other facility. States she never said she was homeless but said she hadnt paid her rent and when they evicted her her now boyfriend will pay for them to live somewhere and she mean't that she didn't want to be in [place] and didn't mean not in this world....."I'm scared of death she says" Also spoke with PATRICE regarding headache and request for FIoricet because Percocet makes her sleepy.   "

## 2020-01-07 NOTE — PROGRESS NOTES
Patient transferred to [facility] via [facility]..patient to stretcher calmly and cooperatively.

## 2020-01-07 NOTE — PROGRESS NOTES
[facility] -   Obstetrics  Antepartum Progress Note    Patient Name: Juliet Jalloh  MRN: [id number]  Admission Date: 2020  Hospital Length of Stay: 0 days  Attending Physician: Irene Car, *  Primary Care Provider: Primary Doctor No    Subjective:     Principal Problem:UTI (urinary tract infection) during pregnancy, second trimester    HPI:  24 y.o. female  at 24 weeks EGA, with c/o generalized body aches, chills, fever, nausea, and vomiting, for past 3 days.  Not tolerating oral fluids.      Hospital Course:  Patient has been afebrile and on IV antibiotics since admission.  Still reports cramping and intermittent pelvic pain, along with back pain.  Tolerating PO but reports nausea, no emesis.      20 7:45 PM reviewed psychiatrist Liss recommendations for PEC, no medications at this time, with pt. Questions answered. Pt understands and desires to comply with these recommendations to get assessed for psychiatric admission in the morning.    2020  Patient reports continued nausea and no appetite.  Says she vomited early this morning around 1 am, but has not had symptoms of N/V since she went back to sleep.     Obstetric HPI:  Patient reports None contractions, active fetal movement, absent vaginal bleeding , absent loss of fluid      Objective:     Vital Signs (Most Recent):  Temp: 98.2 °F (36.8 °C) (20)  Pulse: 81 (20)  Resp: 20 (20)  BP: 118/64 (20)  SpO2: 97 % (20) Vital Signs (24h Range):  Pulse:  [78-85] 81  SpO2:  [97 %-100 %] 97 %     Weight: 126 kg (277 lb 12.5 oz)  Body mass index is 44.83 kg/m².        Intake/Output Summary (Last 24 hours) at 2020 0745  Last data filed at 2020 0800  Gross per 24 hour   Intake 120 ml   Output --   Net 120 ml       Significant Labs:  Recent Lab Results       20  1100   20  1045        Influenza A, Molecular Negative       Influenza B, Molecular  Negative       Fetal Fibronectin   Negative     Flu A & B Source Nasal swab           Physical Exam:   Constitutional: She is oriented to person, place, and time. She appears well-developed and well-nourished. No distress.    HENT:   Head: Normocephalic and atraumatic.     Neck: Neck supple.    Cardiovascular: Normal rate and regular rhythm.     Pulmonary/Chest: Effort normal.        Abdominal: Soft. She exhibits no distension. There is no tenderness.             Musculoskeletal: She exhibits no edema or tenderness.   No CVA tenderness       Neurological: She is alert and oriented to person, place, and time.    Skin: Skin is warm and dry.    Psychiatric: See Telemedicine note from Dr. Shipman for complete evaluation- patient is slow to respond and speaking under blanket.                   Assessment/Plan:     24 y.o. female  at 24w3d for:    * UTI (urinary tract infection) during pregnancy, second trimester  Patient tolerating PO antibiotics.  Continue Macrobid BID for UTI. No symptoms or complaints reported.     Anemia complicating pregnancy  Scheduled iron supplementation added     Acute viral syndrome  Supportive care    MDD (major depressive disorder)  Assessed by psychiatry, Dr Josué Shipman and diagnosed as gravely disabled, with concern for suicidal intention, and recommended PEC.psychiatric institution placement awaited in the morning.    2020  This morning, the patient says she does not want to go to a psych facility- explained to the patient that she has been placed under PEC and we were not at liberty to go against this decision.  Awaiting placement.  Social work involved.  Nausea and vomiting in pregnancy  Zofran PO scheduled for nausea.  Patient reports bouts of emesis but continues to tolerate PO medications and she is on regular diet, although she says she has no appetite.  Encouraged intake of fluids and staying hydrated.    Constipation from zofran will be treated with scheduled stool  softener daily.    Scopolamine patch in place since Sunday, change at 72 hours.            Noni Orourke PA-C  Obstetrics  [facility]

## 2020-01-07 NOTE — ASSESSMENT & PLAN NOTE
Patient tolerating PO antibiotics.  Continue Macrobid BID for UTI. No symptoms or complaints reported.   Patient is medically cleared

## 2020-01-07 NOTE — PROGRESS NOTES
[facility] -   Obstetrics  Antepartum Progress Note    Patient Name: Juliet Jalloh  MRN: [id number]  Admission Date: 2020  Hospital Length of Stay: 0 days  Attending Physician: Irene Car, *  Primary Care Provider: Primary Doctor No    Subjective:     Principal Problem:UTI (urinary tract infection) during pregnancy, second trimester    HPI:  24 y.o. female  at 24 weeks EGA, with c/o generalized body aches, chills, fever, nausea, and vomiting, for past 3 days.  Not tolerating oral fluids.      Hospital Course:  Patient has been afebrile and on IV antibiotics since admission.  Still reports cramping and intermittent pelvic pain, along with back pain.  Tolerating PO but reports nausea, no emesis.      20 7:45 PM reviewed psychiatrist Liss recommendations for PEC, no medications at this time, with pt. Questions answered. Pt understands and desires to comply with these recommendations to get assessed for psychiatric admission in the morning.    2020  Patient reports continued nausea and no appetite.  Says she vomited early this morning around 1 am, but has not had symptoms of N/V since she went back to sleep.   2020--patient is medically cleared    No new subjective & objective note has been filed under this hospital service since the last note was generated.    Assessment/Plan:     24 y.o. female  at 24w3d for:    * UTI (urinary tract infection) during pregnancy, second trimester  Patient tolerating PO antibiotics.  Continue Macrobid BID for UTI. No symptoms or complaints reported.   Patient is medically cleared    Anemia complicating pregnancy  Scheduled iron supplementation added     MDD (major depressive disorder)  Assessed by psychiatry, Dr Josué Shipman and diagnosed as gravely disabled, with concern for suicidal intention, and recommended PEC.psychiatric institution placement awaited in the morning.    2020  This morning, the patient says she  does not want to go to a psych facility- explained to the patient that she has been placed under PEC and we were not at liberty to go against this decision.  Will follow up with pscyh and SW if needed.     Nausea and vomiting in pregnancy  Zofran PO scheduled for nausea.  Patient reports bouts of emesis but continues to tolerate PO medications and she is on regular diet, although she says she has no appetite.  Encouraged intake of fluids and staying hydrated.    Constipation from zofran will be treated with scheduled stool softener daily.    Scopolamine patch in place since Sunday, change at 72 hours.            Poonam Galdamez MD  Obstetrics  [facility]

## 2020-01-07 NOTE — ASSESSMENT & PLAN NOTE
Zofran PO scheduled for nausea.  Patient reports bouts of emesis but continues to tolerate PO medications and she is on regular diet, although she says she has no appetite.  Encouraged intake of fluids and staying hydrated.    Constipation from zofran will be treated with scheduled stool softener daily.    Scopolamine patch in place since Sunday, change at 72 hours.

## 2020-01-07 NOTE — ASSESSMENT & PLAN NOTE
Assessed by psychiatry, Dr Josué Shipman and diagnosed as gravely disabled, with concern for suicidal intention, and recommended PEC.psychiatric institution placement awaited in the morning.    01/07/2020  This morning, the patient says she does not want to go to a psych facility- explained to the patient that she has been placed under PEC and we were not at liberty to go against this decision.  [facility] and SW if needed.

## 2020-01-08 ENCOUNTER — PATIENT MESSAGE (OUTPATIENT)
Dept: OBSTETRICS AND GYNECOLOGY | Facility: CLINIC | Age: 25
End: 2020-01-08

## 2020-01-08 PROBLEM — K59.00 CONSTIPATION: Status: ACTIVE | Noted: 2020-01-08

## 2020-01-08 PROBLEM — Z3A.24 24 WEEKS GESTATION OF PREGNANCY: Status: ACTIVE | Noted: 2020-01-08

## 2020-01-14 ENCOUNTER — TELEPHONE (OUTPATIENT)
Dept: OBSTETRICS AND GYNECOLOGY | Facility: CLINIC | Age: 25
End: 2020-01-14

## 2020-01-14 ENCOUNTER — PATIENT MESSAGE (OUTPATIENT)
Dept: OBSTETRICS AND GYNECOLOGY | Facility: CLINIC | Age: 25
End: 2020-01-14

## 2020-01-14 NOTE — TELEPHONE ENCOUNTER
Pt was transferred and inquiring about her appointment times. Pt stated that she didn't understand why her appointments were 2 hours apart. Advised pt that she will be seen after her u/s, but those were the only available times. Pt verbalized understanding.

## 2020-01-22 ENCOUNTER — TELEPHONE (OUTPATIENT)
Dept: OBSTETRICS AND GYNECOLOGY | Facility: CLINIC | Age: 25
End: 2020-01-22

## 2020-01-22 NOTE — TELEPHONE ENCOUNTER
Returned pt call in regards to rescheduling missed appts. Rescheduled pt u/s on 1/23/20 at 11:00 am and OB visit at 11:00 am with Lizbeth Thomson. Pt verbalized understanding.

## 2020-01-22 NOTE — TELEPHONE ENCOUNTER
----- Message from Rianna Garcia sent at 1/22/2020 11:14 AM CST -----  Contact: pt  Pt is requesting a call back from the nurse in regards to pt would like to schedule ultrasound appt. Please call back at [phone] .

## 2020-01-24 ENCOUNTER — ROUTINE PRENATAL (OUTPATIENT)
Dept: OBSTETRICS AND GYNECOLOGY | Facility: CLINIC | Age: 25
End: 2020-01-24
Payer: MEDICAID

## 2020-01-24 ENCOUNTER — PROCEDURE VISIT (OUTPATIENT)
Dept: OBSTETRICS AND GYNECOLOGY | Facility: CLINIC | Age: 25
End: 2020-01-24
Payer: MEDICAID

## 2020-01-24 VITALS
SYSTOLIC BLOOD PRESSURE: 118 MMHG | BODY MASS INDEX: 42.95 KG/M2 | DIASTOLIC BLOOD PRESSURE: 80 MMHG | WEIGHT: 266.13 LBS

## 2020-01-24 DIAGNOSIS — O99.212 OBESITY AFFECTING PREGNANCY IN SECOND TRIMESTER: ICD-10-CM

## 2020-01-24 DIAGNOSIS — O09.893 PRIOR PREGNANCY COMPLICATED BY PIH, ANTEPARTUM, THIRD TRIMESTER: ICD-10-CM

## 2020-01-24 DIAGNOSIS — Z34.92 NORMAL PREGNANCY IN SECOND TRIMESTER: Primary | ICD-10-CM

## 2020-01-24 DIAGNOSIS — Z98.891 PREVIOUS CESAREAN SECTION: ICD-10-CM

## 2020-01-24 PROBLEM — Z3A.24 24 WEEKS GESTATION OF PREGNANCY: Status: RESOLVED | Noted: 2020-01-08 | Resolved: 2020-01-24

## 2020-01-24 PROBLEM — O21.9 NAUSEA AND VOMITING IN PREGNANCY: Status: RESOLVED | Noted: 2019-12-08 | Resolved: 2020-01-24

## 2020-01-24 PROCEDURE — 99213 OFFICE O/P EST LOW 20 MIN: CPT | Mod: TH,S$PBB,, | Performed by: ADVANCED PRACTICE MIDWIFE

## 2020-01-24 PROCEDURE — 76816 OB US FOLLOW-UP PER FETUS: CPT | Mod: 26,S$PBB,, | Performed by: OBSTETRICS & GYNECOLOGY

## 2020-01-24 PROCEDURE — 76816 PR  US,PREGNANT UTERUS,F/U,TRANSABD APP: ICD-10-PCS | Mod: 26,S$PBB,, | Performed by: OBSTETRICS & GYNECOLOGY

## 2020-01-24 PROCEDURE — 99999 PR PBB SHADOW E&M-EST. PATIENT-LVL II: CPT | Mod: PBBFAC,,, | Performed by: ADVANCED PRACTICE MIDWIFE

## 2020-01-24 PROCEDURE — 99999 PR PBB SHADOW E&M-EST. PATIENT-LVL II: ICD-10-PCS | Mod: PBBFAC,,, | Performed by: ADVANCED PRACTICE MIDWIFE

## 2020-01-24 PROCEDURE — 99212 OFFICE O/P EST SF 10 MIN: CPT | Mod: PBBFAC,TH,25 | Performed by: ADVANCED PRACTICE MIDWIFE

## 2020-01-24 PROCEDURE — 76816 OB US FOLLOW-UP PER FETUS: CPT | Mod: PBBFAC | Performed by: OBSTETRICS & GYNECOLOGY

## 2020-01-24 PROCEDURE — 99213 PR OFFICE/OUTPT VISIT, EST, LEVL III, 20-29 MIN: ICD-10-PCS | Mod: TH,S$PBB,, | Performed by: ADVANCED PRACTICE MIDWIFE

## 2020-01-24 RX ORDER — NAPROXEN SODIUM 220 MG/1
81 TABLET, FILM COATED ORAL DAILY
Qty: 90 TABLET | Refills: 3 | Status: ON HOLD | OUTPATIENT
Start: 2020-01-24 | End: 2020-03-09 | Stop reason: SDUPTHER

## 2020-01-24 RX ORDER — ONDANSETRON 4 MG/1
TABLET, FILM COATED ORAL
Status: ON HOLD | COMMUNITY
Start: 2019-12-25 | End: 2020-03-23 | Stop reason: HOSPADM

## 2020-01-24 RX ORDER — VALACYCLOVIR HYDROCHLORIDE 500 MG/1
500 TABLET, FILM COATED ORAL DAILY
Refills: 5 | COMMUNITY
Start: 2019-12-06 | End: 2020-01-24

## 2020-01-24 NOTE — PROGRESS NOTES
[facility] Declines antidepressants but willing to go to therapy  sono done anatomy complete  28 week labs next visit  Reports excessive saliva sucking on peppermints to help  Advised maternity support belt  Baby aspirin daily

## 2020-01-26 ENCOUNTER — HOSPITAL ENCOUNTER (OUTPATIENT)
Facility: HOSPITAL | Age: 25
Discharge: HOME | End: 2020-01-26
Attending: OBSTETRICS & GYNECOLOGY | Admitting: OBSTETRICS & GYNECOLOGY
Payer: MEDICAID

## 2020-01-26 VITALS
OXYGEN SATURATION: 97 % | RESPIRATION RATE: 20 BRPM | SYSTOLIC BLOOD PRESSURE: 105 MMHG | DIASTOLIC BLOOD PRESSURE: 63 MMHG | TEMPERATURE: 98 F | HEART RATE: 91 BPM

## 2020-01-26 DIAGNOSIS — O21.9 NAUSEA AND VOMITING IN PREGNANCY: ICD-10-CM

## 2020-01-26 PROCEDURE — 96374 THER/PROPH/DIAG INJ IV PUSH: CPT

## 2020-01-26 PROCEDURE — 96365 THER/PROPH/DIAG IV INF INIT: CPT

## 2020-01-26 PROCEDURE — 99213 OFFICE O/P EST LOW 20 MIN: CPT | Mod: TH,,, | Performed by: MIDWIFE

## 2020-01-26 PROCEDURE — 96360 HYDRATION IV INFUSION INIT: CPT

## 2020-01-26 PROCEDURE — 63600175 PHARM REV CODE 636 W HCPCS: Performed by: MIDWIFE

## 2020-01-26 PROCEDURE — 99213 PR OFFICE/OUTPT VISIT, EST, LEVL III, 20-29 MIN: ICD-10-PCS | Mod: TH,,, | Performed by: MIDWIFE

## 2020-01-26 PROCEDURE — 99211 OFF/OP EST MAY X REQ PHY/QHP: CPT | Mod: 25,TH

## 2020-01-26 PROCEDURE — 96361 HYDRATE IV INFUSION ADD-ON: CPT

## 2020-01-26 RX ORDER — SODIUM CHLORIDE 9 MG/ML
INJECTION, SOLUTION INTRAVENOUS CONTINUOUS
Status: DISCONTINUED | OUTPATIENT
Start: 2020-01-26 | End: 2020-01-26 | Stop reason: HOSPADM

## 2020-01-26 RX ORDER — ACETAMINOPHEN 500 MG
500 TABLET ORAL EVERY 6 HOURS PRN
Status: DISCONTINUED | OUTPATIENT
Start: 2020-01-26 | End: 2020-01-26 | Stop reason: HOSPADM

## 2020-01-26 RX ADMIN — PROMETHAZINE HYDROCHLORIDE 12.5 MG: 25 INJECTION INTRAMUSCULAR; INTRAVENOUS at 07:01

## 2020-01-26 RX ADMIN — SODIUM CHLORIDE, SODIUM LACTATE, POTASSIUM CHLORIDE, AND CALCIUM CHLORIDE 1000 ML: .6; .31; .03; .02 INJECTION, SOLUTION INTRAVENOUS at 07:01

## 2020-01-26 NOTE — NURSING
Discharge instructions given to pt. Pt verbalized understanding. Pt instructed to wait in lobby/waiting area until ride home arrived at hospital.

## 2020-01-26 NOTE — DISCHARGE INSTRUCTIONS

## 2020-01-26 NOTE — H&P
[facility] -   Obstetrics  History & Physical    Patient Name: Juliet Pop  MRN: [id number]  Admission Date: 2020  Primary Care Provider: Primary Doctor No    Subjective:     Principal Problem:Nausea and vomiting in pregnancy    History of Present Illness:   27.1 to L&D with c/o nausea, vomiting, and diarrhea.      Obstetric HPI:  Patient reports None contractions, active fetal movement, No vaginal bleeding , No loss of fluid     This pregnancy has been complicated by HSV, UTI, anxiety and depression, suicidal ideation, and Hx of prior pregnancy with PIH.    OB History    Para Term  AB Living   2 1 1 0 0 1   SAB TAB Ectopic Multiple Live Births   0 0 0 0 1      # Outcome Date GA Lbr Goldy/2nd Weight Sex Delivery Anes PTL Lv   2 Current            1 Term 17 37w1d  2.835 kg (6 lb 4 oz) M CS-LTranv Gen N BETTINA      Complications: Failure to Progress in First Stage      Name: VANESA POP      Apgar1: 7  Apgar5: 9     Past Medical History:   Diagnosis Date    Abnormal Pap smear of cervix     Acute viral syndrome 2020    Herpes simplex virus (HSV) infection     Pre-eclampsia in third trimester 2017     Past Surgical History:   Procedure Laterality Date     SECTION      MOUTH SURGERY         PTA Medications   Medication Sig    aspirin 81 MG Chew Take 1 tablet (81 mg total) by mouth once daily.    ondansetron (ZOFRAN) 4 MG tablet     prenatal vits62/FA/om3/dha/epa (PRENATAL GUMMY ORAL) Take by mouth.       Review of patient's allergies indicates:  No Known Allergies     Family History     Problem Relation (Age of Onset)    Colon cancer Maternal Grandfather    Diabetes Maternal Grandmother    Heart disease Maternal Grandmother    Hypertension Maternal Grandmother        Tobacco Use    Smoking status: Never Smoker    Smokeless tobacco: Never Used   Substance and Sexual Activity    Alcohol use: Not Currently     Frequency: Monthly or less      Drinks per session: 1 or 2     Binge frequency: Never     Comment: social use     Drug use: Yes     Types: Marijuana     Comment: Last used in August 2019    Sexual activity: Yes     Partners: Male     Birth control/protection: None     Review of Systems   Constitutional: Positive for appetite change.   Respiratory: Negative.    Cardiovascular: Negative.    Gastrointestinal: Positive for abdominal pain, diarrhea, nausea and vomiting.        States abdominal pain is associated with diarrhea/bowel movements.   Neurological: Negative.    All other systems reviewed and are negative.     Objective:     Vital Signs (Most Recent):  Temp: 98.3 °F (36.8 °C) (01/26/20 0700)  Pulse: 91 (01/26/20 0747)  Resp: 20 (01/26/20 0700)  BP: 105/63 (01/26/20 0747)  SpO2: 97 % (01/26/20 0744) Vital Signs (24h Range):  Temp:  [98.3 °F (36.8 °C)] 98.3 °F (36.8 °C)  Pulse:  [] 91  Resp:  [20] 20  SpO2:  [97 %-99 %] 97 %  BP: (102-132)/(53-74) 105/63        There is no height or weight on file to calculate BMI.    FHT: Cat 1 (reassuring) accels present, no decels  TOCO:  none    Physical Exam:   Constitutional: She is oriented to person, place, and time. She appears well-developed and well-nourished.    HENT:   Head: Normocephalic and atraumatic.    Eyes: Conjunctivae and EOM are normal.    Neck: Normal range of motion.    Cardiovascular: Normal rate, regular rhythm and normal heart sounds.  Exam reveals no edema.     Pulmonary/Chest: Effort normal and breath sounds normal. No respiratory distress.        Abdominal: Soft. Bowel sounds are normal. She exhibits no distension. There is no tenderness.     Genitourinary: Uterus normal.           Musculoskeletal: Normal range of motion and moves all extremeties.       Neurological: She is alert and oriented to person, place, and time.    Skin: Skin is warm and dry.    Psychiatric: She has a normal mood and affect. Her behavior is normal.       Cervix:  Dilation:  deferred     Significant  Labs:  Lab Results   Component Value Date    GROUPTRH O POS 10/09/2019    HEPBSAG Negative 2019    STREPBCULT STREPTOCOCCUS AGALACTIAE (GROUP B) 2017       I have personallly reviewed all pertinent lab results from the last 24 hours.    Assessment/Plan:     24 y.o. female  at 27w1d for:    * Nausea and vomiting in pregnancy  OB triage  VSSAF  IV hydration  IV Phenergan  Continue to monitor        Marily Sheehan CNM  Obstetrics  [facility]

## 2020-01-26 NOTE — ASSESSMENT & PLAN NOTE
OB triage  IV hydration  IV Phenergan  Continue to monitor  Patient has not vomited or had episode of diarrhea since admission  States feeling better  Has RX's for Zofran and Phenergan at home  D/c to home, patient instructed to call for ride

## 2020-01-26 NOTE — DISCHARGE SUMMARY
[facility]  Obstetrics  Discharge Summary      Patient Name: Juliet Jalloh  MRN: [id number]  Admission Date: 2020  Hospital Length of Stay: 0 days  Discharge Date and Time:  2020 9:02 AM  Attending Physician: Mary Vazquez MD   Discharging Provider: Marily Sheehan CNM   Primary Care Provider: Primary Doctor No    HPI:  27.1 to L&D with c/o nausea, vomiting, and diarrhea.      FHT: Cat 1 (reassuring)  TOCO:  none    * No surgery found *     Hospital Course:   OB triage  IV hydration  IV Phenergan  Continue to monitor  Patient has not vomited or had episode of diarrhea since admission  States feeling better  Has RX's for Zofran and Phenergan at home  D/c to home, patient instructed to call for ride         Final Active Diagnoses:    Diagnosis Date Noted POA    PRINCIPAL PROBLEM:  Nausea and vomiting in pregnancy [O21.9] 2020 Yes      Problems Resolved During this Admission:        Immunizations     None          This patient has no babies on file.  Pending Diagnostic Studies:     None          Discharged Condition: good    Disposition: Home or Self Care    Follow Up:  Follow-up Information     Please follow up.    Why:  keep next scheduled OB appointment               Patient Instructions:      Activity as tolerated     Medications:  Current Discharge Medication List      CONTINUE these medications which have NOT CHANGED    Details   aspirin 81 MG Chew Take 1 tablet (81 mg total) by mouth once daily.  Qty: 90 tablet, Refills: 3      ondansetron (ZOFRAN) 4 MG tablet       prenatal vits62/FA/om3/dha/epa (PRENATAL GUMMY ORAL) Take by mouth.             Marily Sheehan CNM  Obstetrics  [facility]

## 2020-01-26 NOTE — SUBJECTIVE & OBJECTIVE
Obstetric HPI:  Patient reports None contractions, active fetal movement, No vaginal bleeding , No loss of fluid     This pregnancy has been complicated by HSV, UTI, anxiety and depression, suicidal ideation, and Hx of prior pregnancy with PIH.    OB History    Para Term  AB Living   2 1 1 0 0 1   SAB TAB Ectopic Multiple Live Births   0 0 0 0 1      # Outcome Date GA Lbr Goldy/2nd Weight Sex Delivery Anes PTL Lv   2 Current            1 Term 17 37w1d  2.835 kg (6 lb 4 oz) M CS-LTranv Gen N BETTINA      Complications: Failure to Progress in First Stage      Name: VANESA POP      Apgar1: 7  Apgar5: 9     Past Medical History:   Diagnosis Date    Abnormal Pap smear of cervix     Acute viral syndrome 2020    Herpes simplex virus (HSV) infection     Pre-eclampsia in third trimester 2017     Past Surgical History:   Procedure Laterality Date     SECTION      MOUTH SURGERY         PTA Medications   Medication Sig    aspirin 81 MG Chew Take 1 tablet (81 mg total) by mouth once daily.    ondansetron (ZOFRAN) 4 MG tablet     prenatal vits62/FA/om3/dha/epa (PRENATAL GUMMY ORAL) Take by mouth.       Review of patient's allergies indicates:  No Known Allergies     Family History     Problem Relation (Age of Onset)    Colon cancer Maternal Grandfather    Diabetes Maternal Grandmother    Heart disease Maternal Grandmother    Hypertension Maternal Grandmother        Tobacco Use    Smoking status: Never Smoker    Smokeless tobacco: Never Used   Substance and Sexual Activity    Alcohol use: Not Currently     Frequency: Monthly or less     Drinks per session: 1 or 2     Binge frequency: Never     Comment: social use     Drug use: Yes     Types: Marijuana     Comment: Last used in 2019    Sexual activity: Yes     Partners: Male     Birth control/protection: None     Review of Systems   Constitutional: Positive for appetite change.   Respiratory: Negative.    Cardiovascular:  Negative.    Gastrointestinal: Positive for abdominal pain, diarrhea, nausea and vomiting.        States abdominal pain is associated with diarrhea/bowel movements.   Neurological: Negative.    All other systems reviewed and are negative.     Objective:     Vital Signs (Most Recent):  Temp: 98.3 °F (36.8 °C) (01/26/20 0700)  Pulse: 91 (01/26/20 0747)  Resp: 20 (01/26/20 0700)  BP: 105/63 (01/26/20 0747)  SpO2: 97 % (01/26/20 0744) Vital Signs (24h Range):  Temp:  [98.3 °F (36.8 °C)] 98.3 °F (36.8 °C)  Pulse:  [] 91  Resp:  [20] 20  SpO2:  [97 %-99 %] 97 %  BP: (102-132)/(53-74) 105/63        There is no height or weight on file to calculate BMI.    FHT: Cat 1 (reassuring) accels present, no decels  TOCO:  none    Physical Exam:   Constitutional: She is oriented to person, place, and time. She appears well-developed and well-nourished.    HENT:   Head: Normocephalic and atraumatic.    Eyes: Conjunctivae and EOM are normal.    Neck: Normal range of motion.    Cardiovascular: Normal rate, regular rhythm and normal heart sounds.  Exam reveals no edema.     Pulmonary/Chest: Effort normal and breath sounds normal. No respiratory distress.        Abdominal: Soft. Bowel sounds are normal. She exhibits no distension. There is no tenderness.     Genitourinary: Uterus normal.           Musculoskeletal: Normal range of motion and moves all extremeties.       Neurological: She is alert and oriented to person, place, and time.    Skin: Skin is warm and dry.    Psychiatric: She has a normal mood and affect. Her behavior is normal.       Cervix:  Dilation:  deferred     Significant Labs:  Lab Results   Component Value Date    GROUPTRH O POS 10/09/2019    HEPBSAG Negative 12/08/2019    STREPBCULT STREPTOCOCCUS AGALACTIAE (GROUP B) 07/18/2017       I have personallly reviewed all pertinent lab results from the last 24 hours.

## 2020-01-26 NOTE — PROGRESS NOTES
[facility]  Obstetrics  Antepartum Progress Note    Patient Name: Juliet Jalloh  MRN: [id number]  Admission Date: 2020  Hospital Length of Stay: 0 days  Attending Physician: Mary Vazquez MD  Primary Care Provider: Primary Doctor No    Subjective:     Principal Problem:Nausea and vomiting in pregnancy    HPI:   27.1 to L&D with c/o nausea, vomiting, and diarrhea.      Hospital Course:  OB triage  IV hydration  IV Phenergan  Continue to monitor  Patient has not vomited or had episode of diarrhea since admission  States feeling better  Has RX's for Zofran and Phenergan at home  D/c to home, patient instructed to call for ride    No new subjective & objective note has been filed under this hospital service since the last note was generated.    Assessment/Plan:     24 y.o. female  at 27w1d for:    * Nausea and vomiting in pregnancy  OB triage  IV hydration  IV Phenergan  Continue to monitor  Patient has not vomited or had episode of diarrhea since admission  States feeling better  Has RX's for Zofran and Phenergan at home  D/c to home, patient instructed to call for ridradha Sheehan CNM  Obstetrics  [facility]

## 2020-01-28 ENCOUNTER — TELEPHONE (OUTPATIENT)
Dept: OBSTETRICS AND GYNECOLOGY | Facility: HOSPITAL | Age: 25
End: 2020-01-28

## 2020-01-28 NOTE — TELEPHONE ENCOUNTER
Triage Discharged Patient Questionnaire:  1. I see that you were seen for nausea and vomiting. How are you feeling? Feeling a little better today   2. Do you have any questions regarding your discharge instructions? No If so, I can address them  3. Do you feel your concerns regarding your care were addressed? Yes   4. Did you receive any prescriptions with your visit? NO Do you have any questions about those medications? NO  5. Do you have a follow-up appointment with your provider? Yes   If so, when? 2/7/2020    REWARD AND RECOGNITION   Are there any physicians, nurses, or hospital staff you would like us to recognize for doing a very good job?     PROCESS IMPROVEMENT    Do you have any questions for me? No  Do you have any suggestions for us to improve the care we provide to our patients and the community we serve? Sometimes it felt like staff was moving too fast  Thank you for taking the time to share with me about your care.

## 2020-02-07 ENCOUNTER — LAB VISIT (OUTPATIENT)
Dept: LAB | Facility: HOSPITAL | Age: 25
End: 2020-02-07
Attending: ADVANCED PRACTICE MIDWIFE
Payer: MEDICAID

## 2020-02-07 ENCOUNTER — ROUTINE PRENATAL (OUTPATIENT)
Dept: OBSTETRICS AND GYNECOLOGY | Facility: CLINIC | Age: 25
End: 2020-02-07
Payer: MEDICAID

## 2020-02-07 VITALS
BODY MASS INDEX: 42.63 KG/M2 | SYSTOLIC BLOOD PRESSURE: 118 MMHG | WEIGHT: 264.13 LBS | DIASTOLIC BLOOD PRESSURE: 80 MMHG

## 2020-02-07 DIAGNOSIS — O26.893 PELVIC PAIN AFFECTING PREGNANCY IN THIRD TRIMESTER, ANTEPARTUM: Primary | ICD-10-CM

## 2020-02-07 DIAGNOSIS — O99.213 OBESITY AFFECTING PREGNANCY IN THIRD TRIMESTER: ICD-10-CM

## 2020-02-07 DIAGNOSIS — O09.93 SUPERVISION OF HIGH RISK PREGNANCY IN THIRD TRIMESTER: ICD-10-CM

## 2020-02-07 DIAGNOSIS — O99.343 DEPRESSION AFFECTING PREGNANCY IN THIRD TRIMESTER, ANTEPARTUM: ICD-10-CM

## 2020-02-07 DIAGNOSIS — Z34.92 NORMAL PREGNANCY IN SECOND TRIMESTER: ICD-10-CM

## 2020-02-07 DIAGNOSIS — F32.A DEPRESSION AFFECTING PREGNANCY IN THIRD TRIMESTER, ANTEPARTUM: ICD-10-CM

## 2020-02-07 DIAGNOSIS — R10.2 PELVIC PAIN AFFECTING PREGNANCY IN THIRD TRIMESTER, ANTEPARTUM: Primary | ICD-10-CM

## 2020-02-07 LAB
BASOPHILS # BLD AUTO: 0.02 K/UL (ref 0–0.2)
BASOPHILS NFR BLD: 0.4 % (ref 0–1.9)
DIFFERENTIAL METHOD: ABNORMAL
EOSINOPHIL # BLD AUTO: 0.2 K/UL (ref 0–0.5)
EOSINOPHIL NFR BLD: 3.2 % (ref 0–8)
ERYTHROCYTE [DISTWIDTH] IN BLOOD BY AUTOMATED COUNT: 12.8 % (ref 11.5–14.5)
GLUCOSE SERPL-MCNC: 92 MG/DL (ref 70–140)
HCT VFR BLD AUTO: 29.8 % (ref 37–48.5)
HGB BLD-MCNC: 9.3 G/DL (ref 12–16)
IMM GRANULOCYTES # BLD AUTO: 0.02 K/UL (ref 0–0.04)
IMM GRANULOCYTES NFR BLD AUTO: 0.4 % (ref 0–0.5)
LYMPHOCYTES # BLD AUTO: 1.1 K/UL (ref 1–4.8)
LYMPHOCYTES NFR BLD: 20.2 % (ref 18–48)
MCH RBC QN AUTO: 28.4 PG (ref 27–31)
MCHC RBC AUTO-ENTMCNC: 31.2 G/DL (ref 32–36)
MCV RBC AUTO: 91 FL (ref 82–98)
MONOCYTES # BLD AUTO: 0.5 K/UL (ref 0.3–1)
MONOCYTES NFR BLD: 8.6 % (ref 4–15)
NEUTROPHILS # BLD AUTO: 3.8 K/UL (ref 1.8–7.7)
NEUTROPHILS NFR BLD: 67.2 % (ref 38–73)
NRBC BLD-RTO: 0 /100 WBC
PLATELET # BLD AUTO: 336 K/UL (ref 150–350)
PMV BLD AUTO: 10.4 FL (ref 9.2–12.9)
RBC # BLD AUTO: 3.28 M/UL (ref 4–5.4)
WBC # BLD AUTO: 5.59 K/UL (ref 3.9–12.7)

## 2020-02-07 PROCEDURE — 82950 GLUCOSE TEST: CPT

## 2020-02-07 PROCEDURE — 85025 COMPLETE CBC W/AUTO DIFF WBC: CPT

## 2020-02-07 PROCEDURE — 99999 PR PBB SHADOW E&M-EST. PATIENT-LVL II: ICD-10-PCS | Mod: PBBFAC,,, | Performed by: ADVANCED PRACTICE MIDWIFE

## 2020-02-07 PROCEDURE — 99213 OFFICE O/P EST LOW 20 MIN: CPT | Mod: TH,S$PBB,, | Performed by: ADVANCED PRACTICE MIDWIFE

## 2020-02-07 PROCEDURE — 86703 HIV-1/HIV-2 1 RESULT ANTBDY: CPT

## 2020-02-07 PROCEDURE — 36415 COLL VENOUS BLD VENIPUNCTURE: CPT

## 2020-02-07 PROCEDURE — 99212 OFFICE O/P EST SF 10 MIN: CPT | Mod: PBBFAC,TH | Performed by: ADVANCED PRACTICE MIDWIFE

## 2020-02-07 PROCEDURE — 86592 SYPHILIS TEST NON-TREP QUAL: CPT

## 2020-02-07 PROCEDURE — 99213 PR OFFICE/OUTPT VISIT, EST, LEVL III, 20-29 MIN: ICD-10-PCS | Mod: TH,S$PBB,, | Performed by: ADVANCED PRACTICE MIDWIFE

## 2020-02-07 PROCEDURE — 99999 PR PBB SHADOW E&M-EST. PATIENT-LVL II: CPT | Mod: PBBFAC,,, | Performed by: ADVANCED PRACTICE MIDWIFE

## 2020-02-07 RX ORDER — DOCUSATE SODIUM 100 MG/1
100 CAPSULE, LIQUID FILLED ORAL 2 TIMES DAILY
Qty: 30 CAPSULE | Refills: 1 | Status: SHIPPED | OUTPATIENT
Start: 2020-02-07 | End: 2020-07-06

## 2020-02-07 RX ORDER — CYCLOBENZAPRINE HCL 10 MG
10 TABLET ORAL 3 TIMES DAILY PRN
Qty: 30 TABLET | Refills: 1 | Status: SHIPPED | OUTPATIENT
Start: 2020-02-07 | End: 2020-02-17

## 2020-02-07 RX ORDER — PROMETHAZINE HYDROCHLORIDE 25 MG/1
25 SUPPOSITORY RECTAL EVERY 6 HOURS PRN
Qty: 12 SUPPOSITORY | Refills: 1 | Status: ON HOLD | OUTPATIENT
Start: 2020-02-07 | End: 2020-03-23 | Stop reason: HOSPADM

## 2020-02-07 RX ORDER — FERROUS SULFATE 325(65) MG
325 TABLET, DELAYED RELEASE (ENTERIC COATED) ORAL 2 TIMES DAILY
Qty: 60 TABLET | Refills: 3 | Status: SHIPPED | OUTPATIENT
Start: 2020-02-07 | End: 2020-07-06

## 2020-02-07 NOTE — PROGRESS NOTES
C/o pelvic pain in vagina Has maternity belt but not wearing it  Referral to PT department  Reports nausea and vomiting given rx for phenergan suppository   Urged to take zofan with stool softener to avoid constipation  28 week labs in progress  Has had 22 pound weight loss for pregnancy  Kick counts  Ferrous sulfate 2xd

## 2020-02-08 LAB — RPR SER QL: NORMAL

## 2020-02-10 LAB — HIV 1+2 AB+HIV1 P24 AG SERPL QL IA: NEGATIVE

## 2020-02-17 ENCOUNTER — HOSPITAL ENCOUNTER (OUTPATIENT)
Facility: HOSPITAL | Age: 25
Discharge: HOME | End: 2020-02-17
Attending: OBSTETRICS & GYNECOLOGY | Admitting: OBSTETRICS & GYNECOLOGY
Payer: MEDICAID

## 2020-02-17 ENCOUNTER — TELEPHONE (OUTPATIENT)
Dept: OBSTETRICS AND GYNECOLOGY | Facility: HOSPITAL | Age: 25
End: 2020-02-17

## 2020-02-17 VITALS
DIASTOLIC BLOOD PRESSURE: 88 MMHG | TEMPERATURE: 98 F | BODY MASS INDEX: 41.78 KG/M2 | WEIGHT: 260 LBS | RESPIRATION RATE: 20 BRPM | OXYGEN SATURATION: 97 % | HEART RATE: 81 BPM | HEIGHT: 66 IN | SYSTOLIC BLOOD PRESSURE: 141 MMHG

## 2020-02-17 DIAGNOSIS — O13.3 GESTATIONAL HYPERTENSION WITHOUT SIGNIFICANT PROTEINURIA IN THIRD TRIMESTER: ICD-10-CM

## 2020-02-17 DIAGNOSIS — O16.3 HYPERTENSION DURING PREGNANCY IN THIRD TRIMESTER: ICD-10-CM

## 2020-02-17 PROBLEM — O34.219 PREVIOUS CESAREAN DELIVERY AFFECTING PREGNANCY, ANTEPARTUM: Status: ACTIVE | Noted: 2020-02-17

## 2020-02-17 LAB
ALBUMIN SERPL BCP-MCNC: 2.7 G/DL (ref 3.5–5.2)
ALP SERPL-CCNC: 124 U/L (ref 55–135)
ALT SERPL W/O P-5'-P-CCNC: 9 U/L (ref 10–44)
ANION GAP SERPL CALC-SCNC: 8 MMOL/L (ref 8–16)
AST SERPL-CCNC: 12 U/L (ref 10–40)
BASOPHILS # BLD AUTO: 0.01 K/UL (ref 0–0.2)
BASOPHILS NFR BLD: 0.1 % (ref 0–1.9)
BILIRUB SERPL-MCNC: 0.2 MG/DL (ref 0.1–1)
BUN SERPL-MCNC: 5 MG/DL (ref 6–20)
CALCIUM SERPL-MCNC: 8.6 MG/DL (ref 8.7–10.5)
CHLORIDE SERPL-SCNC: 104 MMOL/L (ref 95–110)
CO2 SERPL-SCNC: 24 MMOL/L (ref 23–29)
CREAT SERPL-MCNC: 0.6 MG/DL (ref 0.5–1.4)
CREAT UR-MCNC: 184.6 MG/DL (ref 15–325)
DIFFERENTIAL METHOD: ABNORMAL
EOSINOPHIL # BLD AUTO: 0.1 K/UL (ref 0–0.5)
EOSINOPHIL NFR BLD: 1.2 % (ref 0–8)
ERYTHROCYTE [DISTWIDTH] IN BLOOD BY AUTOMATED COUNT: 12.4 % (ref 11.5–14.5)
EST. GFR  (AFRICAN AMERICAN): >60 ML/MIN/1.73 M^2
EST. GFR  (NON AFRICAN AMERICAN): >60 ML/MIN/1.73 M^2
GLUCOSE SERPL-MCNC: 98 MG/DL (ref 70–110)
HCT VFR BLD AUTO: 28.6 % (ref 37–48.5)
HGB BLD-MCNC: 8.9 G/DL (ref 12–16)
IMM GRANULOCYTES # BLD AUTO: 0.03 K/UL (ref 0–0.04)
IMM GRANULOCYTES NFR BLD AUTO: 0.4 % (ref 0–0.5)
LYMPHOCYTES # BLD AUTO: 1.6 K/UL (ref 1–4.8)
LYMPHOCYTES NFR BLD: 24.2 % (ref 18–48)
MCH RBC QN AUTO: 27.2 PG (ref 27–31)
MCHC RBC AUTO-ENTMCNC: 31.1 G/DL (ref 32–36)
MCV RBC AUTO: 88 FL (ref 82–98)
MONOCYTES # BLD AUTO: 0.4 K/UL (ref 0.3–1)
MONOCYTES NFR BLD: 6.5 % (ref 4–15)
NEUTROPHILS # BLD AUTO: 4.5 K/UL (ref 1.8–7.7)
NEUTROPHILS NFR BLD: 67.6 % (ref 38–73)
NRBC BLD-RTO: 0 /100 WBC
PLATELET # BLD AUTO: 383 K/UL (ref 150–350)
PMV BLD AUTO: 9.4 FL (ref 9.2–12.9)
POTASSIUM SERPL-SCNC: 3.4 MMOL/L (ref 3.5–5.1)
PROT SERPL-MCNC: 6.7 G/DL (ref 6–8.4)
PROT UR-MCNC: 21 MG/DL (ref 0–15)
PROT/CREAT UR: 0.11 MG/G{CREAT} (ref 0–0.2)
RBC # BLD AUTO: 3.27 M/UL (ref 4–5.4)
SODIUM SERPL-SCNC: 136 MMOL/L (ref 136–145)
WBC # BLD AUTO: 6.73 K/UL (ref 3.9–12.7)

## 2020-02-17 PROCEDURE — 59025 OBTAIN FETAL NONSTRESS TEST (NST): ICD-10-PCS | Mod: 26,,, | Performed by: ADVANCED PRACTICE MIDWIFE

## 2020-02-17 PROCEDURE — 99211 OFF/OP EST MAY X REQ PHY/QHP: CPT | Mod: 25,TH

## 2020-02-17 PROCEDURE — 59025 FETAL NON-STRESS TEST: CPT

## 2020-02-17 PROCEDURE — 85025 COMPLETE CBC W/AUTO DIFF WBC: CPT

## 2020-02-17 PROCEDURE — 25000003 PHARM REV CODE 250: Performed by: ADVANCED PRACTICE MIDWIFE

## 2020-02-17 PROCEDURE — 59025 FETAL NON-STRESS TEST: CPT | Mod: 26,,, | Performed by: ADVANCED PRACTICE MIDWIFE

## 2020-02-17 PROCEDURE — 99213 PR OFFICE/OUTPT VISIT, EST, LEVL III, 20-29 MIN: ICD-10-PCS | Mod: 25,TH,, | Performed by: ADVANCED PRACTICE MIDWIFE

## 2020-02-17 PROCEDURE — 84156 ASSAY OF PROTEIN URINE: CPT

## 2020-02-17 PROCEDURE — 80053 COMPREHEN METABOLIC PANEL: CPT

## 2020-02-17 PROCEDURE — 99213 OFFICE O/P EST LOW 20 MIN: CPT | Mod: 25,TH,, | Performed by: ADVANCED PRACTICE MIDWIFE

## 2020-02-17 RX ORDER — ACETAMINOPHEN 500 MG
500 TABLET ORAL EVERY 6 HOURS PRN
Status: DISCONTINUED | OUTPATIENT
Start: 2020-02-17 | End: 2020-02-17 | Stop reason: HOSPADM

## 2020-02-17 RX ORDER — BUTALBITAL, ACETAMINOPHEN AND CAFFEINE 50; 325; 40 MG/1; MG/1; MG/1
1 TABLET ORAL EVERY 4 HOURS PRN
Status: DISCONTINUED | OUTPATIENT
Start: 2020-02-17 | End: 2020-02-17 | Stop reason: HOSPADM

## 2020-02-17 RX ORDER — ONDANSETRON 8 MG/1
8 TABLET, ORALLY DISINTEGRATING ORAL EVERY 8 HOURS PRN
Status: DISCONTINUED | OUTPATIENT
Start: 2020-02-17 | End: 2020-02-17 | Stop reason: HOSPADM

## 2020-02-17 RX ORDER — SODIUM CHLORIDE 9 MG/ML
INJECTION, SOLUTION INTRAVENOUS CONTINUOUS
Status: DISCONTINUED | OUTPATIENT
Start: 2020-02-17 | End: 2020-02-17 | Stop reason: HOSPADM

## 2020-02-17 RX ADMIN — BUTALBITAL, ACETAMINOPHEN AND CAFFEINE 1 TABLET: 50; 325; 40 TABLET ORAL at 07:02

## 2020-02-18 NOTE — TELEPHONE ENCOUNTER
Can someone please check on pt's PT referral status? Referral was placed 2/7/20 by VINI Thomson CNM. Pt is still suffering with suprapubic pain. Thank you, Maria G

## 2020-02-18 NOTE — DISCHARGE SUMMARY
[facility]   Obstetrics  Discharge Summary      Patient Name: Juliet Jalloh  MRN: [id number]  Admission Date: 2020  Hospital Length of Stay: 0 days  Discharge Date and Time:  2020 7:43 PM  Attending Physician: Mary Vazquez MD   Discharging Provider: Maria G Billings CNM   Primary Care Provider: Primary Doctor No    HPI: 23yo  FADY 20 EGA 30w2d arrived c/o elevated BP at home today, + headache unrelieved with one regular tylenol and suprapubic pain. Ms Jalloh has a history of pre E in previous pregnancy, LTCS desiring a repeat c/s.     FHT: Cat 1 (reassuring)  TOCO: No UC    * No surgery found *     Hospital Course:   20 EFM, BP series, pre E labs, fioricet for her headache  20 Pre labs WNL< BP WNL, fioricet given for headache, exp will f/u with PT on referral status-message sent to VINI Thomson's staff         Final Active Diagnoses:    Diagnosis Date Noted POA    PRINCIPAL PROBLEM:  Hypertension during pregnancy in third trimester [O16.3] 2020 Yes      Problems Resolved During this Admission:        Labs:   CMP   Recent Labs   Lab 20  1830      K 3.4*      CO2 24   GLU 98   BUN 5*   CREATININE 0.6   CALCIUM 8.6*   PROT 6.7   ALBUMIN 2.7*   BILITOT 0.2   ALKPHOS 124   AST 12   ALT 9*   ANIONGAP 8   ESTGFRAFRICA >60   EGFRNONAA >60   , CBC   Recent Labs   Lab 20  1830   WBC 6.73   HGB 8.9*   HCT 28.6*   *    and p/c ratio 0.11    Feeding Method: NA    Immunizations     None          This patient has no babies on file.  Pending Diagnostic Studies:     None          Discharged Condition: good    Disposition: Home or Self Care    Follow Up:  Follow-up Information     Follow up On 2020.               Patient Instructions:      Diet Adult Regular     Notify your health care provider if you experience any of the following:  temperature >100.4     Notify your health care provider if you experience any of the following:   persistent nausea and vomiting or diarrhea     Notify your health care provider if you experience any of the following:  severe uncontrolled pain     Notify your health care provider if you experience any of the following:  severe persistent headache     Activity as tolerated     Medications:  Current Discharge Medication List      CONTINUE these medications which have NOT CHANGED    Details   aspirin 81 MG Chew Take 1 tablet (81 mg total) by mouth once daily.  Qty: 90 tablet, Refills: 3      cyclobenzaprine (FLEXERIL) 10 MG tablet Take 1 tablet (10 mg total) by mouth 3 (three) times daily as needed for Muscle spasms.  Qty: 30 tablet, Refills: 1      docusate sodium (COLACE) 100 MG capsule Take 1 capsule (100 mg total) by mouth 2 (two) times daily.  Qty: 30 capsule, Refills: 1      ferrous sulfate 325 (65 FE) MG EC tablet Take 1 tablet (325 mg total) by mouth 2 (two) times daily.  Qty: 60 tablet, Refills: 3      ondansetron (ZOFRAN) 4 MG tablet       prenatal vits62/FA/om3/dha/epa (PRENATAL GUMMY ORAL) Take by mouth.      promethazine (PHENERGAN) 25 MG suppository Place 1 suppository (25 mg total) rectally every 6 (six) hours as needed for Nausea.  Qty: 12 suppository, Refills: 1             Maria G Billings CNM  Obstetrics  [facility]

## 2020-02-18 NOTE — H&P
[facility]   Obstetrics  History & Physical    Patient Name: Juliet Pop  MRN: [id number]  Admission Date: 2020  Primary Care Provider: Primary Doctor No    Subjective:     Principal Problem:Hypertension during pregnancy in third trimester    History of Present Illness:  25yo  FADY 20 EGA 30w2d arrived c/o elevated BP at home today, + headache unrelieved with one regular tylenol and suprapubic pain. Ms Pop has a history of pre E in previous pregnancy, LTCS desiring a repeat c/s.     Obstetric HPI:      This pregnancy has been complicated by pre E in previous pregnancy, LTCS for FTP-desires repeat c/s, SPD s/s    OB History    Para Term  AB Living   2 1 1 0 0 1   SAB TAB Ectopic Multiple Live Births   0 0 0 0 1      # Outcome Date GA Lbr Goldy/2nd Weight Sex Delivery Anes PTL Lv   2 Current            1 Term 17 37w1d  2.835 kg (6 lb 4 oz) M CS-LTranv Gen N BETTINA      Complications: Failure to Progress in First Stage      Name: VANESA POP      Apgar1: 7  Apgar5: 9     Past Medical History:   Diagnosis Date    Abnormal Pap smear of cervix     Acute viral syndrome 2020    Herpes simplex virus (HSV) infection     Pre-eclampsia in third trimester 2017     Past Surgical History:   Procedure Laterality Date     SECTION      MOUTH SURGERY         PTA Medications   Medication Sig    aspirin 81 MG Chew Take 1 tablet (81 mg total) by mouth once daily.    cyclobenzaprine (FLEXERIL) 10 MG tablet Take 1 tablet (10 mg total) by mouth 3 (three) times daily as needed for Muscle spasms.    docusate sodium (COLACE) 100 MG capsule Take 1 capsule (100 mg total) by mouth 2 (two) times daily.    ferrous sulfate 325 (65 FE) MG EC tablet Take 1 tablet (325 mg total) by mouth 2 (two) times daily.    ondansetron (ZOFRAN) 4 MG tablet     prenatal vits62/FA/om3/dha/epa (PRENATAL GUMMY ORAL) Take by mouth.    promethazine (PHENERGAN) 25 MG suppository  Place 1 suppository (25 mg total) rectally every 6 (six) hours as needed for Nausea.       Review of patient's allergies indicates:  No Known Allergies     Family History     Problem Relation (Age of Onset)    Colon cancer Maternal Grandfather    Diabetes Maternal Grandmother    Heart disease Maternal Grandmother    Hypertension Maternal Grandmother        Tobacco Use    Smoking status: Never Smoker    Smokeless tobacco: Never Used   Substance and Sexual Activity    Alcohol use: Not Currently     Frequency: Monthly or less     Drinks per session: 1 or 2     Binge frequency: Never     Comment: social use     Drug use: Yes     Types: Marijuana     Comment: Last used in August 2019    Sexual activity: Yes     Partners: Male     Birth control/protection: None     Review of Systems   Gastrointestinal: Abdominal pain: suprapubic pain, increases with position changes.   Neurological: Positive for headaches.   All other systems reviewed and are negative.     Objective:     Vital Signs (Most Recent):  Temp: 98 °F (36.7 °C) (02/17/20 1902)  Pulse: 81 (02/17/20 1926)  Resp: 20 (02/17/20 1902)  BP: (!) 141/88 (02/17/20 1926)  SpO2: 97 % (02/17/20 1924) Vital Signs (24h Range):  Temp:  [98 °F (36.7 °C)] 98 °F (36.7 °C)  Pulse:  [0-96] 81  Resp:  [20] 20  SpO2:  [97 %-100 %] 97 %  BP: (101-141)/(47-98) 141/88     Weight: 117.9 kg (260 lb)  Body mass index is 41.97 kg/m².    FHT:Cat 1 (reassuring) moderate variability + accels, 150  TOCO: No UC    Physical Exam:   Constitutional: She is oriented to person, place, and time. She appears well-developed and well-nourished.      Neck: Normal range of motion.    Cardiovascular: Normal rate and regular rhythm.     Pulmonary/Chest: Effort normal.        Abdominal: Soft.   No UC  Suprapubic pain -suspect SPD- PT referral has been made     Genitourinary: Vagina normal.           Musculoskeletal: Normal range of motion and moves all extremeties.       Neurological: She is alert and  oriented to person, place, and time. She has normal reflexes.    Skin: Skin is warm and dry.    Psychiatric: She has a normal mood and affect. Her behavior is normal.       Cervix: deferred       Significant Labs:  Lab Results   Component Value Date    GROUPTRH O POS 10/09/2019    HEPBSAG Negative 12/08/2019    STREPBCULT STREPTOCOCCUS AGALACTIAE (GROUP B) 07/18/2017       Recent Lab Results       02/17/20  1830   02/17/20  1750        Albumin 2.7       Alkaline Phosphatase 124       ALT 9       Anion Gap 8       AST 12       Baso # 0.01       Basophil% 0.1       BILIRUBIN TOTAL 0.2  Comment:  For infants and newborns, interpretation of results should be based  on gestational age, weight and in agreement with clinical  observations.  Premature Infant recommended reference ranges:  Up to 24 hours.............<8.0 mg/dL  Up to 48 hours............<12.0 mg/dL  3-5 days..................<15.0 mg/dL  6-29 days.................<15.0 mg/dL         BUN, Bld 5       Calcium 8.6       Chloride 104       CO2 24       Creatinine 0.6       Creatinine, Random Ur   184.6  Comment:  The random urine reference ranges provided were established   for 24 hour urine collections.  No reference ranges exist for  random urine specimens.  Correlate clinically.       Differential Method Automated       eGFR if  >60       eGFR if non  >60  Comment:  Calculation used to obtain the estimated glomerular filtration  rate (eGFR) is the CKD-EPI equation.          Eos # 0.1       Eosinophil% 1.2       Glucose 98       Gran # (ANC) 4.5       Gran% 67.6       Hematocrit 28.6       Hemoglobin 8.9       Immature Grans (Abs) 0.03  Comment:  Mild elevation in immature granulocytes is non specific and   can be seen in a variety of conditions including stress response,   acute inflammation, trauma and pregnancy. Correlation with other   laboratory and clinical findings is essential.         Immature Granulocytes 0.4        Lymph # 1.6       Lymph% 24.2       MCH 27.2       MCHC 31.1       MCV 88       Mono # 0.4       Mono% 6.5       MPV 9.4       nRBC 0       Platelets 383       Potassium 3.4       Prot/Creat Ratio, Ur   0.11     PROTEIN TOTAL 6.7       Protein, Urine Random   21  Comment:  The random urine reference ranges provided were established   for 24 hour urine collections.  No reference ranges exist for  random urine specimens.  Correlate clinically.       RBC 3.27       RDW 12.4       Sodium 136       WBC 6.73           Assessment/Plan:     24 y.o. female  at 30w2d for:    * Hypertension during pregnancy in third trimester  BP series, NST  Pre E labs    Previous  delivery affecting pregnancy, antepartum  Desires repeat c/s      Maria G Billings CNM  Obstetrics  [facility]

## 2020-02-18 NOTE — HPI
25yo  FADY 20 EGA 30w2d arrived c/o elevated BP at home today, + headache unrelieved with one regular tylenol and suprapubic pain. Ms Jalloh has a history of pre E in previous pregnancy, LTCS desiring a repeat c/s.

## 2020-02-18 NOTE — PROCEDURES
"Juliet Jalloh is a 24 y.o. female patient.    Temp: 98 °F (36.7 °C) (02/17/20 1902)  Pulse: 81 (02/17/20 1926)  Resp: 20 (02/17/20 1902)  BP: (!) 141/88 (02/17/20 1926)  SpO2: 97 % (02/17/20 1924)  Weight: 117.9 kg (260 lb) (02/17/20 1800)  Height: 5' 6" (167.6 cm) (02/17/20 1800)       Obtain Fetal nonstress test (NST)  Date/Time: 2/17/2020 7:44 PM  Performed by: Maria G Billings CNM  Authorized by: Maria G Billings CNM     Nonstress Test:     Variability:  6-25 BPM    Decelerations:  None    Accelerations:  15 bpm    Baseline:  150    Uterine Irritability: No      Contractions:  Not present  Biophysical Profile:     Nonstress Test Interpretation: reactive      Overall Impression:  Reassuring  Post-procedure:     Patient tolerance:  Patient tolerated the procedure well with no immediate complications        Maria G Billings  2/17/2020  "

## 2020-02-18 NOTE — HOSPITAL COURSE
2/17/20 1915 EFM, BP series, pre E labs, fioricet for her headache  2/17/20 1945 Pre labs WNL< BP WNL, fioricet given for headache, exp will f/u with PT on referral status-message sent to VINI Thomson's staff

## 2020-02-18 NOTE — SUBJECTIVE & OBJECTIVE
Obstetric HPI:      This pregnancy has been complicated by pre E in previous pregnancy, LTCS for FTP-desires repeat c/s, SPD s/s    OB History    Para Term  AB Living   2 1 1 0 0 1   SAB TAB Ectopic Multiple Live Births   0 0 0 0 1      # Outcome Date GA Lbr Goldy/2nd Weight Sex Delivery Anes PTL Lv   2 Current            1 Term 17 37w1d  2.835 kg (6 lb 4 oz) M CS-LTranv Gen N BETTINA      Complications: Failure to Progress in First Stage      Name: VANESA POP      Apgar1: 7  Apgar5: 9     Past Medical History:   Diagnosis Date    Abnormal Pap smear of cervix     Acute viral syndrome 2020    Herpes simplex virus (HSV) infection     Pre-eclampsia in third trimester 2017     Past Surgical History:   Procedure Laterality Date     SECTION      MOUTH SURGERY         PTA Medications   Medication Sig    aspirin 81 MG Chew Take 1 tablet (81 mg total) by mouth once daily.    cyclobenzaprine (FLEXERIL) 10 MG tablet Take 1 tablet (10 mg total) by mouth 3 (three) times daily as needed for Muscle spasms.    docusate sodium (COLACE) 100 MG capsule Take 1 capsule (100 mg total) by mouth 2 (two) times daily.    ferrous sulfate 325 (65 FE) MG EC tablet Take 1 tablet (325 mg total) by mouth 2 (two) times daily.    ondansetron (ZOFRAN) 4 MG tablet     prenatal vits62/FA/om3/dha/epa (PRENATAL GUMMY ORAL) Take by mouth.    promethazine (PHENERGAN) 25 MG suppository Place 1 suppository (25 mg total) rectally every 6 (six) hours as needed for Nausea.       Review of patient's allergies indicates:  No Known Allergies     Family History     Problem Relation (Age of Onset)    Colon cancer Maternal Grandfather    Diabetes Maternal Grandmother    Heart disease Maternal Grandmother    Hypertension Maternal Grandmother        Tobacco Use    Smoking status: Never Smoker    Smokeless tobacco: Never Used   Substance and Sexual Activity    Alcohol use: Not Currently     Frequency: Monthly or  less     Drinks per session: 1 or 2     Binge frequency: Never     Comment: social use     Drug use: Yes     Types: Marijuana     Comment: Last used in August 2019    Sexual activity: Yes     Partners: Male     Birth control/protection: None     Review of Systems   Gastrointestinal: Abdominal pain: suprapubic pain, increases with position changes.   Neurological: Positive for headaches.   All other systems reviewed and are negative.     Objective:     Vital Signs (Most Recent):  Temp: 98 °F (36.7 °C) (02/17/20 1902)  Pulse: 81 (02/17/20 1926)  Resp: 20 (02/17/20 1902)  BP: (!) 141/88 (02/17/20 1926)  SpO2: 97 % (02/17/20 1924) Vital Signs (24h Range):  Temp:  [98 °F (36.7 °C)] 98 °F (36.7 °C)  Pulse:  [0-96] 81  Resp:  [20] 20  SpO2:  [97 %-100 %] 97 %  BP: (101-141)/(47-98) 141/88     Weight: 117.9 kg (260 lb)  Body mass index is 41.97 kg/m².    FHT:Cat 1 (reassuring) moderate variability + accels, 150  TOCO: No UC    Physical Exam:   Constitutional: She is oriented to person, place, and time. She appears well-developed and well-nourished.      Neck: Normal range of motion.    Cardiovascular: Normal rate and regular rhythm.     Pulmonary/Chest: Effort normal.        Abdominal: Soft.   No UC  Suprapubic pain -suspect SPD- PT referral has been made     Genitourinary: Vagina normal.           Musculoskeletal: Normal range of motion and moves all extremeties.       Neurological: She is alert and oriented to person, place, and time. She has normal reflexes.    Skin: Skin is warm and dry.    Psychiatric: She has a normal mood and affect. Her behavior is normal.       Cervix: deferred       Significant Labs:  Lab Results   Component Value Date    GROUPTRH O POS 10/09/2019    HEPBSAG Negative 12/08/2019    STREPBCULT STREPTOCOCCUS AGALACTIAE (GROUP B) 07/18/2017       Recent Lab Results       02/17/20  1830   02/17/20  1750        Albumin 2.7       Alkaline Phosphatase 124       ALT 9       Anion Gap 8       AST 12        Baso # 0.01       Basophil% 0.1       BILIRUBIN TOTAL 0.2  Comment:  For infants and newborns, interpretation of results should be based  on gestational age, weight and in agreement with clinical  observations.  Premature Infant recommended reference ranges:  Up to 24 hours.............<8.0 mg/dL  Up to 48 hours............<12.0 mg/dL  3-5 days..................<15.0 mg/dL  6-29 days.................<15.0 mg/dL         BUN, Bld 5       Calcium 8.6       Chloride 104       CO2 24       Creatinine 0.6       Creatinine, Random Ur   184.6  Comment:  The random urine reference ranges provided were established   for 24 hour urine collections.  No reference ranges exist for  random urine specimens.  Correlate clinically.       Differential Method Automated       eGFR if  >60       eGFR if non  >60  Comment:  Calculation used to obtain the estimated glomerular filtration  rate (eGFR) is the CKD-EPI equation.          Eos # 0.1       Eosinophil% 1.2       Glucose 98       Gran # (ANC) 4.5       Gran% 67.6       Hematocrit 28.6       Hemoglobin 8.9       Immature Grans (Abs) 0.03  Comment:  Mild elevation in immature granulocytes is non specific and   can be seen in a variety of conditions including stress response,   acute inflammation, trauma and pregnancy. Correlation with other   laboratory and clinical findings is essential.         Immature Granulocytes 0.4       Lymph # 1.6       Lymph% 24.2       MCH 27.2       MCHC 31.1       MCV 88       Mono # 0.4       Mono% 6.5       MPV 9.4       nRBC 0       Platelets 383       Potassium 3.4       Prot/Creat Ratio, Ur   0.11     PROTEIN TOTAL 6.7       Protein, Urine Random   21  Comment:  The random urine reference ranges provided were established   for 24 hour urine collections.  No reference ranges exist for  random urine specimens.  Correlate clinically.       RBC 3.27       RDW 12.4       Sodium 136       WBC 6.73

## 2020-02-19 ENCOUNTER — ROUTINE PRENATAL (OUTPATIENT)
Dept: OBSTETRICS AND GYNECOLOGY | Facility: CLINIC | Age: 25
End: 2020-02-19
Payer: MEDICAID

## 2020-02-19 VITALS — DIASTOLIC BLOOD PRESSURE: 80 MMHG | BODY MASS INDEX: 43.3 KG/M2 | WEIGHT: 268.31 LBS | SYSTOLIC BLOOD PRESSURE: 138 MMHG

## 2020-02-19 DIAGNOSIS — O09.93 SUPERVISION OF HIGH RISK PREGNANCY IN THIRD TRIMESTER: Primary | ICD-10-CM

## 2020-02-19 DIAGNOSIS — O99.213 OBESITY AFFECTING PREGNANCY IN THIRD TRIMESTER: ICD-10-CM

## 2020-02-19 PROCEDURE — 99999 PR PBB SHADOW E&M-EST. PATIENT-LVL II: ICD-10-PCS | Mod: PBBFAC,,, | Performed by: ADVANCED PRACTICE MIDWIFE

## 2020-02-19 PROCEDURE — 99213 OFFICE O/P EST LOW 20 MIN: CPT | Mod: TH,S$PBB,, | Performed by: ADVANCED PRACTICE MIDWIFE

## 2020-02-19 PROCEDURE — 99213 PR OFFICE/OUTPT VISIT, EST, LEVL III, 20-29 MIN: ICD-10-PCS | Mod: TH,S$PBB,, | Performed by: ADVANCED PRACTICE MIDWIFE

## 2020-02-19 PROCEDURE — 99999 PR PBB SHADOW E&M-EST. PATIENT-LVL II: CPT | Mod: PBBFAC,,, | Performed by: ADVANCED PRACTICE MIDWIFE

## 2020-02-19 PROCEDURE — 99212 OFFICE O/P EST SF 10 MIN: CPT | Mod: PBBFAC,TH | Performed by: ADVANCED PRACTICE MIDWIFE

## 2020-02-19 NOTE — PROGRESS NOTES
Still has synthesis pain Has not been seen by PT Will go to department today  4 pound weight gain noted  Wearing maternity belt  Flexeril helping but pain continues  Had PIH workup all normal  Will order nexplanon today  Begin BPP in 2 weeks  Dr Babb in 3 weeks to schedule c section

## 2020-02-20 ENCOUNTER — TELEPHONE (OUTPATIENT)
Dept: OBSTETRICS AND GYNECOLOGY | Facility: HOSPITAL | Age: 25
End: 2020-02-20

## 2020-02-23 ENCOUNTER — TELEPHONE (OUTPATIENT)
Dept: OBSTETRICS AND GYNECOLOGY | Facility: HOSPITAL | Age: 25
End: 2020-02-23

## 2020-02-23 NOTE — TELEPHONE ENCOUNTER
Pt called from work, states she just took her BP and in was 144/88, no complaints of any CNS symptoms, informed pt that BP was WNL but she should rest for 30min and retake, instructed to come in for further evaluation if started with any CNS symptoms or if BP is consistantly elevated, pt verbalized understanding

## 2020-02-27 ENCOUNTER — TELEPHONE (OUTPATIENT)
Dept: OBSTETRICS AND GYNECOLOGY | Facility: CLINIC | Age: 25
End: 2020-02-27

## 2020-02-27 NOTE — TELEPHONE ENCOUNTER
"Patient advised that her symptoms could be Gaston Garza and round ligament.  Patient stated she drinks lots of water, has belly band, advised to take Tylenol for pain, warm baths, warm heating pad, pelvic rest.  Patient stated "I do all of that."  Patient stated she also called L&D and was told the same things.  Patient encouraged to discuss concerns with provider at her next visit and verbalized understanding.  "

## 2020-02-27 NOTE — TELEPHONE ENCOUNTER
----- Message from Shorty Gordon sent at 2/27/2020 11:42 AM CST -----  Contact: pt  Type:  Needs Medical Advice    Who Called: ELVIRA POP   Symptoms (please be specific): cramping in pt back 1 week  ,discharge 1 week  and pelvic pains for months   How long has patient had these symptoms:    Pharmacy name and phone #:     Would the patient rather a call back or a response via [facility]? call  Best Call Back Number:  [phone] (home)    Additional Information:

## 2020-03-05 ENCOUNTER — PROCEDURE VISIT (OUTPATIENT)
Dept: OBSTETRICS AND GYNECOLOGY | Facility: CLINIC | Age: 25
End: 2020-03-05
Payer: MEDICAID

## 2020-03-05 ENCOUNTER — ROUTINE PRENATAL (OUTPATIENT)
Dept: OBSTETRICS AND GYNECOLOGY | Facility: CLINIC | Age: 25
End: 2020-03-05
Payer: MEDICAID

## 2020-03-05 VITALS — DIASTOLIC BLOOD PRESSURE: 60 MMHG | WEIGHT: 265 LBS | SYSTOLIC BLOOD PRESSURE: 114 MMHG | BODY MASS INDEX: 42.77 KG/M2

## 2020-03-05 DIAGNOSIS — O99.013 ANEMIA AFFECTING PREGNANCY IN THIRD TRIMESTER: Primary | ICD-10-CM

## 2020-03-05 DIAGNOSIS — O13.3 GESTATIONAL HYPERTENSION WITHOUT SIGNIFICANT PROTEINURIA IN THIRD TRIMESTER: ICD-10-CM

## 2020-03-05 DIAGNOSIS — O99.213 OBESITY AFFECTING PREGNANCY IN THIRD TRIMESTER: ICD-10-CM

## 2020-03-05 DIAGNOSIS — O09.93 SUPERVISION OF HIGH RISK PREGNANCY IN THIRD TRIMESTER: ICD-10-CM

## 2020-03-05 PROBLEM — O23.42 UTI (URINARY TRACT INFECTION) DURING PREGNANCY, SECOND TRIMESTER: Status: RESOLVED | Noted: 2020-01-04 | Resolved: 2020-03-05

## 2020-03-05 PROCEDURE — 76819 FETAL BIOPHYS PROFIL W/O NST: CPT | Mod: 26,S$PBB,, | Performed by: OBSTETRICS & GYNECOLOGY

## 2020-03-05 PROCEDURE — 99213 PR OFFICE/OUTPT VISIT, EST, LEVL III, 20-29 MIN: ICD-10-PCS | Mod: TH,S$PBB,, | Performed by: ADVANCED PRACTICE MIDWIFE

## 2020-03-05 PROCEDURE — 76816 PR  US,PREGNANT UTERUS,F/U,TRANSABD APP: ICD-10-PCS | Mod: 26,S$PBB,, | Performed by: OBSTETRICS & GYNECOLOGY

## 2020-03-05 PROCEDURE — 99213 OFFICE O/P EST LOW 20 MIN: CPT | Mod: PBBFAC,TH | Performed by: ADVANCED PRACTICE MIDWIFE

## 2020-03-05 PROCEDURE — 99999 PR PBB SHADOW E&M-EST. PATIENT-LVL III: CPT | Mod: PBBFAC,,, | Performed by: ADVANCED PRACTICE MIDWIFE

## 2020-03-05 PROCEDURE — 76816 OB US FOLLOW-UP PER FETUS: CPT | Mod: PBBFAC | Performed by: OBSTETRICS & GYNECOLOGY

## 2020-03-05 PROCEDURE — 76816 OB US FOLLOW-UP PER FETUS: CPT | Mod: 26,S$PBB,, | Performed by: OBSTETRICS & GYNECOLOGY

## 2020-03-05 PROCEDURE — 76819 PR US, OB, FETAL BIOPHYSICAL, W/O NST: ICD-10-PCS | Mod: 26,S$PBB,, | Performed by: OBSTETRICS & GYNECOLOGY

## 2020-03-05 PROCEDURE — 76819 FETAL BIOPHYS PROFIL W/O NST: CPT | Mod: PBBFAC | Performed by: OBSTETRICS & GYNECOLOGY

## 2020-03-05 PROCEDURE — 99213 OFFICE O/P EST LOW 20 MIN: CPT | Mod: TH,S$PBB,, | Performed by: ADVANCED PRACTICE MIDWIFE

## 2020-03-05 PROCEDURE — 99999 PR PBB SHADOW E&M-EST. PATIENT-LVL III: ICD-10-PCS | Mod: PBBFAC,,, | Performed by: ADVANCED PRACTICE MIDWIFE

## 2020-03-05 NOTE — PROGRESS NOTES
" Normotensive.  States taking baby aspirin intermittently, secondary to nausea.  Has Zofran but uses sparingly as worried about constipation.  Has Phenergan syrup that sometimes makes her nauseous and then she uses the suppositories intermittently.  4 lb weight loss is noted.    Diet recall, tolerates Canes, advised to "graze" eat.  Advised boost with protein daily  Anemia - hemoglobin 8.1 to 8.9, states unable to  Take iron daily therefore referred to Heme-Onc as she will be a scheduled repeat  section.Declines BTL   history HSV-no outbreak  Ultrasound today -vertex, anterior placenta, MVP 5.3, KATIE 15.9, BPP 8 8, EFW 4 lb 6 oz at 24 percentile-reassuring    "

## 2020-03-09 ENCOUNTER — HOSPITAL ENCOUNTER (OUTPATIENT)
Facility: HOSPITAL | Age: 25
Discharge: HOME | End: 2020-03-09
Attending: OBSTETRICS & GYNECOLOGY | Admitting: OBSTETRICS & GYNECOLOGY
Payer: MEDICAID

## 2020-03-09 VITALS
HEART RATE: 85 BPM | TEMPERATURE: 99 F | OXYGEN SATURATION: 93 % | SYSTOLIC BLOOD PRESSURE: 123 MMHG | DIASTOLIC BLOOD PRESSURE: 63 MMHG

## 2020-03-09 DIAGNOSIS — O36.8130 DECREASED FETAL MOVEMENTS IN THIRD TRIMESTER, SINGLE OR UNSPECIFIED FETUS: ICD-10-CM

## 2020-03-09 DIAGNOSIS — O36.8190 DECREASED FETAL MOVEMENT: Primary | ICD-10-CM

## 2020-03-09 PROCEDURE — 59025 FETAL NON-STRESS TEST: CPT

## 2020-03-09 PROCEDURE — 25000003 PHARM REV CODE 250: Performed by: MIDWIFE

## 2020-03-09 PROCEDURE — 59025 OBTAIN FETAL NONSTRESS TEST (NST): ICD-10-PCS | Mod: 26,S$PBB,, | Performed by: MIDWIFE

## 2020-03-09 PROCEDURE — 99213 PR OFFICE/OUTPT VISIT, EST, LEVL III, 20-29 MIN: ICD-10-PCS | Mod: TH,25,S$PBB, | Performed by: MIDWIFE

## 2020-03-09 PROCEDURE — 99211 OFF/OP EST MAY X REQ PHY/QHP: CPT | Mod: 25,TH

## 2020-03-09 PROCEDURE — 59025 FETAL NON-STRESS TEST: CPT | Mod: 26,S$PBB,, | Performed by: MIDWIFE

## 2020-03-09 PROCEDURE — 99213 OFFICE O/P EST LOW 20 MIN: CPT | Mod: TH,25,S$PBB, | Performed by: MIDWIFE

## 2020-03-09 RX ORDER — SODIUM CHLORIDE 9 MG/ML
INJECTION, SOLUTION INTRAVENOUS CONTINUOUS
Status: DISCONTINUED | OUTPATIENT
Start: 2020-03-09 | End: 2020-03-09 | Stop reason: HOSPADM

## 2020-03-09 RX ORDER — NAPROXEN SODIUM 220 MG/1
81 TABLET, FILM COATED ORAL DAILY
Qty: 30 TABLET | Refills: 2 | Status: ON HOLD | OUTPATIENT
Start: 2020-03-09 | End: 2020-03-23 | Stop reason: HOSPADM

## 2020-03-09 RX ORDER — ONDANSETRON 8 MG/1
8 TABLET, ORALLY DISINTEGRATING ORAL EVERY 8 HOURS PRN
Status: DISCONTINUED | OUTPATIENT
Start: 2020-03-09 | End: 2020-03-09 | Stop reason: HOSPADM

## 2020-03-09 RX ORDER — ACETAMINOPHEN 500 MG
500 TABLET ORAL EVERY 6 HOURS PRN
Status: DISCONTINUED | OUTPATIENT
Start: 2020-03-09 | End: 2020-03-09 | Stop reason: HOSPADM

## 2020-03-09 RX ADMIN — ACETAMINOPHEN 500 MG: 500 TABLET ORAL at 07:03

## 2020-03-10 NOTE — PROGRESS NOTES
"Pt presents to L&D triage for decreased fetal movement; FHTs 150s with moderate variability and accels.  CNM at bedside; pt states she's been stressed, says she's "been yelling a lot at home".  Pt denies physical abuse by stating "no one's been physical with me, it's not like that".  CNM verified, and pt said, "I promise".  Pt reports headache, denies right upper quadrant pain, blurry vision, seeing spots before her eyes.  Will continue to monitor.   "

## 2020-03-10 NOTE — SUBJECTIVE & OBJECTIVE
"Obstetric HPI:  Patient reports None contractions, decreased  fetal movement, No vaginal bleeding , No loss of fluid. Now reports active fetal movement. States, "I have been stressed, and just wanted to check on her."     This pregnancy has been complicated by HSV, MDD, anxiety, anemia, Hx of suicidal ideation, Hx of pre-e, HTN, previous c/sx1.    OB History    Para Term  AB Living   2 1 1 0 0 1   SAB TAB Ectopic Multiple Live Births   0 0 0 0 1      # Outcome Date GA Lbr Goldy/2nd Weight Sex Delivery Anes PTL Lv   2 Current            1 Term 17 37w1d  2.835 kg (6 lb 4 oz) M CS-LTranv Gen N BETTINA      Complications: Failure to Progress in First Stage      Name: VANESA POP      Apgar1: 7  Apgar5: 9     Past Medical History:   Diagnosis Date    Abnormal Pap smear of cervix     Acute viral syndrome 2020    Herpes simplex virus (HSV) infection     Pre-eclampsia in third trimester 2017     Past Surgical History:   Procedure Laterality Date     SECTION      MOUTH SURGERY         PTA Medications   Medication Sig    docusate sodium (COLACE) 100 MG capsule Take 1 capsule (100 mg total) by mouth 2 (two) times daily.    ferrous sulfate 325 (65 FE) MG EC tablet Take 1 tablet (325 mg total) by mouth 2 (two) times daily.    ondansetron (ZOFRAN) 4 MG tablet     aspirin 81 MG Chew Take 1 tablet (81 mg total) by mouth once daily.    prenatal vits62/FA/om3/dha/epa (PRENATAL GUMMY ORAL) Take by mouth.    promethazine (PHENERGAN) 25 MG suppository Place 1 suppository (25 mg total) rectally every 6 (six) hours as needed for Nausea.       Review of patient's allergies indicates:  No Known Allergies     Family History     Problem Relation (Age of Onset)    Colon cancer Maternal Grandfather    Diabetes Maternal Grandmother    Heart disease Maternal Grandmother    Hypertension Maternal Grandmother        Tobacco Use    Smoking status: Never Smoker    Smokeless tobacco: Never Used " "  Substance and Sexual Activity    Alcohol use: Not Currently     Frequency: Monthly or less     Drinks per session: 1 or 2     Binge frequency: Never     Comment: social use     Drug use: Yes     Types: Marijuana     Comment: Last used in August 2019    Sexual activity: Yes     Partners: Male     Birth control/protection: None     Review of Systems   Constitutional: Negative.    Eyes: Negative.  Negative for visual disturbance.   Respiratory: Negative.    Cardiovascular: Negative.  Negative for leg swelling.   Gastrointestinal: Negative.  Negative for abdominal pain.   Genitourinary: Negative.  Negative for vaginal bleeding and vaginal discharge.   Neurological: Positive for headaches.        States has mild headache, has not taken any meds. Would like Tylenol.    Psychiatric/Behavioral: Negative for depression.        States has been "hormonal and stressed." Denies feeling depressed, or thoughts of self harm or suicide.      Objective:     Vital Signs (Most Recent):  Temp: 98.5 °F (36.9 °C) (03/09/20 1842) Vital Signs (24h Range):  Temp:  [98.5 °F (36.9 °C)] 98.5 °F (36.9 °C)        There is no height or weight on file to calculate BMI.    FHT: Cat 1 (reassuring) accels present, no decels  TOCO:  none    Physical Exam    Cervix:  Dilation:  deferred     Significant Labs:  Lab Results   Component Value Date    GROUPTRH O POS 10/09/2019    HEPBSAG Negative 12/08/2019    STREPBCULT STREPTOCOCCUS AGALACTIAE (GROUP B) 07/18/2017       I have personallly reviewed all pertinent lab results from the last 24 hours.  "

## 2020-03-10 NOTE — DISCHARGE SUMMARY
[facility] -   Obstetrics  Discharge Summary      Patient Name: Juliet Jalloh  MRN: [id number]  Admission Date: 3/9/2020  Hospital Length of Stay: 0 days  Discharge Date and Time:  2020 7:16 PM  Attending Physician: Mary Vazquez MD   Discharging Provider: Marily Sheehan CNM   Primary Care Provider: Primary Doctor No    HPI:  33.2 to L&D with c/o decreased fetal movement    FHT: Cat 1 (reassuring)  TOCO:  none    * No surgery found *     Hospital Course:   OB triage  NST reactive  Mom now reports baby is active  D/c to home, kick counts         Final Active Diagnoses:    Diagnosis Date Noted POA    PRINCIPAL PROBLEM:  Decreased fetal movement [O36.8190] 2020 Yes      Problems Resolved During this Admission:              Immunizations     None          This patient has no babies on file.  Pending Diagnostic Studies:     None          Discharged Condition: good    Disposition: Home or Self Care    Follow Up:  Follow-up Information     Wendy Payan CNM On 3/13/2020.    Specialty:  Obstetrics and Gynecology  Why:  OB appointment  Contact information:  [id number] [place] [id number] [phone]                 Patient Instructions:      Activity as tolerated     Medications:  Current Discharge Medication List      CONTINUE these medications which have CHANGED    Details   aspirin 81 MG Chew Take 1 tablet (81 mg total) by mouth once daily.  Qty: 30 tablet, Refills: 2         CONTINUE these medications which have NOT CHANGED    Details   docusate sodium (COLACE) 100 MG capsule Take 1 capsule (100 mg total) by mouth 2 (two) times daily.  Qty: 30 capsule, Refills: 1      ferrous sulfate 325 (65 FE) MG EC tablet Take 1 tablet (325 mg total) by mouth 2 (two) times daily.  Qty: 60 tablet, Refills: 3      ondansetron (ZOFRAN) 4 MG tablet       prenatal vits62/FA/om3/dha/epa (PRENATAL GUMMY ORAL) Take by mouth.      promethazine (PHENERGAN) 25 MG suppository Place 1  suppository (25 mg total) rectally every 6 (six) hours as needed for Nausea.  Qty: 12 suppository, Refills: 1             Marily Sheehan CNM  Obstetrics  [facility]

## 2020-03-10 NOTE — DISCHARGE INSTRUCTIONS
Discharge Instructions    Self Care Instructions:    Diet:  · Eat from the five basic food groups  · Fruits and proteins are good choices  · Limit fast foods and added salt/sugar  · Moderate carbonated and caffeine drinks    Hydration:  · Drink at least 8 large glasses of water a day    Kick Counts:  · After a meal, rest on your side and note the baby's movements until you have 8-10 movements in a 2 hour counting period.    · If you do not feel your baby move 8-10 times within 2 hours or you sense a change in the type or character of the baby's movement, you should come in to the hospital at once.  · Remember; your baby can sleep for 20-40 minutes at a time.      When to notify your provider:    · Vaginal bleeding like a period;  You may spot if we examined your cervix.  · If your water breaks, come to the birth center.  Note time, color and odor.  · Abdominal tenderness or pain that does not go away  · Contractions every 3 to 5 minutes for 1 to 2 hours.  True contractions move from front to back, are regular; usually get longer, stronger and closer together and do not stop if you change your position or activity.  · Any burning, urgency or frequency in relation to emptying your bladder.  · Any temperature greater than 100.4 degrees, chills, flu-like symptoms    [phone]

## 2020-03-10 NOTE — PROCEDURES
Juliet Jalloh is a 24 y.o. female patient.    Temp: 98.5 °F (36.9 °C) (03/09/20 1842)       Obtain Fetal nonstress test (NST)  Date/Time: 3/9/2020 7:12 PM  Performed by: Marily Sheehan CNM  Authorized by: Marily Sheehan CNM     Nonstress Test:     Variability:  6-25 BPM    Decelerations:  None    Accelerations:  15 bpm    Baseline:  145    Uterine Irritability: No      Contractions:  Not present  Biophysical Profile:     Nonstress Test Interpretation: reactive      Overall Impression:  Reassuring  Post-procedure:     Patient tolerance:  Patient tolerated the procedure well with no immediate complications        Marily Sheehan  3/9/2020

## 2020-03-10 NOTE — H&P
"[facility] -   Obstetrics  History & Physical    Patient Name: Juliet Pop  MRN: [id number]  Admission Date: 3/9/2020  Primary Care Provider: Primary Doctor No    Subjective:     Principal Problem:Decreased fetal movement    History of Present Illness:   33.2 to L&D with c/o decreased fetal movement    Obstetric HPI:  Patient reports None contractions, decreased  fetal movement, No vaginal bleeding , No loss of fluid. Now reports active fetal movement. States, "I have been stressed, and just wanted to check on her."     This pregnancy has been complicated by HSV, MDD, anxiety, anemia, Hx of suicidal ideation, Hx of pre-e, HTN, previous c/sx1.    OB History    Para Term  AB Living   2 1 1 0 0 1   SAB TAB Ectopic Multiple Live Births   0 0 0 0 1      # Outcome Date GA Lbr Goldy/2nd Weight Sex Delivery Anes PTL Lv   2 Current            1 Term 17 37w1d  2.835 kg (6 lb 4 oz) M CS-LTranv Gen N BETTINA      Complications: Failure to Progress in First Stage      Name: VANESA POP      Apgar1: 7  Apgar5: 9     Past Medical History:   Diagnosis Date    Abnormal Pap smear of cervix     Acute viral syndrome 2020    Herpes simplex virus (HSV) infection     Pre-eclampsia in third trimester 2017     Past Surgical History:   Procedure Laterality Date     SECTION      MOUTH SURGERY         PTA Medications   Medication Sig    docusate sodium (COLACE) 100 MG capsule Take 1 capsule (100 mg total) by mouth 2 (two) times daily.    ferrous sulfate 325 (65 FE) MG EC tablet Take 1 tablet (325 mg total) by mouth 2 (two) times daily.    ondansetron (ZOFRAN) 4 MG tablet     aspirin 81 MG Chew Take 1 tablet (81 mg total) by mouth once daily.    prenatal vits62/FA/om3/dha/epa (PRENATAL GUMMY ORAL) Take by mouth.    promethazine (PHENERGAN) 25 MG suppository Place 1 suppository (25 mg total) rectally every 6 (six) hours as needed for Nausea.       Review of patient's " "allergies indicates:  No Known Allergies     Family History     Problem Relation (Age of Onset)    Colon cancer Maternal Grandfather    Diabetes Maternal Grandmother    Heart disease Maternal Grandmother    Hypertension Maternal Grandmother        Tobacco Use    Smoking status: Never Smoker    Smokeless tobacco: Never Used   Substance and Sexual Activity    Alcohol use: Not Currently     Frequency: Monthly or less     Drinks per session: 1 or 2     Binge frequency: Never     Comment: social use     Drug use: Yes     Types: Marijuana     Comment: Last used in 2019    Sexual activity: Yes     Partners: Male     Birth control/protection: None     Review of Systems   Constitutional: Negative.    Eyes: Negative.  Negative for visual disturbance.   Respiratory: Negative.    Cardiovascular: Negative.  Negative for leg swelling.   Gastrointestinal: Negative.  Negative for abdominal pain.   Genitourinary: Negative.  Negative for vaginal bleeding and vaginal discharge.   Neurological: Positive for headaches.        States has mild headache, has not taken any meds. Would like Tylenol.    Psychiatric/Behavioral: Negative for depression.        States has been "hormonal and stressed." Denies feeling depressed, or thoughts of self harm or suicide.      Objective:     Vital Signs (Most Recent):  Temp: 98.5 °F (36.9 °C) (20 1842) Vital Signs (24h Range):  Temp:  [98.5 °F (36.9 °C)] 98.5 °F (36.9 °C)        There is no height or weight on file to calculate BMI.    FHT: Cat 1 (reassuring) accels present, no decels  TOCO:  none    Physical Exam    Cervix:  Dilation:  deferred     Significant Labs:  Lab Results   Component Value Date    GROUPTRH O POS 10/09/2019    HEPBSAG Negative 2019    STREPBCULT STREPTOCOCCUS AGALACTIAE (GROUP B) 2017       I have personallly reviewed all pertinent lab results from the last 24 hours.    Assessment/Plan:     24 y.o. female  at 33w2d for:    No notes have been " filed under this hospital service.  Service: Obstetrics and Gynecology      Marily I Branch, CATHERINEM  Obstetrics  [facility]

## 2020-03-13 ENCOUNTER — TELEPHONE (OUTPATIENT)
Dept: OBSTETRICS AND GYNECOLOGY | Facility: CLINIC | Age: 25
End: 2020-03-13

## 2020-03-13 ENCOUNTER — CLINICAL SUPPORT (OUTPATIENT)
Dept: REHABILITATION | Facility: HOSPITAL | Age: 25
End: 2020-03-13
Attending: ADVANCED PRACTICE MIDWIFE
Payer: MEDICAID

## 2020-03-13 ENCOUNTER — ROUTINE PRENATAL (OUTPATIENT)
Dept: OBSTETRICS AND GYNECOLOGY | Facility: CLINIC | Age: 25
End: 2020-03-13
Payer: MEDICAID

## 2020-03-13 ENCOUNTER — TELEPHONE (OUTPATIENT)
Dept: HEMATOLOGY/ONCOLOGY | Facility: CLINIC | Age: 25
End: 2020-03-13

## 2020-03-13 ENCOUNTER — PROCEDURE VISIT (OUTPATIENT)
Dept: OBSTETRICS AND GYNECOLOGY | Facility: CLINIC | Age: 25
End: 2020-03-13
Payer: MEDICAID

## 2020-03-13 VITALS
BODY MASS INDEX: 42.42 KG/M2 | WEIGHT: 262.81 LBS | SYSTOLIC BLOOD PRESSURE: 118 MMHG | DIASTOLIC BLOOD PRESSURE: 78 MMHG

## 2020-03-13 DIAGNOSIS — R10.2 PELVIC PAIN AFFECTING PREGNANCY IN THIRD TRIMESTER, ANTEPARTUM: ICD-10-CM

## 2020-03-13 DIAGNOSIS — M53.3 SACROILIAC JOINT PAIN: Primary | ICD-10-CM

## 2020-03-13 DIAGNOSIS — B00.9 HERPES SIMPLEX VIRUS (HSV) INFECTION: ICD-10-CM

## 2020-03-13 DIAGNOSIS — O34.219 PREVIOUS CESAREAN DELIVERY AFFECTING PREGNANCY, ANTEPARTUM: Primary | ICD-10-CM

## 2020-03-13 DIAGNOSIS — O14.90 PRE-ECLAMPSIA AFFECTING PREGNANCY, ANTEPARTUM: ICD-10-CM

## 2020-03-13 DIAGNOSIS — O09.93 SUPERVISION OF HIGH RISK PREGNANCY IN THIRD TRIMESTER: ICD-10-CM

## 2020-03-13 DIAGNOSIS — O26.893 PELVIC PAIN AFFECTING PREGNANCY IN THIRD TRIMESTER, ANTEPARTUM: ICD-10-CM

## 2020-03-13 DIAGNOSIS — O99.213 OBESITY AFFECTING PREGNANCY IN THIRD TRIMESTER: ICD-10-CM

## 2020-03-13 PROCEDURE — 99213 OFFICE O/P EST LOW 20 MIN: CPT | Mod: PBBFAC,TH | Performed by: ADVANCED PRACTICE MIDWIFE

## 2020-03-13 PROCEDURE — 76816 PR  US,PREGNANT UTERUS,F/U,TRANSABD APP: ICD-10-PCS | Mod: 26,S$PBB,, | Performed by: OBSTETRICS & GYNECOLOGY

## 2020-03-13 PROCEDURE — 99999 PR PBB SHADOW E&M-EST. PATIENT-LVL III: CPT | Mod: PBBFAC,,, | Performed by: ADVANCED PRACTICE MIDWIFE

## 2020-03-13 PROCEDURE — 99213 OFFICE O/P EST LOW 20 MIN: CPT | Mod: TH,S$PBB,, | Performed by: ADVANCED PRACTICE MIDWIFE

## 2020-03-13 PROCEDURE — 76816 OB US FOLLOW-UP PER FETUS: CPT | Mod: PBBFAC | Performed by: OBSTETRICS & GYNECOLOGY

## 2020-03-13 PROCEDURE — 76816 OB US FOLLOW-UP PER FETUS: CPT | Mod: 26,S$PBB,, | Performed by: OBSTETRICS & GYNECOLOGY

## 2020-03-13 PROCEDURE — 99213 PR OFFICE/OUTPT VISIT, EST, LEVL III, 20-29 MIN: ICD-10-PCS | Mod: TH,S$PBB,, | Performed by: ADVANCED PRACTICE MIDWIFE

## 2020-03-13 PROCEDURE — 97110 THERAPEUTIC EXERCISES: CPT | Performed by: PHYSICAL THERAPIST

## 2020-03-13 PROCEDURE — 97140 MANUAL THERAPY 1/> REGIONS: CPT | Performed by: PHYSICAL THERAPIST

## 2020-03-13 PROCEDURE — 76819 FETAL BIOPHYS PROFIL W/O NST: CPT | Mod: 26,S$PBB,, | Performed by: OBSTETRICS & GYNECOLOGY

## 2020-03-13 PROCEDURE — 99999 PR PBB SHADOW E&M-EST. PATIENT-LVL III: ICD-10-PCS | Mod: PBBFAC,,, | Performed by: ADVANCED PRACTICE MIDWIFE

## 2020-03-13 PROCEDURE — 76819 PR US, OB, FETAL BIOPHYSICAL, W/O NST: ICD-10-PCS | Mod: 26,S$PBB,, | Performed by: OBSTETRICS & GYNECOLOGY

## 2020-03-13 PROCEDURE — 76819 FETAL BIOPHYS PROFIL W/O NST: CPT | Mod: PBBFAC | Performed by: OBSTETRICS & GYNECOLOGY

## 2020-03-13 PROCEDURE — 97161 PT EVAL LOW COMPLEX 20 MIN: CPT | Performed by: PHYSICAL THERAPIST

## 2020-03-13 NOTE — PROGRESS NOTES
Normotensive, occasionally takes baby aspirin.    Complaining of constipation, still using Zofran occasion advised that this may exacerbated.  Encouraged good diet plenty water.  States has not had a BM for 1 week.  Advised to consider Dulcolax or a Fleet.  Lost a couple of lb states that she taking boost    ultrasound -vertex, anterior placenta, MVP 5.5 cm, KATIE 10.7 cm, BPP 8 8-reassuring  Hem/Onc appointment rescheduled from April to March 16 as she states she is not taking her iron.    Demonstrates difficulty ambulating secondary to pelvic pain in the suprapubic area -referred to PT.    History of LT CS desiring repeat - has appointment scheduled with OBGYN.    Labor precautions and kick counts reviewed   history of HSV noted - start Valtrex suppression protocol 35 weeks

## 2020-03-13 NOTE — PATIENT INSTRUCTIONS
Pelvic Pain in Pregnancy: Unclear (2-3 Trimester)  You are well into your pregnancy and are having pain and pressure in your pelvic area. This is your lower belly (abdomen). Mild pelvic pressure or heaviness is very common in the latter stages of a healthy pregnancy. This is due to the growing uterus (womb). Although the exact cause of your pain is not certain, it does not appear to be dangerous. It may be due to ligaments in your belly stretching to support your uterus as it grows. The weight of your baby may also be causing pressure and pain. Pain can be caused by the bones of your pelvis shifting as your body makes room for the baby to pass through. This is known as symphysis pubis dysfunction (SPD). SPD can cause quite a bit of pain and discomfort as the due date nears.     Pain in the low back is common during pregnancy.   Home care  The following are general care guidelines:  · Avoid strenuous activities and long periods of standing. Bed rest is not needed unless your doctor has recommended it.  · Exercise for 30 minutes all or most days of the week. This will promote muscle tone, strength, and endurance. Ask your healthcare provider what exercises to do and to avoid.  · Sit in a warm (NOT hot) bath. This helps relax tight, painful muscles.  · Sleep on your side with a pillow between your legs. This helps align your pelvis.  · Eat frequent, light meals. Choose foods that are easy to digest.  · Ask your doctor whether a pregnancy support belt could help you.  · If told to by your healthcare provider, take an over-the-counter medicine, such as acetaminophen, to relieve pain. Follow instructions carefully for how much to take and how often to take it. Do not take aspirin or nonsteroidal anti-inflammatory medicines (like ibuprofen) unless you have been told to do so by your healthcare provider.  Follow-up care  Follow up with your healthcare provider, or as advised.  When to seek medical advice  Call your  healthcare provider right away if any of these occur:   · Sudden or gradual worsening abdominal pain  · Fainting, dizziness, or weakness when standing  · Any vaginal bleeding  · Leakage of fluid from the vagina  · Decreased fetal motion  · Repeated vomiting or diarrhea  · Pain that seems to settle in one area, especially the lower right abdomen  · Blood in vomit or bowel movements (dark red or black color)  · Fever of 100.4°F (38°C) or higher  Date Last Reviewed: 6/1/2016 © 2000-2017 [employer]. [address] [place] [id number]. All rights reserved. This information is not intended as a substitute for professional medical care. Always follow your healthcare professional's instructions.        Pelvic Pain, Uncertain Cause    Pelvic pain is pain felt in the lowest part of the belly (abdomen) and between the hipbones. The pain may be acute. This means it occurred suddenly and recently. Or the pain may be chronic. This means it has occurred for 6 months or longer.  There are many possible causes of pelvic pain. The pain may be due to a problem in the female reproductive system (pictured here). Or, it may be due to a problem in the digestive, urinary, or musculoskeletal systems.  Based on your visit today, the exact cause of your pelvic pain is not certain. Your condition does not appear to be serious at this time. But it is important for you to keep watching for any new symptoms or worsening of your condition.  General care  Your healthcare provider may advise a number of ways to help manage your pain. These can include:  · Taking over-the-counter pain medicine. Stronger pain medicine may also be prescribed, if needed.  · Applying heat to the pelvic area. Use a heating pad or a hot pack. Taking a hot bath may also help.  · Getting plenty of rest.  · Making certain lifestyle changes. These can include practicing good posture and getting regular exercise. (Studies have shown that these changes  help reduce pelvic pain in some women.)  · Seeing a physical therapist or pain specialist. These healthcare providers can discuss other ways to manage pain with you.  Follow-up care  Follow up with your healthcare provider, or as advised.   When to seek medical advice  Call your healthcare provider right away if any of the following occur:  · Fever of 100.4°F or higher, or as directed by your healthcare provider  · Pain worsens or you have sudden, severe pain or new pain  · Nausea, vomiting, sweating, or restlessness  · Dizziness or fainting  · Unusual vaginal discharge  · Abnormal vaginal bleeding (especially bleeding after menopause)  Date Last Reviewed: 6/11/2015  © 9290-3813 [employer]. [address] [place] [address], [place] [address]. All rights reserved. This information is not intended as a substitute for professional medical care. Always follow your healthcare professional's instructions.        Kick Counts    Its normal to worry about your babys health. One way you can know your babys doing well is to record the babys movements once a day. This is called a kick count. Remember to take your kick count records to all your appointments with your healthcare provider.  How to count kicks  Here are tips for counting kicks:  · Choose a time when the baby is active, such as after a meal.   · Sit comfortably or lie on your side.   · The first time the baby moves, write down the time.   · Count each movement until the baby has moved 10 times. This can take from 20 minutes to 2 hours.   · Try to do it at the same time each day.  When to call your healthcare provider  Call your healthcare provider right away if you notice any of the following:  · Your baby moves fewer than 10 times in 2 hours while youre doing kick counts.  · Your baby moves much less often than on the days before.  · You have not felt your baby move all day.  Date Last Reviewed: 8/5/2015 © 2000-2017 [employer] [address] [id number]. All rights reserved. This information is not intended as a substitute for professional medical care. Always follow your healthcare professional's instructions.        Recognizing Labor    The beginning of labor is the beginning of birth. Youll start to feel strong contractions. Thats when the muscles of your uterus tighten up to help push your baby out during birth.  Yes, labor has probably started   Signs of labor include:  · Your contractions are getting stronger and more painful instead of weaker. Youll probably feel them throughout your whole uterus.  · Your contractions are regular. This means that you feel them about every 5 to 10 minutes. And they are getting closer together.  · You have pink-colored or blood-streaked fluid from your vagina.  · Your water breaks. It may be a gush or a slow trickle of clear fluid from your vagina.  No, its probably not real labor   Signs of false labor include:  · Your contractions arent regular or strong.  · You feel the contractions only in your lower uterus.  · Your contractions go away when you walk or change position.  · Your contractions go away after drinking fluids.  When to call your healthcare provider  Call your healthcare provider or clinic right away if you notice any of these signs:  · Fluid from your vagina, with or without contractions.  · Bleeding heavy enough that you need a sanitary pad.  · You dont feel your baby moving as much as before.     NOTE: Contractions are timed by both of these measures:  · The length of each contraction from its start to its finish.  · How far apart the contractions are -- the time between the start of one contraction and the start of the next contraction.   Date Last Reviewed: 8/9/2015  © 1053-6163 [employer]. [address] [place] [id number]. All rights reserved. This information is not intended as a substitute for professional medical care. Always follow your  healthcare professional's instructions.

## 2020-03-13 NOTE — TELEPHONE ENCOUNTER
----- Message from Emily Acevedo MA sent at 3/13/2020  2:17 PM CDT -----  Good afternoon,    We attempted to reach this patient in regards to a request for appt through your office. Please let the patient know that she has been scheduled with Dr. Dodd on 3/17/20 @ 2 pm  [facility] on the 4th.    Thanks,  Emily

## 2020-03-16 ENCOUNTER — HOSPITAL ENCOUNTER (INPATIENT)
Facility: HOSPITAL | Age: 25
LOS: 7 days | Discharge: HOME | End: 2020-03-23
Attending: OBSTETRICS & GYNECOLOGY | Admitting: OBSTETRICS & GYNECOLOGY
Payer: MEDICAID

## 2020-03-16 DIAGNOSIS — O99.013 ANEMIA AFFECTING PREGNANCY IN THIRD TRIMESTER: ICD-10-CM

## 2020-03-16 DIAGNOSIS — F33.2 SEVERE EPISODE OF RECURRENT MAJOR DEPRESSIVE DISORDER, WITHOUT PSYCHOTIC FEATURES: ICD-10-CM

## 2020-03-16 DIAGNOSIS — O14.90 PRE-ECLAMPSIA AFFECTING PREGNANCY, ANTEPARTUM: ICD-10-CM

## 2020-03-16 DIAGNOSIS — F41.9 ANXIETY DISORDER, UNSPECIFIED TYPE: ICD-10-CM

## 2020-03-16 DIAGNOSIS — B00.9 HERPES SIMPLEX VIRUS (HSV) INFECTION: ICD-10-CM

## 2020-03-16 DIAGNOSIS — O34.219 PREVIOUS CESAREAN DELIVERY AFFECTING PREGNANCY, ANTEPARTUM: Primary | ICD-10-CM

## 2020-03-16 DIAGNOSIS — Z98.891 STATUS POST REPEAT LOW TRANSVERSE CESAREAN SECTION: ICD-10-CM

## 2020-03-16 LAB
ABO + RH BLD: NORMAL
ALBUMIN SERPL BCP-MCNC: 3.1 G/DL (ref 3.5–5.2)
ALP SERPL-CCNC: 151 U/L (ref 55–135)
ALT SERPL W/O P-5'-P-CCNC: 29 U/L (ref 10–44)
AMPHET+METHAMPHET UR QL: NEGATIVE
ANION GAP SERPL CALC-SCNC: 8 MMOL/L (ref 8–16)
AST SERPL-CCNC: 21 U/L (ref 10–40)
BARBITURATES UR QL SCN>200 NG/ML: NEGATIVE
BASOPHILS # BLD AUTO: 0.01 K/UL (ref 0–0.2)
BASOPHILS NFR BLD: 0.2 % (ref 0–1.9)
BENZODIAZ UR QL SCN>200 NG/ML: NEGATIVE
BILIRUB SERPL-MCNC: 0.3 MG/DL (ref 0.1–1)
BLD GP AB SCN CELLS X3 SERPL QL: NORMAL
BUN SERPL-MCNC: 6 MG/DL (ref 6–20)
BZE UR QL SCN: NEGATIVE
CALCIUM SERPL-MCNC: 8.7 MG/DL (ref 8.7–10.5)
CANNABINOIDS UR QL SCN: NORMAL
CHLORIDE SERPL-SCNC: 106 MMOL/L (ref 95–110)
CO2 SERPL-SCNC: 21 MMOL/L (ref 23–29)
CREAT SERPL-MCNC: 0.7 MG/DL (ref 0.5–1.4)
CREAT UR-MCNC: 170.5 MG/DL (ref 15–325)
CREAT UR-MCNC: 170.5 MG/DL (ref 15–325)
DIFFERENTIAL METHOD: ABNORMAL
EOSINOPHIL # BLD AUTO: 0.1 K/UL (ref 0–0.5)
EOSINOPHIL NFR BLD: 1.2 % (ref 0–8)
ERYTHROCYTE [DISTWIDTH] IN BLOOD BY AUTOMATED COUNT: 13.2 % (ref 11.5–14.5)
EST. GFR  (AFRICAN AMERICAN): >60 ML/MIN/1.73 M^2
EST. GFR  (NON AFRICAN AMERICAN): >60 ML/MIN/1.73 M^2
GLUCOSE SERPL-MCNC: 103 MG/DL (ref 70–110)
HCT VFR BLD AUTO: 30.2 % (ref 37–48.5)
HGB BLD-MCNC: 9.5 G/DL (ref 12–16)
IMM GRANULOCYTES # BLD AUTO: 0.03 K/UL (ref 0–0.04)
IMM GRANULOCYTES NFR BLD AUTO: 0.5 % (ref 0–0.5)
LYMPHOCYTES # BLD AUTO: 1.7 K/UL (ref 1–4.8)
LYMPHOCYTES NFR BLD: 25.3 % (ref 18–48)
MCH RBC QN AUTO: 27.3 PG (ref 27–31)
MCHC RBC AUTO-ENTMCNC: 31.5 G/DL (ref 32–36)
MCV RBC AUTO: 87 FL (ref 82–98)
METHADONE UR QL SCN>300 NG/ML: NEGATIVE
MONOCYTES # BLD AUTO: 0.4 K/UL (ref 0.3–1)
MONOCYTES NFR BLD: 5.5 % (ref 4–15)
NEUTROPHILS # BLD AUTO: 4.4 K/UL (ref 1.8–7.7)
NEUTROPHILS NFR BLD: 67.3 % (ref 38–73)
NRBC BLD-RTO: 0 /100 WBC
OPIATES UR QL SCN: NEGATIVE
PCP UR QL SCN>25 NG/ML: NEGATIVE
PLATELET # BLD AUTO: 375 K/UL (ref 150–350)
PMV BLD AUTO: 9.8 FL (ref 9.2–12.9)
POTASSIUM SERPL-SCNC: 3.6 MMOL/L (ref 3.5–5.1)
PROT SERPL-MCNC: 7.4 G/DL (ref 6–8.4)
PROT UR-MCNC: 54 MG/DL (ref 0–15)
PROT/CREAT UR: 0.32 MG/G{CREAT} (ref 0–0.2)
RBC # BLD AUTO: 3.48 M/UL (ref 4–5.4)
SODIUM SERPL-SCNC: 135 MMOL/L (ref 136–145)
TOXICOLOGY INFORMATION: NORMAL
WBC # BLD AUTO: 6.53 K/UL (ref 3.9–12.7)

## 2020-03-16 PROCEDURE — 25000003 PHARM REV CODE 250: Performed by: ADVANCED PRACTICE MIDWIFE

## 2020-03-16 PROCEDURE — 99211 OFF/OP EST MAY X REQ PHY/QHP: CPT | Mod: 25

## 2020-03-16 PROCEDURE — 85025 COMPLETE CBC W/AUTO DIFF WBC: CPT

## 2020-03-16 PROCEDURE — 86901 BLOOD TYPING SEROLOGIC RH(D): CPT

## 2020-03-16 PROCEDURE — 11000001 HC ACUTE MED/SURG PRIVATE ROOM

## 2020-03-16 PROCEDURE — 59025 FETAL NON-STRESS TEST: CPT

## 2020-03-16 PROCEDURE — 82570 ASSAY OF URINE CREATININE: CPT

## 2020-03-16 PROCEDURE — 80053 COMPREHEN METABOLIC PANEL: CPT

## 2020-03-16 PROCEDURE — 80307 DRUG TEST PRSMV CHEM ANLYZR: CPT

## 2020-03-16 RX ORDER — LABETALOL HYDROCHLORIDE 5 MG/ML
80 INJECTION, SOLUTION INTRAVENOUS ONCE AS NEEDED
Status: DISCONTINUED | OUTPATIENT
Start: 2020-03-17 | End: 2020-03-19

## 2020-03-16 RX ORDER — LABETALOL HYDROCHLORIDE 5 MG/ML
20 INJECTION, SOLUTION INTRAVENOUS ONCE AS NEEDED
Status: DISCONTINUED | OUTPATIENT
Start: 2020-03-17 | End: 2020-03-19

## 2020-03-16 RX ORDER — LABETALOL HYDROCHLORIDE 5 MG/ML
40 INJECTION, SOLUTION INTRAVENOUS ONCE AS NEEDED
Status: DISCONTINUED | OUTPATIENT
Start: 2020-03-17 | End: 2020-03-19

## 2020-03-16 RX ORDER — ACETAMINOPHEN 500 MG
1000 TABLET ORAL EVERY 6 HOURS PRN
Status: DISCONTINUED | OUTPATIENT
Start: 2020-03-16 | End: 2020-03-19

## 2020-03-16 RX ORDER — ACETAMINOPHEN 325 MG/1
650 TABLET ORAL ONCE
Status: DISCONTINUED | OUTPATIENT
Start: 2020-03-17 | End: 2020-03-19

## 2020-03-16 RX ORDER — CALCIUM GLUCONATE 98 MG/ML
1 INJECTION, SOLUTION INTRAVENOUS
Status: DISCONTINUED | OUTPATIENT
Start: 2020-03-16 | End: 2020-03-19

## 2020-03-16 RX ORDER — MAGNESIUM SULFATE HEPTAHYDRATE 40 MG/ML
2 INJECTION, SOLUTION INTRAVENOUS CONTINUOUS
Status: DISCONTINUED | OUTPATIENT
Start: 2020-03-17 | End: 2020-03-19

## 2020-03-16 RX ORDER — BETAMETHASONE SODIUM PHOSPHATE AND BETAMETHASONE ACETATE 3; 3 MG/ML; MG/ML
12 INJECTION, SUSPENSION INTRA-ARTICULAR; INTRALESIONAL; INTRAMUSCULAR; SOFT TISSUE
Status: COMPLETED | OUTPATIENT
Start: 2020-03-17 | End: 2020-03-18

## 2020-03-16 RX ORDER — SODIUM CHLORIDE, SODIUM LACTATE, POTASSIUM CHLORIDE, CALCIUM CHLORIDE 600; 310; 30; 20 MG/100ML; MG/100ML; MG/100ML; MG/100ML
INJECTION, SOLUTION INTRAVENOUS CONTINUOUS
Status: DISCONTINUED | OUTPATIENT
Start: 2020-03-17 | End: 2020-03-19

## 2020-03-16 RX ORDER — HYDRALAZINE HYDROCHLORIDE 20 MG/ML
10 INJECTION INTRAMUSCULAR; INTRAVENOUS ONCE AS NEEDED
Status: DISCONTINUED | OUTPATIENT
Start: 2020-03-17 | End: 2020-03-19

## 2020-03-16 RX ORDER — MAGNESIUM SULFATE HEPTAHYDRATE 40 MG/ML
4 INJECTION, SOLUTION INTRAVENOUS ONCE
Status: COMPLETED | OUTPATIENT
Start: 2020-03-17 | End: 2020-03-17

## 2020-03-16 RX ADMIN — ACETAMINOPHEN 1000 MG: 500 TABLET ORAL at 10:03

## 2020-03-17 PROBLEM — O36.8190 DECREASED FETAL MOVEMENT: Status: RESOLVED | Noted: 2020-03-09 | Resolved: 2020-03-17

## 2020-03-17 PROBLEM — O14.90 PRE-ECLAMPSIA AFFECTING PREGNANCY, ANTEPARTUM: Status: ACTIVE | Noted: 2020-02-17

## 2020-03-17 PROCEDURE — 96372 THER/PROPH/DIAG INJ SC/IM: CPT | Mod: 59

## 2020-03-17 PROCEDURE — 96361 HYDRATE IV INFUSION ADD-ON: CPT

## 2020-03-17 PROCEDURE — 96365 THER/PROPH/DIAG IV INF INIT: CPT

## 2020-03-17 PROCEDURE — 11000001 HC ACUTE MED/SURG PRIVATE ROOM

## 2020-03-17 PROCEDURE — 72100003 HC LABOR CARE, EA. ADDL. 8 HRS

## 2020-03-17 PROCEDURE — 96366 THER/PROPH/DIAG IV INF ADDON: CPT

## 2020-03-17 PROCEDURE — 72100002 HC LABOR CARE, 1ST 8 HOURS

## 2020-03-17 PROCEDURE — 63600175 PHARM REV CODE 636 W HCPCS: Performed by: ADVANCED PRACTICE MIDWIFE

## 2020-03-17 PROCEDURE — 25000003 PHARM REV CODE 250: Performed by: MIDWIFE

## 2020-03-17 PROCEDURE — 25000003 PHARM REV CODE 250: Performed by: ADVANCED PRACTICE MIDWIFE

## 2020-03-17 PROCEDURE — 96372 THER/PROPH/DIAG INJ SC/IM: CPT

## 2020-03-17 RX ORDER — DOCUSATE SODIUM 100 MG/1
100 CAPSULE, LIQUID FILLED ORAL 2 TIMES DAILY
Status: DISCONTINUED | OUTPATIENT
Start: 2020-03-17 | End: 2020-03-19

## 2020-03-17 RX ORDER — ONDANSETRON 8 MG/1
8 TABLET, ORALLY DISINTEGRATING ORAL ONCE
Status: COMPLETED | OUTPATIENT
Start: 2020-03-17 | End: 2020-03-17

## 2020-03-17 RX ORDER — FERROUS SULFATE 325(65) MG
325 TABLET, DELAYED RELEASE (ENTERIC COATED) ORAL 2 TIMES DAILY
Status: DISCONTINUED | OUTPATIENT
Start: 2020-03-17 | End: 2020-03-19

## 2020-03-17 RX ORDER — VALACYCLOVIR HYDROCHLORIDE 500 MG/1
500 TABLET, FILM COATED ORAL 2 TIMES DAILY
Status: DISCONTINUED | OUTPATIENT
Start: 2020-03-17 | End: 2020-03-19

## 2020-03-17 RX ADMIN — ACETAMINOPHEN 1000 MG: 500 TABLET ORAL at 04:03

## 2020-03-17 RX ADMIN — DOCUSATE SODIUM 100 MG: 100 CAPSULE ORAL at 09:03

## 2020-03-17 RX ADMIN — VALACYCLOVIR HYDROCHLORIDE 500 MG: 500 TABLET, FILM COATED ORAL at 08:03

## 2020-03-17 RX ADMIN — MAGNESIUM SULFATE HEPTAHYDRATE 4 G: 40 INJECTION, SOLUTION INTRAVENOUS at 12:03

## 2020-03-17 RX ADMIN — SODIUM CHLORIDE, SODIUM LACTATE, POTASSIUM CHLORIDE, AND CALCIUM CHLORIDE: .6; .31; .03; .02 INJECTION, SOLUTION INTRAVENOUS at 12:03

## 2020-03-17 RX ADMIN — MAGNESIUM SULFATE IN WATER 2 G/HR: 40 INJECTION, SOLUTION INTRAVENOUS at 06:03

## 2020-03-17 RX ADMIN — DOCUSATE SODIUM 100 MG: 100 CAPSULE ORAL at 08:03

## 2020-03-17 RX ADMIN — ONDANSETRON 8 MG: 8 TABLET, ORALLY DISINTEGRATING ORAL at 09:03

## 2020-03-17 RX ADMIN — BETAMETHASONE SODIUM PHOSPHATE AND BETAMETHASONE ACETATE 12 MG: 3; 3 INJECTION, SUSPENSION INTRA-ARTICULAR; INTRALESIONAL; INTRAMUSCULAR at 01:03

## 2020-03-17 NOTE — SUBJECTIVE & OBJECTIVE
Obstetric HPI:  Patient reports None contractions, active fetal movement, absent vaginal bleeding , absent loss of fluid ; reports minimal headache, denies upper abdominal pains; emotionally well; hungry     Objective:     Vital Signs (Most Recent):  Temp: 98.4 °F (36.9 °C) (03/17/20 0700)  Pulse: 96 (03/17/20 0700)  Resp: 18 (03/16/20 2311)  BP: (!) 145/68 (03/17/20 0700)  SpO2: 95 % (03/17/20 0700) Vital Signs (24h Range):  Temp:  [98.4 °F (36.9 °C)-98.7 °F (37.1 °C)] 98.4 °F (36.9 °C)  Pulse:  [] 96  Resp:  [18] 18  SpO2:  [95 %-100 %] 95 %  BP: (111-170)/() 145/68        There is no height or weight on file to calculate BMI.    FHT: 120-130Cat 1 (reassuring)  TOCO: none    Intake/Output Summary (Last 24 hours) at 3/17/2020 0801  Last data filed at 3/17/2020 0700  Gross per 24 hour   Intake 777.08 ml   Output 800 ml   Net -22.92 ml     Cervix deferred    Significant Labs:  Recent Lab Results       03/16/20 2232        Benzodiazepines Negative     Methadone metabolites Negative     Phencyclidine Negative     Albumin 3.1     Alkaline Phosphatase 151     ALT 29     Amphetamine Screen, Ur Negative     Anion Gap 8     AST 21     Barbiturate Screen, Ur Negative     Baso # 0.01     Basophil% 0.2     BILIRUBIN TOTAL 0.3  Comment:  For infants and newborns, interpretation of results should be based  on gestational age, weight and in agreement with clinical  observations.  Premature Infant recommended reference ranges:  Up to 24 hours.............<8.0 mg/dL  Up to 48 hours............<12.0 mg/dL  3-5 days..................<15.0 mg/dL  6-29 days.................<15.0 mg/dL       BUN, Bld 6     Calcium 8.7     Chloride 106     CO2 21     Cocaine (Metab.) Negative     Creatinine 0.7     Creatinine, Random Ur 170.5  Comment:  The random urine reference ranges provided were established   for 24 hour urine collections.  No reference ranges exist for  random urine specimens.  Correlate clinically.         170.5  Comment:  The random urine reference ranges provided were established   for 24 hour urine collections.  No reference ranges exist for  random urine specimens.  Correlate clinically.       Differential Method Automated     eGFR if  >60     eGFR if non  >60  Comment:  Calculation used to obtain the estimated glomerular filtration  rate (eGFR) is the CKD-EPI equation.        Eos # 0.1     Eosinophil% 1.2     Glucose 103     Gran # (ANC) 4.4     Gran% 67.3     Group & Rh O POS     Hematocrit 30.2     Hemoglobin 9.5     Immature Grans (Abs) 0.03  Comment:  Mild elevation in immature granulocytes is non specific and   can be seen in a variety of conditions including stress response,   acute inflammation, trauma and pregnancy. Correlation with other   laboratory and clinical findings is essential.       Immature Granulocytes 0.5     INDIRECT KISHA NEG     Lymph # 1.7     Lymph% 25.3     MCH 27.3     MCHC 31.5     MCV 87     Mono # 0.4     Mono% 5.5     MPV 9.8     nRBC 0     Opiate Scrn, Ur Negative     Platelets 375     Potassium 3.6     Prot/Creat Ratio, Ur 0.32     PROTEIN TOTAL 7.4     Protein, Urine Random 54  Comment:  The random urine reference ranges provided were established   for 24 hour urine collections.  No reference ranges exist for  random urine specimens.  Correlate clinically.       RBC 3.48     RDW 13.2     Sodium 135     Marijuana (THC) Metabolite Presumptive Positive     Toxicology Information SEE COMMENT  Comment:  This screen includes the following classes of drugs at the   listed cut-off:  Benzodiazepines                  200 ng/ml  Methadone                        300 ng/ml  Cocaine metabolite               300 ng/ml  Opiates                          300 ng/ml  Barbiturates                     200 ng/ml  Amphetamines                    1000 ng/ml  Marijuana metabs (THC)            50 ng/ml  Phencyclidine (PCP)               25 ng/ml  High concentrations of  Diphenhydramine may cross-react with  Phencyclidine PCP screening immunoassay giving a false   positive result.  High concentrations of Methylenedioxymethamphetamine (MDMA aka  Ectasy) and other structurally similar compounds may cross-   react with the Amphetamine/Methamphetamine screening   immunoassay giving a false positive result.  A metabolite of the anti-HIV drug Sustiva () may cause  false positive results in the Marijuana metabolite (THC)   screening assay.  Note: This exception list includes only more common   interferants in toxicology screen testing.  Because of many   cross-reactantspositive results on toxicology drug screens   should be confirmed whenever results do not correlate with   clinical presentation.  This report is intended for use in clinical monitoring and  management of patients. It is not intended for use in   employment related drug testing.  Because of any cross-reactants, positive results on toxicology  drug screens should be confirmed whenever results do not  correlate with clinical presentation.  Presumptive positive results are unconfirmed and may be used   only for medical purposes.  Assay Intended Use: This assay provides only a preliminary analytical  test result. A more specific alternate chemical method must be used  to obtain a confirmed analytical result. Gas chromatography/mass  spectrometry (GS/MS)is the preferred confirmatory method. Clinical  consideration and professional judgement should be applied to any   drug of abuse test result, particularly when preliminary results  are used.       WBC 6.53           Physical Exam:   Constitutional: She is oriented to person, place, and time. She appears well-developed and well-nourished.    HENT:   Head: Normocephalic.    Eyes: Pupils are equal, round, and reactive to light. Conjunctivae are normal.    Neck: Normal range of motion.    Cardiovascular: Normal rate and regular rhythm.     Pulmonary/Chest: Effort normal.         Abdominal: Soft. Bowel sounds are normal. There is no tenderness. There is no rebound and no guarding.     Genitourinary:   Genitourinary Comments: Gravid, non tender           Musculoskeletal: Normal range of motion and moves all extremeties. She exhibits no edema.       Neurological: She is alert and oriented to person, place, and time. She has normal reflexes.    Skin: Skin is warm and dry.    Psychiatric: She has a normal mood and affect. Her behavior is normal. Judgment and thought content normal.

## 2020-03-17 NOTE — ASSESSMENT & PLAN NOTE
Admits to not taking baby ASA daily   PIH labs on admit with PCR 0.32  B/ps in high range with brisk reflexes, will begin magnesium sulfate and OB hypertension protocol  Continue to monitor maternal and fetal status  MD to manage in the AM

## 2020-03-17 NOTE — PROGRESS NOTES
[facility] -   Obstetrics  Antepartum Progress Note    Patient Name: Juliet Jalloh  MRN: [id number]  Admission Date: 3/16/2020  Hospital Length of Stay: 1 days  Attending Physician: Gale Engle MD  Primary Care Provider: Primary Doctor No    Subjective:     Principal Problem:Pre-eclampsia affecting pregnancy, antepartum    HPI:  C/o headache, denies blurry vision or epigastric pain     Hospital Course:  Observation   IV Magnesium sulfate therapy  OB HTN protocol  3/17/20-pcr .32, magnesium sulfate infusing, 2nd celestone due 3/18 early am; anticipate rpt c/section on 3/19 (24 hr after 2nd steroid dose if bp allows)    Obstetric HPI:  Patient reports None contractions, active fetal movement, absent vaginal bleeding , absent loss of fluid ; reports minimal headache, denies upper abdominal pains; emotionally well; hungry     Objective:     Vital Signs (Most Recent):  Temp: 98.4 °F (36.9 °C) (03/17/20 0700)  Pulse: 96 (03/17/20 0700)  Resp: 18 (03/16/20 2311)  BP: (!) 145/68 (03/17/20 0700)  SpO2: 95 % (03/17/20 0700) Vital Signs (24h Range):  Temp:  [98.4 °F (36.9 °C)-98.7 °F (37.1 °C)] 98.4 °F (36.9 °C)  Pulse:  [] 96  Resp:  [18] 18  SpO2:  [95 %-100 %] 95 %  BP: (111-170)/() 145/68        There is no height or weight on file to calculate BMI.    FHT: 120-130Cat 1 (reassuring)  TOCO: none    Intake/Output Summary (Last 24 hours) at 3/17/2020 0801  Last data filed at 3/17/2020 0700  Gross per 24 hour   Intake 777.08 ml   Output 800 ml   Net -22.92 ml     Cervix deferred    Significant Labs:  Recent Lab Results       03/16/20 2232        Benzodiazepines Negative     Methadone metabolites Negative     Phencyclidine Negative     Albumin 3.1     Alkaline Phosphatase 151     ALT 29     Amphetamine Screen, Ur Negative     Anion Gap 8     AST 21     Barbiturate Screen, Ur Negative     Baso # 0.01     Basophil% 0.2     BILIRUBIN TOTAL 0.3  Comment:  For infants and newborns,  interpretation of results should be based  on gestational age, weight and in agreement with clinical  observations.  Premature Infant recommended reference ranges:  Up to 24 hours.............<8.0 mg/dL  Up to 48 hours............<12.0 mg/dL  3-5 days..................<15.0 mg/dL  6-29 days.................<15.0 mg/dL       BUN, Bld 6     Calcium 8.7     Chloride 106     CO2 21     Cocaine (Metab.) Negative     Creatinine 0.7     Creatinine, Random Ur 170.5  Comment:  The random urine reference ranges provided were established   for 24 hour urine collections.  No reference ranges exist for  random urine specimens.  Correlate clinically.        170.5  Comment:  The random urine reference ranges provided were established   for 24 hour urine collections.  No reference ranges exist for  random urine specimens.  Correlate clinically.       Differential Method Automated     eGFR if  >60     eGFR if non  >60  Comment:  Calculation used to obtain the estimated glomerular filtration  rate (eGFR) is the CKD-EPI equation.        Eos # 0.1     Eosinophil% 1.2     Glucose 103     Gran # (ANC) 4.4     Gran% 67.3     Group & Rh O POS     Hematocrit 30.2     Hemoglobin 9.5     Immature Grans (Abs) 0.03  Comment:  Mild elevation in immature granulocytes is non specific and   can be seen in a variety of conditions including stress response,   acute inflammation, trauma and pregnancy. Correlation with other   laboratory and clinical findings is essential.       Immature Granulocytes 0.5     INDIRECT KISHA NEG     Lymph # 1.7     Lymph% 25.3     MCH 27.3     MCHC 31.5     MCV 87     Mono # 0.4     Mono% 5.5     MPV 9.8     nRBC 0     Opiate Scrn, Ur Negative     Platelets 375     Potassium 3.6     Prot/Creat Ratio, Ur 0.32     PROTEIN TOTAL 7.4     Protein, Urine Random 54  Comment:  The random urine reference ranges provided were established   for 24 hour urine collections.  No reference ranges exist  for  random urine specimens.  Correlate clinically.       RBC 3.48     RDW 13.2     Sodium 135     Marijuana (THC) Metabolite Presumptive Positive     Toxicology Information SEE COMMENT  Comment:  This screen includes the following classes of drugs at the   listed cut-off:  Benzodiazepines                  200 ng/ml  Methadone                        300 ng/ml  Cocaine metabolite               300 ng/ml  Opiates                          300 ng/ml  Barbiturates                     200 ng/ml  Amphetamines                    1000 ng/ml  Marijuana metabs (THC)            50 ng/ml  Phencyclidine (PCP)               25 ng/ml  High concentrations of Diphenhydramine may cross-react with  Phencyclidine PCP screening immunoassay giving a false   positive result.  High concentrations of Methylenedioxymethamphetamine (MDMA aka  Ectasy) and other structurally similar compounds may cross-   react with the Amphetamine/Methamphetamine screening   immunoassay giving a false positive result.  A metabolite of the anti-HIV drug Sustiva () may cause  false positive results in the Marijuana metabolite (THC)   screening assay.  Note: This exception list includes only more common   interferants in toxicology screen testing.  Because of many   cross-reactantspositive results on toxicology drug screens   should be confirmed whenever results do not correlate with   clinical presentation.  This report is intended for use in clinical monitoring and  management of patients. It is not intended for use in   employment related drug testing.  Because of any cross-reactants, positive results on toxicology  drug screens should be confirmed whenever results do not  correlate with clinical presentation.  Presumptive positive results are unconfirmed and may be used   only for medical purposes.  Assay Intended Use: This assay provides only a preliminary analytical  test result. A more specific alternate chemical method must be used  to obtain a  confirmed analytical result. Gas chromatography/mass  spectrometry (GS/MS)is the preferred confirmatory method. Clinical  consideration and professional judgement should be applied to any   drug of abuse test result, particularly when preliminary results  are used.       WBC 6.53           Physical Exam:   Constitutional: She is oriented to person, place, and time. She appears well-developed and well-nourished.    HENT:   Head: Normocephalic.    Eyes: Pupils are equal, round, and reactive to light. Conjunctivae are normal.    Neck: Normal range of motion.    Cardiovascular: Normal rate and regular rhythm.     Pulmonary/Chest: Effort normal.        Abdominal: Soft. Bowel sounds are normal. There is no tenderness. There is no rebound and no guarding.     Genitourinary:   Genitourinary Comments: Gravid, non tender           Musculoskeletal: Normal range of motion and moves all extremeties. She exhibits no edema.       Neurological: She is alert and oriented to person, place, and time. She has normal reflexes.    Skin: Skin is warm and dry.    Psychiatric: She has a normal mood and affect. Her behavior is normal. Judgment and thought content normal.       Assessment/Plan:     24 y.o. female  at 34w3d for:    * Pre-eclampsia affecting pregnancy, antepartum  Admits to not taking baby ASA daily   PIH labs on admit with PCR 0.32  B/ps in high range with brisk reflexes, will begin magnesium sulfate and OB hypertension protocol  Continue to monitor maternal and fetal status  Will receive 2nd steroid dose 3/18 am, anticipate rpt c/section on 3/19 unless bp unresponsive to hypertension protocol    Previous  delivery affecting pregnancy, antepartum  Last pregnancy due to pre-eclampsia, desires repeat    Nausea and vomiting in pregnancy  Zofran and phenergan at home     Constipation  Due to zofran and iron   Colace while in hospital    Depression with suicidal ideation  PEC'd in January of this year   On medication  and reports doing well with it    Anemia complicating pregnancy  Stopped iron due to constipation, will re-start in hospital with colace    Herpes simplex virus (HSV) infection  Prophylaxis begun 3/17/2020          Poonam Galdamez MD  Obstetrics  [facility]

## 2020-03-17 NOTE — SUBJECTIVE & OBJECTIVE
Obstetric HPI:  Patient reports None contractions, active fetal movement, No vaginal bleeding , No loss of fluid     This pregnancy has been complicated by   Depression, PEC this pregnancy, on medication   Hx PIH with last baby   Previous c/s due to pre-eclampsia, does not take ASA as prescribed  Constipation, from zofran and iron (no longer taking)  Anemia, stopped iron due to constipation   HSV, will begin prophylaxis     OB History    Para Term  AB Living   2 1 1 0 0 1   SAB TAB Ectopic Multiple Live Births   0 0 0 0 1      # Outcome Date GA Lbr Goldy/2nd Weight Sex Delivery Anes PTL Lv   2 Current            1 Term 17 37w1d  2.835 kg (6 lb 4 oz) M CS-LTranv Gen N BETTINA      Complications: Failure to Progress in First Stage      Name: VANESA POP      Apgar1: 7  Apgar5: 9     Past Medical History:   Diagnosis Date    Abnormal Pap smear of cervix     Acute viral syndrome 2020    Herpes simplex virus (HSV) infection     Pre-eclampsia in third trimester 2017     Past Surgical History:   Procedure Laterality Date     SECTION      MOUTH SURGERY         PTA Medications   Medication Sig    aspirin 81 MG Chew Take 1 tablet (81 mg total) by mouth once daily.    docusate sodium (COLACE) 100 MG capsule Take 1 capsule (100 mg total) by mouth 2 (two) times daily.    ferrous sulfate 325 (65 FE) MG EC tablet Take 1 tablet (325 mg total) by mouth 2 (two) times daily.    ondansetron (ZOFRAN) 4 MG tablet     prenatal vits62/FA/om3/dha/epa (PRENATAL GUMMY ORAL) Take by mouth.    promethazine (PHENERGAN) 25 MG suppository Place 1 suppository (25 mg total) rectally every 6 (six) hours as needed for Nausea.       Review of patient's allergies indicates:  No Known Allergies     Family History     Problem Relation (Age of Onset)    Colon cancer Maternal Grandfather    Diabetes Maternal Grandmother    Heart disease Maternal Grandmother    Hypertension Maternal Grandmother        Tobacco  Use    Smoking status: Never Smoker    Smokeless tobacco: Never Used   Substance and Sexual Activity    Alcohol use: Not Currently     Frequency: Monthly or less     Drinks per session: 1 or 2     Binge frequency: Never     Comment: social use     Drug use: Yes     Types: Marijuana     Comment: Last used in August 2019    Sexual activity: Yes     Partners: Male     Birth control/protection: None     Review of Systems   Gastrointestinal: Positive for constipation. Negative for abdominal pain.   Genitourinary: Negative for vaginal bleeding and vaginal discharge.   Neurological: Positive for headaches. Negative for syncope.   Psychiatric/Behavioral: Negative for depression.   All other systems reviewed and are negative.     Objective:     Vital Signs (Most Recent):  Pulse: 83 (03/16/20 2322)  BP: (!) 134/100 (03/16/20 2322)  SpO2: 100 % (03/16/20 2321) Vital Signs (24h Range):  Pulse:  [] 83  SpO2:  [98 %-100 %] 100 %  BP: (130-152)/() 134/100        There is no height or weight on file to calculate BMI.    FHT: 140 bpm, Cat 1 (reassuring)  TOCO: None    Physical Exam:   Constitutional: She is oriented to person, place, and time. She appears well-developed and well-nourished.    HENT:   Head: Normocephalic.     Neck: Normal range of motion.    Cardiovascular: Normal rate.     Pulmonary/Chest: Effort normal.        Abdominal: Soft.   Gravid, non-tender             Musculoskeletal: Normal range of motion.       Neurological: She is alert and oriented to person, place, and time.   Reflex Scores:       Brachioradialis reflexes are 3+ on the right side.       Patellar reflexes are 2+ on the right side.  No Clonus    Skin: Skin is warm and dry.    Psychiatric: She has a normal mood and affect. Her behavior is normal. Judgment and thought content normal.       Cervix:  Deferred     Significant Labs:  Lab Results   Component Value Date    GROUPTRH O POS 03/16/2020    HEPBSAG Negative 12/08/2019    STREPBCULT  STREPTOCOCCUS AGALACTIAE (GROUP B) 07/18/2017       I have personallly reviewed all pertinent lab results from the last 24 hours.

## 2020-03-17 NOTE — ASSESSMENT & PLAN NOTE
ESSIE'd in January of this year   Emotionally stable off  medication  (reports previous use of buproprion)

## 2020-03-17 NOTE — HOSPITAL COURSE
Admission for severe preeclampsia.  IV Magnesium sulfate therapy  OB HTN protocol  3/17/20-pcr .32, magnesium sulfate infusing, 2nd celestone due 3/18 early am; anticipate rpt c/section on 3/19 (24 hr after 2nd steroid dose if bp allows)  3/18/20-s/p 2nd dose of magnesium sulfate earlier this am; bp stable off magnesium sulfate  3/19/20: Patient underwent a repeat LTCS for severe preeclampsia.  She delivered a viable female infant.   cc.  Normal post op course.

## 2020-03-17 NOTE — ASSESSMENT & PLAN NOTE
Admits to not taking baby ASA daily   PIH labs on admit with PCR 0.32  B/ps in high range with brisk reflexes, will begin magnesium sulfate and OB hypertension protocol  Continue to monitor maternal and fetal status  Will receive 2nd steroid dose 3/18 am, anticipate rpt c/section on 3/19 unless bp unresponsive to hypertension protocol

## 2020-03-17 NOTE — H&P
[facility] -   Obstetrics  History & Physical    Patient Name: Juliet Pop  MRN: [id number]  Admission Date: 3/16/2020  Primary Care Provider: Primary Doctor No    Subjective:     Principal Problem:Pre-eclampsia affecting pregnancy, antepartum    History of Present Illness:  C/o headache, denies blurry vision or epigastric pain     Obstetric HPI:  Patient reports None contractions, active fetal movement, No vaginal bleeding , No loss of fluid     This pregnancy has been complicated by   Depression, PEC this pregnancy, on medication   Hx PIH with last baby   Previous c/s due to pre-eclampsia, does not take ASA as prescribed  Constipation, from zofran and iron (no longer taking)  Anemia, stopped iron due to constipation   HSV, will begin prophylaxis     OB History    Para Term  AB Living   2 1 1 0 0 1   SAB TAB Ectopic Multiple Live Births   0 0 0 0 1      # Outcome Date GA Lbr Goldy/2nd Weight Sex Delivery Anes PTL Lv   2 Current            1 Term 17 37w1d  2.835 kg (6 lb 4 oz) M CS-LTranv Gen N BETTINA      Complications: Failure to Progress in First Stage      Name: VANESA POP      Apgar1: 7  Apgar5: 9     Past Medical History:   Diagnosis Date    Abnormal Pap smear of cervix     Acute viral syndrome 2020    Herpes simplex virus (HSV) infection     Pre-eclampsia in third trimester 2017     Past Surgical History:   Procedure Laterality Date     SECTION      MOUTH SURGERY         PTA Medications   Medication Sig    aspirin 81 MG Chew Take 1 tablet (81 mg total) by mouth once daily.    docusate sodium (COLACE) 100 MG capsule Take 1 capsule (100 mg total) by mouth 2 (two) times daily.    ferrous sulfate 325 (65 FE) MG EC tablet Take 1 tablet (325 mg total) by mouth 2 (two) times daily.    ondansetron (ZOFRAN) 4 MG tablet     prenatal vits62/FA/om3/dha/epa (PRENATAL GUMMY ORAL) Take by mouth.    promethazine (PHENERGAN) 25 MG suppository Place 1  suppository (25 mg total) rectally every 6 (six) hours as needed for Nausea.       Review of patient's allergies indicates:  No Known Allergies     Family History     Problem Relation (Age of Onset)    Colon cancer Maternal Grandfather    Diabetes Maternal Grandmother    Heart disease Maternal Grandmother    Hypertension Maternal Grandmother        Tobacco Use    Smoking status: Never Smoker    Smokeless tobacco: Never Used   Substance and Sexual Activity    Alcohol use: Not Currently     Frequency: Monthly or less     Drinks per session: 1 or 2     Binge frequency: Never     Comment: social use     Drug use: Yes     Types: Marijuana     Comment: Last used in August 2019    Sexual activity: Yes     Partners: Male     Birth control/protection: None     Review of Systems   Gastrointestinal: Positive for constipation. Negative for abdominal pain.   Genitourinary: Negative for vaginal bleeding and vaginal discharge.   Neurological: Positive for headaches. Negative for syncope.   Psychiatric/Behavioral: Negative for depression.   All other systems reviewed and are negative.     Objective:     Vital Signs (Most Recent):  Pulse: 83 (03/16/20 2322)  BP: (!) 134/100 (03/16/20 2322)  SpO2: 100 % (03/16/20 2321) Vital Signs (24h Range):  Pulse:  [] 83  SpO2:  [98 %-100 %] 100 %  BP: (130-152)/() 134/100        There is no height or weight on file to calculate BMI.    FHT: 140 bpm, Cat 1 (reassuring)  TOCO: None    Physical Exam:   Constitutional: She is oriented to person, place, and time. She appears well-developed and well-nourished.    HENT:   Head: Normocephalic.     Neck: Normal range of motion.    Cardiovascular: Normal rate.     Pulmonary/Chest: Effort normal.        Abdominal: Soft.   Gravid, non-tender             Musculoskeletal: Normal range of motion.       Neurological: She is alert and oriented to person, place, and time.   Reflex Scores:       Brachioradialis reflexes are 3+ on the right side.        Patellar reflexes are 2+ on the right side.  No Clonus    Skin: Skin is warm and dry.    Psychiatric: She has a normal mood and affect. Her behavior is normal. Judgment and thought content normal.       Cervix:  Deferred     Significant Labs:  Lab Results   Component Value Date    GROUPTRH O POS 2020    HEPBSAG Negative 2019    STREPBCULT STREPTOCOCCUS AGALACTIAE (GROUP B) 2017       I have personallly reviewed all pertinent lab results from the last 24 hours.    Assessment/Plan:     24 y.o. female  at 34w3d for:    * Pre-eclampsia affecting pregnancy, antepartum  Admits to not taking baby ASA daily   PIH labs on admit with PCR 0.32  B/ps in high range with brisk reflexes, will begin magnesium sulfate and OB hypertension protocol  Continue to monitor maternal and fetal status  MD to manage in the AM    Previous  delivery affecting pregnancy, antepartum  Last pregnancy due to pre-eclampsia, desires repeat    Nausea and vomiting in pregnancy  Zofran and phenergan at home     Constipation  Due to zofran and iron   Colace while in hospital    Depression with suicidal ideation  PEC'd in January of this year   On medication and reports doing well with it    Anemia complicating pregnancy  Stopped iron due to constipation, will re-start in hospital with colace    Herpes simplex virus (HSV) infection  Prophylaxis begun 3/17/2020        Jason Enriquez CNM  Obstetrics  [facility] [place]

## 2020-03-18 PROCEDURE — 25000003 PHARM REV CODE 250: Performed by: MIDWIFE

## 2020-03-18 PROCEDURE — 25000003 PHARM REV CODE 250: Performed by: ADVANCED PRACTICE MIDWIFE

## 2020-03-18 PROCEDURE — 63600175 PHARM REV CODE 636 W HCPCS: Performed by: ADVANCED PRACTICE MIDWIFE

## 2020-03-18 PROCEDURE — 11000001 HC ACUTE MED/SURG PRIVATE ROOM

## 2020-03-18 PROCEDURE — 72100003 HC LABOR CARE, EA. ADDL. 8 HRS

## 2020-03-18 RX ORDER — MAG HYDROX/ALUMINUM HYD/SIMETH 200-200-20
30 SUSPENSION, ORAL (FINAL DOSE FORM) ORAL
Status: DISCONTINUED | OUTPATIENT
Start: 2020-03-18 | End: 2020-03-19

## 2020-03-18 RX ORDER — ONDANSETRON 8 MG/1
8 TABLET, ORALLY DISINTEGRATING ORAL EVERY 8 HOURS PRN
Status: DISCONTINUED | OUTPATIENT
Start: 2020-03-18 | End: 2020-03-19

## 2020-03-18 RX ADMIN — ALUMINUM HYDROXIDE, MAGNESIUM HYDROXIDE, AND SIMETHICONE 30 ML: 200; 200; 20 SUSPENSION ORAL at 07:03

## 2020-03-18 RX ADMIN — ONDANSETRON 8 MG: 8 TABLET, ORALLY DISINTEGRATING ORAL at 02:03

## 2020-03-18 RX ADMIN — DOCUSATE SODIUM 100 MG: 100 CAPSULE ORAL at 02:03

## 2020-03-18 RX ADMIN — PROMETHAZINE HYDROCHLORIDE 12.5 MG: 25 INJECTION INTRAMUSCULAR; INTRAVENOUS at 07:03

## 2020-03-18 RX ADMIN — DOCUSATE SODIUM 100 MG: 100 CAPSULE ORAL at 08:03

## 2020-03-18 RX ADMIN — VALACYCLOVIR HYDROCHLORIDE 500 MG: 500 TABLET, FILM COATED ORAL at 02:03

## 2020-03-18 RX ADMIN — VALACYCLOVIR HYDROCHLORIDE 500 MG: 500 TABLET, FILM COATED ORAL at 08:03

## 2020-03-18 RX ADMIN — ONDANSETRON 8 MG: 8 TABLET, ORALLY DISINTEGRATING ORAL at 05:03

## 2020-03-18 RX ADMIN — BETAMETHASONE SODIUM PHOSPHATE AND BETAMETHASONE ACETATE 12 MG: 3; 3 INJECTION, SUSPENSION INTRA-ARTICULAR; INTRALESIONAL; INTRAMUSCULAR at 01:03

## 2020-03-18 NOTE — CONSULTS
Neonatology Consultation 2020    Patient Name:  ELVIRA POP  Account #:  [id number]  MRN:  [id number]  Gender:  Female  YOB: 1995 12:00 AM    Date/Time of Consult:  2020 10:37 AM  Referring Physician:  Poonam Galdamez MD  Chief Complaint:  consult    MATERNAL HISTORY  Prenatal Care:  Yes  EDC:  2020 Ultrasound  Age:  25    /Parity:   2 Parity 1 Term 1 Premature 0  0 Living   Children 1   Obstetrician:  Gale Engle MD    Pregnancy Complications:   Depression - treated, Expectant parent(s) prebirth   pediatrician visit, Genital herpes - inactive, Marijuana use, Preeclampsia    Pregnancy Complication Comments:    Admission urine presumptive positive for marijuana.     Pregnancy Medications:     - aspirin    Labor Characteristics:  Labor Type:  not present  Chorioamnionitis:  no  Maternal Hypertension - Chronic:  no  Maternal Hypertension - Pregnancy Induced:  yes    Labor Complications:   preeclampsia    Labor Medications:  -magnesium sulfate  -cephalexin  -betamethasone acet,sod phos  -Prenatal Vitamin  -  -labetalol  -Iron (ferrous sulfate)  -Valtrex    LABS     Group and RH  O positive; RPR  nonreactive; HBsAg  negative; Rubella Immune   Status  immune; HIV 1/2 Ab  negative; Perianal cult. for beta Strep.  not done;   Group and RH  O positive    DIAGNOSES  1. Expectant parent(s) prebirth pediatrician visit (Z76.81)  Onset: 3/18/2020  Comments:  Consent obtained from mother for  therapy.  Overall  mortality   and morbidity for this gestation discussed with mother. We talked about delivery   room course, stabilization, NICU course - respiratory support, feeding related   issues, thermoregulation, jaundice and phototherapy and criteria for discharge.   Mother has a good understanding and did not have any further questions at the   end of our discussion.     Attending:JOSE: Rosalinda Adorno MD 3/18/2020 11:33 AM

## 2020-03-18 NOTE — PLAN OF CARE
Shift Summary: patient b/p controlled overnight. Second dose of steroids administered. Patient ambulated without any falls or injury after magnesium d/c'd. Plans for c/s after steroid complete.

## 2020-03-18 NOTE — PROGRESS NOTES
[facility] -   Obstetrics  Antepartum Progress Note    Patient Name: Juliet Jalloh  MRN: [id number]  Admission Date: 3/16/2020  Hospital Length of Stay: 1 days  Attending Physician: Gale Engle MD  Primary Care Provider: Primary Doctor No    Subjective:     Principal Problem:Pre-eclampsia affecting pregnancy, antepartum    HPI:  C/o headache, denies blurry vision or epigastric pain     Hospital Course:  Observation   IV Magnesium sulfate therapy  OB HTN protocol  3/17/20-pcr .32, magnesium sulfate infusing, 2nd celestone due 3/18 early am; anticipate rpt c/section on 3/19 (24 hr after 2nd steroid dose if bp allows)  3/18/20-s/p 2nd dose of magnesium sulfate earlier this am; bp stable off magnesium sulfate    Obstetric HPI:  Patient reports None contractions, active fetal movement, absent vaginal bleeding , absent loss of fluid, denies headache, blurry vision, seeing spots     Objective:     Vital Signs (Most Recent):  Temp: 97.8 °F (36.6 °C) (03/18/20 0644)  Pulse: 83 (03/18/20 0644)  Resp: 18 (03/18/20 0644)  BP: 123/64 (03/18/20 0644)  SpO2: 97 % (03/18/20 0200) Vital Signs (24h Range):  Temp:  [97.8 °F (36.6 °C)-98.5 °F (36.9 °C)] 97.8 °F (36.6 °C)  Pulse:  [] 83  Resp:  [18-20] 18  SpO2:  [97 %-100 %] 97 %  BP: (106-143)/(47-80) 123/64     Weight: 117.9 kg (260 lb)  Body mass index is 41.97 kg/m².    FHT: 120-130's Cat 1 (reassuring)  TOCO: none      Intake/Output Summary (Last 24 hours) at 3/18/2020 1006  Last data filed at 3/18/2020 0000  Gross per 24 hour   Intake 1675 ml   Output 2450 ml   Net -775 ml     Cervix deferred    Significant Labs:  Recent Lab Results     None          Physical Exam:   Constitutional: She is oriented to person, place, and time. She appears well-developed and well-nourished.    HENT:   Head: Normocephalic.    Eyes: Pupils are equal, round, and reactive to light. Conjunctivae are normal.    Neck: Normal range of motion.    Cardiovascular: Normal rate  and regular rhythm.     Pulmonary/Chest: Effort normal.        Abdominal: Soft.     Genitourinary:   Genitourinary Comments: Gravid, non tender           Musculoskeletal: Normal range of motion and moves all extremeties. She exhibits no edema.       Neurological: She is alert and oriented to person, place, and time. She has normal reflexes. She displays normal reflexes. She exhibits normal muscle tone.    Skin: Skin is warm and dry.    Psychiatric: She has a normal mood and affect. Her behavior is normal. Judgment and thought content normal.       Assessment/Plan:     24 y.o. female  at 34w4d for:    * Pre-eclampsia affecting pregnancy, antepartum  Admits to not taking baby ASA daily   PIH labs on admit with PCR 0.32  B/ps in high range with brisk reflexes, will begin magnesium sulfate and OB hypertension protocol  Continue to monitor maternal and fetal status  Received 2nd steroid dose 3/18 am, magnesium discontinued, plan  rpt c/section on 3/19 (24 hr after 2nd dose of celestone) unless bp unresponsive to hypertension protocol  Neonatology consult placed    Previous  delivery affecting pregnancy, antepartum  Last pregnancy due to pre-eclampsia, desires repeat  Pt aware npo after 2200 tonight    Nausea and vomiting in pregnancy  Zofran and phenergan at home     Constipation  Due to zofran and iron   Colace while in hospital    Depression with suicidal ideation  PEC'd in January of this year   Emotionally stable off  medication  (reports previous use of buproprion)    Anemia complicating pregnancy  Stopped iron due to constipation, will re-start in hospital with colace    Herpes simplex virus (HSV) infection  Prophylaxis begun 3/17/2020          Poonam Galdamez MD  Obstetrics  [facility]

## 2020-03-18 NOTE — ASSESSMENT & PLAN NOTE
Admits to not taking baby ASA daily   PIH labs on admit with PCR 0.32  B/ps in high range with brisk reflexes, will begin magnesium sulfate and OB hypertension protocol  Continue to monitor maternal and fetal status  Received 2nd steroid dose 3/18 am, magnesium discontinued, plan  rpt c/section on 3/19 (24 hr after 2nd dose of celestone) unless bp unresponsive to hypertension protocol  Neonatology consult placed

## 2020-03-18 NOTE — PHYSICIAN QUERY
PT Name: Juliet Jalloh  MR #: [id number]     Physician Query Form - Documentation Clarification      CDS/: ASHLY John,[id number]        Contact information:[email or web address]    This form is a permanent document in the medical record.     Query Date: March 18, 2020    By submitting this query, we are merely seeking further clarification of documentation. Please utilize your independent clinical judgment when addressing the question(s) below.    The Medical record reflects the following:    Supporting Clinical Findings Location in Medical Record   This pregnancy has been complicated by   HSV, will begin prophylaxis     Genitourinary: Negative for vaginal bleeding and vaginal discharge    valACYclovir tablet 500 mg    H&P 3/17              MAR 3/17                                                                                      Doctor, Please specify diagnosis or diagnoses associated with above clinical findings.    Please specify type of HSV.    Provider Use Only      [ X ] Genital, unspecified   [  ] Oral  [  ] Vulva  [  ] Vagina  [  ] Labia  [  ] Cervix  [  ] Anus  [  ] Other, please specify:_______________________________                                                                                                                 [  ] Clinically Undetermined

## 2020-03-19 ENCOUNTER — ANESTHESIA (OUTPATIENT)
Dept: OBSTETRICS AND GYNECOLOGY | Facility: HOSPITAL | Age: 25
End: 2020-03-19
Payer: MEDICAID

## 2020-03-19 ENCOUNTER — ANESTHESIA EVENT (OUTPATIENT)
Dept: OBSTETRICS AND GYNECOLOGY | Facility: HOSPITAL | Age: 25
End: 2020-03-19
Payer: MEDICAID

## 2020-03-19 PROBLEM — Z98.891 STATUS POST REPEAT LOW TRANSVERSE CESAREAN SECTION: Status: ACTIVE | Noted: 2020-02-17

## 2020-03-19 PROBLEM — O21.9 NAUSEA AND VOMITING IN PREGNANCY: Status: RESOLVED | Noted: 2020-01-26 | Resolved: 2020-03-19

## 2020-03-19 PROBLEM — R45.851 DEPRESSION WITH SUICIDAL IDEATION: Status: RESOLVED | Noted: 2020-01-07 | Resolved: 2020-03-19

## 2020-03-19 PROBLEM — F32.A DEPRESSION WITH SUICIDAL IDEATION: Status: RESOLVED | Noted: 2020-01-07 | Resolved: 2020-03-19

## 2020-03-19 LAB
ABO + RH BLD: NORMAL
BASOPHILS # BLD AUTO: 0.02 K/UL (ref 0–0.2)
BASOPHILS NFR BLD: 0.3 % (ref 0–1.9)
BLD GP AB SCN CELLS X3 SERPL QL: NORMAL
DIFFERENTIAL METHOD: ABNORMAL
EOSINOPHIL # BLD AUTO: 0.1 K/UL (ref 0–0.5)
EOSINOPHIL NFR BLD: 0.8 % (ref 0–8)
ERYTHROCYTE [DISTWIDTH] IN BLOOD BY AUTOMATED COUNT: 13.2 % (ref 11.5–14.5)
HCT VFR BLD AUTO: 27.7 % (ref 37–48.5)
HGB BLD-MCNC: 8.8 G/DL (ref 12–16)
IMM GRANULOCYTES # BLD AUTO: 0.03 K/UL (ref 0–0.04)
IMM GRANULOCYTES NFR BLD AUTO: 0.4 % (ref 0–0.5)
LYMPHOCYTES # BLD AUTO: 2.4 K/UL (ref 1–4.8)
LYMPHOCYTES NFR BLD: 31.5 % (ref 18–48)
MCH RBC QN AUTO: 27.2 PG (ref 27–31)
MCHC RBC AUTO-ENTMCNC: 31.8 G/DL (ref 32–36)
MCV RBC AUTO: 86 FL (ref 82–98)
MONOCYTES # BLD AUTO: 0.6 K/UL (ref 0.3–1)
MONOCYTES NFR BLD: 7.6 % (ref 4–15)
NEUTROPHILS # BLD AUTO: 4.5 K/UL (ref 1.8–7.7)
NEUTROPHILS NFR BLD: 59.4 % (ref 38–73)
NRBC BLD-RTO: 0 /100 WBC
PLATELET # BLD AUTO: 345 K/UL (ref 150–350)
PMV BLD AUTO: 10 FL (ref 9.2–12.9)
RBC # BLD AUTO: 3.23 M/UL (ref 4–5.4)
WBC # BLD AUTO: 7.64 K/UL (ref 3.9–12.7)

## 2020-03-19 PROCEDURE — 59514 CESAREAN DELIVERY ONLY: CPT | Mod: AT,,, | Performed by: OBSTETRICS & GYNECOLOGY

## 2020-03-19 PROCEDURE — 99900059 HC C-SECTION ATTEND (STAT)

## 2020-03-19 PROCEDURE — 25000003 PHARM REV CODE 250: Performed by: OBSTETRICS & GYNECOLOGY

## 2020-03-19 PROCEDURE — 85025 COMPLETE CBC W/AUTO DIFF WBC: CPT

## 2020-03-19 PROCEDURE — S0020 INJECTION, BUPIVICAINE HYDRO: HCPCS | Performed by: NURSE ANESTHETIST, CERTIFIED REGISTERED

## 2020-03-19 PROCEDURE — 63600175 PHARM REV CODE 636 W HCPCS: Performed by: OBSTETRICS & GYNECOLOGY

## 2020-03-19 PROCEDURE — 25000003 PHARM REV CODE 250: Performed by: NURSE ANESTHETIST, CERTIFIED REGISTERED

## 2020-03-19 PROCEDURE — 72100003 HC LABOR CARE, EA. ADDL. 8 HRS

## 2020-03-19 PROCEDURE — 63600175 PHARM REV CODE 636 W HCPCS: Performed by: NURSE ANESTHETIST, CERTIFIED REGISTERED

## 2020-03-19 PROCEDURE — 59514 CESAREAN DELIVERY ONLY: CPT | Mod: AS,AT,, | Performed by: PHYSICIAN ASSISTANT

## 2020-03-19 PROCEDURE — 37000009 HC ANESTHESIA EA ADD 15 MINS: Performed by: OBSTETRICS & GYNECOLOGY

## 2020-03-19 PROCEDURE — 59514 PR CESAREAN DELIVERY ONLY: ICD-10-PCS | Mod: AT,,, | Performed by: OBSTETRICS & GYNECOLOGY

## 2020-03-19 PROCEDURE — 63600175 PHARM REV CODE 636 W HCPCS: Performed by: PHYSICIAN ASSISTANT

## 2020-03-19 PROCEDURE — 36000685 HC CESAREAN SECTION LEVEL I

## 2020-03-19 PROCEDURE — 59514 PR CESAREAN DELIVERY ONLY: ICD-10-PCS | Mod: AS,AT,, | Performed by: PHYSICIAN ASSISTANT

## 2020-03-19 PROCEDURE — 51702 INSERT TEMP BLADDER CATH: CPT

## 2020-03-19 PROCEDURE — 11000001 HC ACUTE MED/SURG PRIVATE ROOM

## 2020-03-19 PROCEDURE — 86901 BLOOD TYPING SEROLOGIC RH(D): CPT

## 2020-03-19 PROCEDURE — 37000008 HC ANESTHESIA 1ST 15 MINUTES: Performed by: OBSTETRICS & GYNECOLOGY

## 2020-03-19 RX ORDER — KETOROLAC TROMETHAMINE 30 MG/ML
30 INJECTION, SOLUTION INTRAMUSCULAR; INTRAVENOUS EVERY 6 HOURS
Status: COMPLETED | OUTPATIENT
Start: 2020-03-19 | End: 2020-03-20

## 2020-03-19 RX ORDER — FENTANYL CITRATE 50 UG/ML
INJECTION, SOLUTION INTRAMUSCULAR; INTRAVENOUS
Status: DISCONTINUED | OUTPATIENT
Start: 2020-03-19 | End: 2020-03-19

## 2020-03-19 RX ORDER — DIPHENHYDRAMINE HYDROCHLORIDE 50 MG/ML
25 INJECTION INTRAMUSCULAR; INTRAVENOUS EVERY 4 HOURS PRN
Status: DISCONTINUED | OUTPATIENT
Start: 2020-03-19 | End: 2020-03-23 | Stop reason: HOSPADM

## 2020-03-19 RX ORDER — CHLORHEXIDINE GLUCONATE ORAL RINSE 1.2 MG/ML
10 SOLUTION DENTAL
Status: DISCONTINUED | OUTPATIENT
Start: 2020-03-19 | End: 2020-03-19

## 2020-03-19 RX ORDER — IBUPROFEN 800 MG/1
800 TABLET ORAL EVERY 8 HOURS
Status: DISCONTINUED | OUTPATIENT
Start: 2020-03-20 | End: 2020-03-23 | Stop reason: HOSPADM

## 2020-03-19 RX ORDER — OXYTOCIN/RINGER'S LACTATE 30/500 ML
41.65 PLASTIC BAG, INJECTION (ML) INTRAVENOUS CONTINUOUS
Status: DISPENSED | OUTPATIENT
Start: 2020-03-19 | End: 2020-03-19

## 2020-03-19 RX ORDER — MISOPROSTOL 200 UG/1
800 TABLET ORAL
Status: DISCONTINUED | OUTPATIENT
Start: 2020-03-19 | End: 2020-03-19

## 2020-03-19 RX ORDER — DIPHENHYDRAMINE HYDROCHLORIDE 50 MG/ML
12.5 INJECTION INTRAMUSCULAR; INTRAVENOUS
Status: DISCONTINUED | OUTPATIENT
Start: 2020-03-19 | End: 2020-03-23 | Stop reason: HOSPADM

## 2020-03-19 RX ORDER — SODIUM CITRATE AND CITRIC ACID MONOHYDRATE 334; 500 MG/5ML; MG/5ML
30 SOLUTION ORAL
Status: DISCONTINUED | OUTPATIENT
Start: 2020-03-19 | End: 2020-03-19

## 2020-03-19 RX ORDER — OXYTOCIN/RINGER'S LACTATE 30/500 ML
333 PLASTIC BAG, INJECTION (ML) INTRAVENOUS CONTINUOUS
Status: DISCONTINUED | OUTPATIENT
Start: 2020-03-19 | End: 2020-03-19

## 2020-03-19 RX ORDER — HYDROCODONE BITARTRATE AND ACETAMINOPHEN 5; 325 MG/1; MG/1
1 TABLET ORAL EVERY 4 HOURS PRN
Status: DISCONTINUED | OUTPATIENT
Start: 2020-03-19 | End: 2020-03-23 | Stop reason: HOSPADM

## 2020-03-19 RX ORDER — SODIUM CHLORIDE, SODIUM LACTATE, POTASSIUM CHLORIDE, CALCIUM CHLORIDE 600; 310; 30; 20 MG/100ML; MG/100ML; MG/100ML; MG/100ML
INJECTION, SOLUTION INTRAVENOUS CONTINUOUS
Status: DISCONTINUED | OUTPATIENT
Start: 2020-03-19 | End: 2020-03-19

## 2020-03-19 RX ORDER — DIPHENHYDRAMINE HCL 25 MG
25 CAPSULE ORAL EVERY 4 HOURS PRN
Status: DISCONTINUED | OUTPATIENT
Start: 2020-03-19 | End: 2020-03-23 | Stop reason: HOSPADM

## 2020-03-19 RX ORDER — MORPHINE SULFATE 1 MG/ML
INJECTION, SOLUTION EPIDURAL; INTRATHECAL; INTRAVENOUS
Status: DISCONTINUED | OUTPATIENT
Start: 2020-03-19 | End: 2020-03-19

## 2020-03-19 RX ORDER — PRENATAL WITH FERROUS FUM AND FOLIC ACID 3080; 920; 120; 400; 22; 1.84; 3; 20; 10; 1; 12; 200; 27; 25; 2 [IU]/1; [IU]/1; MG/1; [IU]/1; MG/1; MG/1; MG/1; MG/1; MG/1; MG/1; UG/1; MG/1; MG/1; MG/1; MG/1
1 TABLET ORAL DAILY
Status: DISCONTINUED | OUTPATIENT
Start: 2020-03-19 | End: 2020-03-23 | Stop reason: HOSPADM

## 2020-03-19 RX ORDER — SODIUM CHLORIDE, SODIUM LACTATE, POTASSIUM CHLORIDE, CALCIUM CHLORIDE 600; 310; 30; 20 MG/100ML; MG/100ML; MG/100ML; MG/100ML
INJECTION, SOLUTION INTRAVENOUS CONTINUOUS PRN
Status: DISCONTINUED | OUTPATIENT
Start: 2020-03-19 | End: 2020-03-19

## 2020-03-19 RX ORDER — OXYTOCIN/RINGER'S LACTATE 30/500 ML
41.7 PLASTIC BAG, INJECTION (ML) INTRAVENOUS CONTINUOUS
Status: DISCONTINUED | OUTPATIENT
Start: 2020-03-19 | End: 2020-03-19

## 2020-03-19 RX ORDER — ONDANSETRON 2 MG/ML
INJECTION INTRAMUSCULAR; INTRAVENOUS
Status: DISCONTINUED | OUTPATIENT
Start: 2020-03-19 | End: 2020-03-19

## 2020-03-19 RX ORDER — SODIUM CHLORIDE, SODIUM LACTATE, POTASSIUM CHLORIDE, CALCIUM CHLORIDE 600; 310; 30; 20 MG/100ML; MG/100ML; MG/100ML; MG/100ML
INJECTION, SOLUTION INTRAVENOUS CONTINUOUS
Status: DISCONTINUED | OUTPATIENT
Start: 2020-03-19 | End: 2020-03-20

## 2020-03-19 RX ORDER — BUPIVACAINE HYDROCHLORIDE 7.5 MG/ML
INJECTION, SOLUTION EPIDURAL; RETROBULBAR
Status: DISCONTINUED | OUTPATIENT
Start: 2020-03-19 | End: 2020-03-19

## 2020-03-19 RX ORDER — ONDANSETRON 8 MG/1
8 TABLET, ORALLY DISINTEGRATING ORAL EVERY 8 HOURS PRN
Status: DISCONTINUED | OUTPATIENT
Start: 2020-03-19 | End: 2020-03-23 | Stop reason: HOSPADM

## 2020-03-19 RX ORDER — ENOXAPARIN SODIUM 100 MG/ML
40 INJECTION SUBCUTANEOUS EVERY 24 HOURS
Status: DISCONTINUED | OUTPATIENT
Start: 2020-03-20 | End: 2020-03-23 | Stop reason: HOSPADM

## 2020-03-19 RX ORDER — PHENYLEPHRINE HYDROCHLORIDE 10 MG/ML
INJECTION INTRAVENOUS
Status: DISCONTINUED | OUTPATIENT
Start: 2020-03-19 | End: 2020-03-19

## 2020-03-19 RX ORDER — ONDANSETRON 2 MG/ML
4 INJECTION INTRAMUSCULAR; INTRAVENOUS EVERY 6 HOURS PRN
Status: DISCONTINUED | OUTPATIENT
Start: 2020-03-19 | End: 2020-03-23 | Stop reason: HOSPADM

## 2020-03-19 RX ORDER — HYDROCODONE BITARTRATE AND ACETAMINOPHEN 10; 325 MG/1; MG/1
1 TABLET ORAL EVERY 4 HOURS PRN
Status: DISCONTINUED | OUTPATIENT
Start: 2020-03-19 | End: 2020-03-23 | Stop reason: HOSPADM

## 2020-03-19 RX ORDER — CEFAZOLIN SODIUM 2 G/50ML
2 SOLUTION INTRAVENOUS
Status: DISCONTINUED | OUTPATIENT
Start: 2020-03-19 | End: 2020-03-19

## 2020-03-19 RX ORDER — AMMONIA 15 % (W/V)
0.3 AMPUL (EA) INHALATION CONTINUOUS PRN
Status: DISCONTINUED | OUTPATIENT
Start: 2020-03-19 | End: 2020-03-23 | Stop reason: HOSPADM

## 2020-03-19 RX ORDER — DOCUSATE SODIUM 100 MG/1
100 CAPSULE, LIQUID FILLED ORAL DAILY
Status: DISCONTINUED | OUTPATIENT
Start: 2020-03-19 | End: 2020-03-20

## 2020-03-19 RX ORDER — DIPHENHYDRAMINE HYDROCHLORIDE 50 MG/ML
INJECTION INTRAMUSCULAR; INTRAVENOUS
Status: DISCONTINUED | OUTPATIENT
Start: 2020-03-19 | End: 2020-03-19

## 2020-03-19 RX ORDER — ACETAMINOPHEN 325 MG/1
650 TABLET ORAL EVERY 6 HOURS PRN
Status: DISCONTINUED | OUTPATIENT
Start: 2020-03-19 | End: 2020-03-23 | Stop reason: HOSPADM

## 2020-03-19 RX ORDER — KETOROLAC TROMETHAMINE 30 MG/ML
INJECTION, SOLUTION INTRAMUSCULAR; INTRAVENOUS
Status: DISCONTINUED | OUTPATIENT
Start: 2020-03-19 | End: 2020-03-19

## 2020-03-19 RX ORDER — HYDROMORPHONE HYDROCHLORIDE 1 MG/ML
1 INJECTION, SOLUTION INTRAMUSCULAR; INTRAVENOUS; SUBCUTANEOUS EVERY 4 HOURS PRN
Status: DISCONTINUED | OUTPATIENT
Start: 2020-03-19 | End: 2020-03-23 | Stop reason: HOSPADM

## 2020-03-19 RX ADMIN — SODIUM CHLORIDE, SODIUM LACTATE, POTASSIUM CHLORIDE, AND CALCIUM CHLORIDE 1000 ML: .6; .31; .03; .02 INJECTION, SOLUTION INTRAVENOUS at 05:03

## 2020-03-19 RX ADMIN — FENTANYL CITRATE 10 MCG: 50 INJECTION, SOLUTION INTRAMUSCULAR; INTRAVENOUS at 07:03

## 2020-03-19 RX ADMIN — KETOROLAC TROMETHAMINE 30 MG: 30 INJECTION, SOLUTION INTRAMUSCULAR; INTRAVENOUS at 01:03

## 2020-03-19 RX ADMIN — BUPIVACAINE HYDROCHLORIDE 1.8 ML: 7.5 INJECTION, SOLUTION EPIDURAL; RETROBULBAR at 07:03

## 2020-03-19 RX ADMIN — Medication 41.65 MILLI-UNITS/MIN: at 10:03

## 2020-03-19 RX ADMIN — DIPHENHYDRAMINE HYDROCHLORIDE 25 MG: 50 INJECTION INTRAMUSCULAR; INTRAVENOUS at 08:03

## 2020-03-19 RX ADMIN — MORPHINE SULFATE 0.2 MG: 1 INJECTION, SOLUTION EPIDURAL; INTRATHECAL; INTRAVENOUS at 07:03

## 2020-03-19 RX ADMIN — KETOROLAC TROMETHAMINE 30 MG: 30 INJECTION, SOLUTION INTRAMUSCULAR; INTRAVENOUS at 06:03

## 2020-03-19 RX ADMIN — HYDROCODONE BITARTRATE AND ACETAMINOPHEN 1 TABLET: 5; 325 TABLET ORAL at 04:03

## 2020-03-19 RX ADMIN — HYDROCODONE BITARTRATE AND ACETAMINOPHEN 1 TABLET: 10; 325 TABLET ORAL at 08:03

## 2020-03-19 RX ADMIN — SODIUM CHLORIDE, SODIUM LACTATE, POTASSIUM CHLORIDE, AND CALCIUM CHLORIDE: .6; .31; .03; .02 INJECTION, SOLUTION INTRAVENOUS at 07:03

## 2020-03-19 RX ADMIN — DIPHENHYDRAMINE HYDROCHLORIDE 25 MG: 25 CAPSULE ORAL at 12:03

## 2020-03-19 RX ADMIN — CEFAZOLIN 2 G: 1 INJECTION, POWDER, FOR SOLUTION INTRAMUSCULAR; INTRAVENOUS at 07:03

## 2020-03-19 RX ADMIN — PHENYLEPHRINE HYDROCHLORIDE 100 MCG: 10 INJECTION INTRAVENOUS at 07:03

## 2020-03-19 RX ADMIN — KETOROLAC TROMETHAMINE 30 MG: 30 INJECTION, SOLUTION INTRAMUSCULAR; INTRAVENOUS at 11:03

## 2020-03-19 RX ADMIN — KETOROLAC TROMETHAMINE 30 MG: 30 INJECTION, SOLUTION INTRAMUSCULAR at 08:03

## 2020-03-19 RX ADMIN — ONDANSETRON 4 MG: 2 INJECTION, SOLUTION INTRAMUSCULAR; INTRAVENOUS at 07:03

## 2020-03-19 RX ADMIN — Medication 334 ML/HR: at 07:03

## 2020-03-19 NOTE — SUBJECTIVE & OBJECTIVE
Obstetric HPI:  Patient reports None contractions, active fetal movement, absent vaginal bleeding , absent loss of fluid.  She has been having intermittent headaches and labile bp since admission.      Objective:     Vital Signs (Most Recent):  Temp: 98.1 °F (36.7 °C) (03/18/20 1911)  Pulse: 81 (03/19/20 0415)  Resp: 18 (03/18/20 1911)  BP: (!) 103/47 (03/19/20 0403)  SpO2: 99 % (03/19/20 0415) Vital Signs (24h Range):  Temp:  [98.1 °F (36.7 °C)] 98.1 °F (36.7 °C)  Pulse:  [] 81  Resp:  [18] 18  SpO2:  [82 %-100 %] 99 %  BP: ()/() 103/47     Weight: 117.9 kg (260 lb)  Body mass index is 41.97 kg/m².      No intake or output data in the 24 hours ending 03/19/20 0718         Significant Labs:  Recent Lab Results       03/19/20  0615        Baso # 0.02     Basophil% 0.3     Differential Method Automated     Eos # 0.1     Eosinophil% 0.8     Gran # (ANC) 4.5     Gran% 59.4     Hematocrit 27.7     Hemoglobin 8.8     Immature Grans (Abs) 0.03  Comment:  Mild elevation in immature granulocytes is non specific and   can be seen in a variety of conditions including stress response,   acute inflammation, trauma and pregnancy. Correlation with other   laboratory and clinical findings is essential.       Immature Granulocytes 0.4     Lymph # 2.4     Lymph% 31.5     MCH 27.2     MCHC 31.8     MCV 86     Mono # 0.6     Mono% 7.6     MPV 10.0     nRBC 0     Platelets 345     RBC 3.23     RDW 13.2     WBC 7.64           Physical Exam:   Constitutional: She is oriented to person, place, and time. She appears well-developed and well-nourished. No distress.             Abdominal: Soft. She exhibits no distension and no mass. There is no tenderness. There is no rebound and no guarding.                 Neurological: She is alert and oriented to person, place, and time.     Psychiatric: She has a normal mood and affect. Her behavior is normal. Judgment and thought content normal.

## 2020-03-19 NOTE — LACTATION NOTE
Due to baby's admit to NICU, hand expression performed and reviewed with patient. Reviewed proper handling and storage of expressed breastmilk. Explained importance of early and frequent milk removal, that pumping will be initiated as soon as possible, and to continue performing hand expression in addition to pumping. Ensure milk removal is attempted 8 or more times per 24-hour period. Patient verbalizes understanding of instructions.     [employer] breast pump set up at bedside.  Instructed on proper usage and to adjust suction according to comfort level. Verified appropriate flange fit- 27mm. Reviewed frequency and duration of pumping in order to promote and maintain full milk supply. Hands-on pumping technique reviewed. Encouraged hand expression after. Instructed on proper cleaning of breast pump parts. Reviewed proper milk handling, collection, storage, and transportation. Voices understanding.    2mL collected and provided to NICU.

## 2020-03-19 NOTE — PROGRESS NOTES
Pt resting. BPs labile. Plan for repeat csection in AM. NST cat 1    Vitals:    03/18/20 2038 03/18/20 2043 03/18/20 2048 03/18/20 2053   BP:       Pulse: (!) 25 89 84 83   Resp:       Temp:       TempSrc:       SpO2: (!) 82% 99% 99% 99%   Weight:        03/18/20 2058 03/18/20 2105 03/18/20 2110 03/18/20 2115   BP:       Pulse: 80 80 81 85   Resp:       Temp:       TempSrc:       SpO2: 98% 99% 99% 99%   Weight:        03/18/20 2120 03/18/20 2125 03/18/20 2130 03/18/20 2135   BP:       Pulse: 86 86 81 81   Resp:       Temp:       TempSrc:       SpO2: 99% 99% 99% 98%   Weight:        03/18/20 2140 03/18/20 2145 03/18/20 2150   BP:      Pulse: 82 86 85   Resp:      Temp:      TempSrc:      SpO2: 99% 99% 99%   Weight:

## 2020-03-19 NOTE — OP NOTE
"[facility]   Section   Operative Note    SUMMARY     Date of Procedure: 3/19/2020     Procedure: Procedure(s) (LRB):   SECTION (N/A)    Surgeon(s) and Role:     * Shira Mcrae MD - Primary    Assistant:  Татьяна Coleman PA-C, assistance necessary for completion of the surgery    Pre-Operative Diagnosis: Pre-eclampsia affecting pregnancy, antepartum [O14.90]  Previous  delivery affecting pregnancy, antepartum [O34.219]    Post-Operative Diagnosis: Post-Op Diagnosis Codes:     * Pre-eclampsia affecting pregnancy, antepartum [O14.90]     * Previous  delivery affecting pregnancy, antepartum [O34.219]    Anesthesia: Spinal/Epidural    Technical Procedures Used: Repeat low transverse  delivery via Pfannensteil skin incision           Description of the Findings of the Procedure:   Normal uterus, tubes, and ovaries.  Clear amniotic fluid.  Female infant ("[place]"), cephalic, 5lb9oz.  3 vessel cord.  Placenta delivered spontaneously, intact.    Significant Surgical Tasks Conducted by the Assistant(s), if Applicable: retraction, exposure, skin closure    Complications: No    QBL:  430 cc     With patient in supine position, the legs are  and Mcbride Catheter placed and positioning to supine done.   Abdomen prepped with Chloroprep and 3 minute drying time allowed prior to draping of the abdomen.   Time out taken with OR team members.  Pfannenstiel Incision made through the skin, transverse fascial incision developed,  rectus muscles  in the midline and the peritoneum entered.   no adhesions noted.  The lower uterine segment and position of the fetus identified.   Bladder flap taken down through transverse peritoneal incision.    Low Transverse Incision made through well developer lower uterine segment and extended laterally with blunt dissection.   Clear fluid noted.  Infant delivered from vertex presentation.  Cord clamped after one minute and  " handed to attending nurse.  Cord blood taken, placenta delivered.  The uterus wasnot exteriorized.  The edges of the uterine incision are grasped with Ahuja clamps at the angles and the inferior and superior midline edges of the incision.    Closure with running lock 0 Chromic, starting at each angle, tying in the midline.   Observation for bleeding with suture of any bleeding along the hysterotomy line.   With good hemostasis noted, the anterior pelvis is rinsed with sterile saline.   Right and left adnexa with normal anatomy.     Closure of the abdomen with 2 0 Vicryl horizontal mattress of the rectus muscles, fascial closure with 0 looped PDS starting at the left angle and tying the knot at the right angle.  Skin closure with 4 0 Monocryl subcuticular.  Wound dressed with Aquasel and pressure dressing.          Specimens:   Specimen (12h ago, onward)    None          Condition: Good    Disposition: PACU - hemodynamically stable.    Attestation: Good

## 2020-03-19 NOTE — ANESTHESIA PREPROCEDURE EVALUATION
03/19/2020  Juliet Jalloh is a 24 y.o., female.    Anesthesia Evaluation    I have reviewed the Patient Summary Reports.    I have reviewed the Nursing Notes.   I have reviewed the Medications.     Review of Systems  Anesthesia Hx:  No previous Anesthesia    Social:  Marijuana    Hematology/Oncology:  Hematology Normal   Oncology Normal     EENT/Dental:EENT/Dental Normal   Cardiovascular:   Exercise tolerance: good Hypertension NYHA Classification I    Pulmonary:  Pulmonary Normal    Renal/:  Renal/ Normal     Hepatic/GI:  Hepatic/GI Normal    Musculoskeletal:  Musculoskeletal Normal    Neurological:  Neurology Normal    Endocrine:  Endocrine Normal    Dermatological:  Skin Normal    Psych:   Psychiatric History          Physical Exam  General:  Obesity    Airway/Jaw/Neck:  Airway Findings: Mouth Opening: Normal Tongue: Normal  General Airway Assessment: Adult  Mallampati: II  Improves to I with phonation.  TM Distance: Normal, at least 6 cm        Eyes/Ears/Nose:  EYES/EARS/NOSE FINDINGS: Normal   Dental:  DENTAL FINDINGS: Normal   Chest/Lungs:  Chest/Lungs Findings: Clear to auscultation, Normal Respiratory Rate     Heart/Vascular:  Heart Findings: Rate: Normal  Rhythm: Regular Rhythm     Abdomen:  Abdomen Findings: Normal    Musculoskeletal:  Musculoskeletal Findings: Normal   Skin:  Skin Findings: Normal    Mental Status:  Mental Status Findings:  Cooperative, Alert and Oriented         Anesthesia Plan  Type of Anesthesia, risks & benefits discussed:  Anesthesia Type:  general  Patient's Preference:   Intra-op Monitoring Plan: standard ASA monitors  Intra-op Monitoring Plan Comments:   Post Op Pain Control Plan: multimodal analgesia  Post Op Pain Control Plan Comments:   Induction:   IV  Beta Blocker:  Patient is not currently on a Beta-Blocker (No further documentation required).        Informed Consent: Patient understands risks and agrees with Anesthesia plan.  Questions answered. Anesthesia consent signed with patient.  ASA Score: 2     Day of Surgery Review of History & Physical:    H&P update referred to the surgeon.         Ready For Surgery From Anesthesia Perspective.

## 2020-03-19 NOTE — PROGRESS NOTES
03/18/20 2024   TeleJennifer Naidu Note - Communication   [place] Nurse Communicated with Bedside Nurse Regarding: Fetal Status   TeleStmagdalena Naidu Note - Notification   Nurse Notified? Yes   Name of Nurse Angie      NO fetal tracing see. RN stated pt up eating and they will adjust EC.

## 2020-03-19 NOTE — ASSESSMENT & PLAN NOTE
Admits to not taking baby ASA daily   PIH labs on admit with PCR 0.32  B/ps in high range with brisk reflexes, will begin magnesium sulfate and OB hypertension protocol  Continue to monitor maternal and fetal status  Received 2nd steroid dose 3/18 am, magnesium discontinued, plan  rpt c/section on 3/19 (24 hr after 2nd dose of celestone) unless bp unresponsive to hypertension protocol  Neonatology consult placed  3/19/20:  Patient with severe preeclampsia based on intermittent HA, labile bp, and proteinuria.  Is s/p BMZ and magnesium sulfate.  Proceed with delivery as planned.

## 2020-03-19 NOTE — ASSESSMENT & PLAN NOTE
Last pregnancy due to pre-eclampsia, desires repeat  3/19/20:  Patient desires repeat C/S.  Consents were reviewed in detail and signed.  All questions answered.  Proceed with repeat LTCS.

## 2020-03-19 NOTE — PROGRESS NOTES
[facility] -   Obstetrics  Antepartum Progress Note    Patient Name: Juliet Jalloh  MRN: [id number]  Admission Date: 3/16/2020  Hospital Length of Stay: 2 days  Attending Physician: Gale Engle MD  Primary Care Provider: Primary Doctor No    Subjective:     Principal Problem:Pre-eclampsia affecting pregnancy, antepartum    HPI:  C/o headache, denies blurry vision or epigastric pain     Hospital Course:  Observation   IV Magnesium sulfate therapy  OB HTN protocol  3/17/20-pcr .32, magnesium sulfate infusing, 2nd celestone due 3/18 early am; anticipate rpt c/section on 3/19 (24 hr after 2nd steroid dose if bp allows)  3/18/20-s/p 2nd dose of magnesium sulfate earlier this am; bp stable off magnesium sulfate    Obstetric HPI:  Patient reports None contractions, active fetal movement, absent vaginal bleeding , absent loss of fluid.  She has been having intermittent headaches and labile bp since admission.      Objective:     Vital Signs (Most Recent):  Temp: 98.1 °F (36.7 °C) (03/18/20 1911)  Pulse: 81 (03/19/20 0415)  Resp: 18 (03/18/20 1911)  BP: (!) 103/47 (03/19/20 0403)  SpO2: 99 % (03/19/20 0415) Vital Signs (24h Range):  Temp:  [98.1 °F (36.7 °C)] 98.1 °F (36.7 °C)  Pulse:  [] 81  Resp:  [18] 18  SpO2:  [82 %-100 %] 99 %  BP: ()/() 103/47     Weight: 117.9 kg (260 lb)  Body mass index is 41.97 kg/m².      No intake or output data in the 24 hours ending 03/19/20 0718         Significant Labs:  Recent Lab Results       03/19/20  0615        Baso # 0.02     Basophil% 0.3     Differential Method Automated     Eos # 0.1     Eosinophil% 0.8     Gran # (ANC) 4.5     Gran% 59.4     Hematocrit 27.7     Hemoglobin 8.8     Immature Grans (Abs) 0.03  Comment:  Mild elevation in immature granulocytes is non specific and   can be seen in a variety of conditions including stress response,   acute inflammation, trauma and pregnancy. Correlation with other   laboratory and clinical  findings is essential.       Immature Granulocytes 0.4     Lymph # 2.4     Lymph% 31.5     MCH 27.2     MCHC 31.8     MCV 86     Mono # 0.6     Mono% 7.6     MPV 10.0     nRBC 0     Platelets 345     RBC 3.23     RDW 13.2     WBC 7.64           Physical Exam:   Constitutional: She is oriented to person, place, and time. She appears well-developed and well-nourished. No distress.             Abdominal: Soft. She exhibits no distension and no mass. There is no tenderness. There is no rebound and no guarding.                 Neurological: She is alert and oriented to person, place, and time.     Psychiatric: She has a normal mood and affect. Her behavior is normal. Judgment and thought content normal.       Assessment/Plan:     24 y.o. female  at 34w5d for:    * Pre-eclampsia affecting pregnancy, antepartum  Admits to not taking baby ASA daily   PIH labs on admit with PCR 0.32  B/ps in high range with brisk reflexes, will begin magnesium sulfate and OB hypertension protocol  Continue to monitor maternal and fetal status  Received 2nd steroid dose 3/18 am, magnesium discontinued, plan  rpt c/section on 3/19 (24 hr after 2nd dose of celestone) unless bp unresponsive to hypertension protocol  Neonatology consult placed  3/19/20:  Patient with severe preeclampsia based on intermittent HA, labile bp, and proteinuria.  Is s/p BMZ and magnesium sulfate.  Proceed with delivery as planned.    Previous  delivery affecting pregnancy, antepartum  Last pregnancy due to pre-eclampsia, desires repeat  3/19/20:  Patient desires repeat C/S.  Consents were reviewed in detail and signed.  All questions answered.  Proceed with repeat LTCS.    Nausea and vomiting in pregnancy  Zofran and phenergan at home     Constipation  Due to zofran and iron   Colace while in hospital    Depression with suicidal ideation  PEC'd in January of this year   Emotionally stable off  medication  (reports previous use of buproprion)    Anemia  complicating pregnancy  Stopped iron due to constipation, will re-start in hospital with colace    Herpes simplex virus (HSV) infection  Prophylaxis begun 3/17/2020          Shira Mcrae MD  Obstetrics  [facility]

## 2020-03-19 NOTE — TRANSFER OF CARE
Anesthesia Transfer of Care Note    Patient: Juliet Jalloh    Procedure(s) Performed: Procedure(s) (LRB):   SECTION (N/A)    Patient location: Labor and Delivery    Anesthesia Type: spinal    Transport from OR: Transported from OR on room air with adequate spontaneous ventilation    Post pain: adequate analgesia    Post assessment: no apparent anesthetic complications    Post vital signs: stable    Level of consciousness: awake    Nausea/Vomiting: no nausea/vomiting    Complications: none    Transfer of care protocol was followed      Last vitals:   Visit Vitals  BP (!) 103/47   Pulse 81   Temp 36.7 °C (98.1 °F) (Oral)   Resp 18   Wt 117.9 kg (260 lb)   LMP 2019 (Exact Date)   SpO2 99%   Breastfeeding? No   BMI 41.97 kg/m²

## 2020-03-19 NOTE — ANESTHESIA PROCEDURE NOTES
Spinal    Diagnosis: iup, repeat csection  Patient location during procedure: OR  Start time: 3/19/2020 7:25 AM  Timeout: 3/19/2020 7:25 AM    Staffing  Authorizing Provider: Brody Mike Jr., CRNA  Performing Provider: Brody Mike Jr., CRNA    Preanesthetic Checklist  Completed: patient identified, site marked, surgical consent, pre-op evaluation, timeout performed, IV checked, risks and benefits discussed and monitors and equipment checked  Spinal Block  Patient position: sitting  Prep: Betadine  Patient monitoring: heart rate, cardiac monitor and continuous pulse ox  Approach: midline  Location: L3-4  Injection technique: single shot  CSF Fluid: clear free-flowing CSF  Needle  Needle type: Evelin   Needle gauge: 25 G  Needle length: 3.5 in  Additional Documentation: no paresthesia on injection and negative aspiration for heme  Needle localization: anatomical landmarks  Assessment  Sensory level: T6   Dermatomal levels determined by alcohol wipe  Ease of block: easy  Patient's tolerance of the procedure: comfortable throughout block and no complaints

## 2020-03-19 NOTE — L&D DELIVERY NOTE
"[facility]   Section   Operative Note    SUMMARY     Date of Procedure: 3/19/2020     Procedure: Procedure(s) (LRB):   SECTION (N/A)    Surgeon(s) and Role:     * Shira Mcrae MD - Primary    Assistant:  Татьяна Coleman PA-C, assistance necessary for completion of the surgery    Pre-Operative Diagnosis: Pre-eclampsia affecting pregnancy, antepartum [O14.90]  Previous  delivery affecting pregnancy, antepartum [O34.219]    Post-Operative Diagnosis: Post-Op Diagnosis Codes:     * Pre-eclampsia affecting pregnancy, antepartum [O14.90]     * Previous  delivery affecting pregnancy, antepartum [O34.219]    Anesthesia: Spinal/Epidural    Technical Procedures Used: Repeat low transverse  delivery via Pfannensteil skin incision           Description of the Findings of the Procedure:   Normal uterus, tubes, and ovaries.  Clear amniotic fluid.  Female infant ("[place]"), cephalic, 5lb9oz.  3 vessel cord.  Placenta delivered spontaneously, intact.    Significant Surgical Tasks Conducted by the Assistant(s), if Applicable: retraction, exposure, skin closure    Complications: No    QBL:  430 cc     With patient in supine position, the legs are  and Mcbride Catheter placed and positioning to supine done.   Abdomen prepped with Chloroprep and 3 minute drying time allowed prior to draping of the abdomen.   Time out taken with OR team members.  Pfannenstiel Incision made through the skin, transverse fascial incision developed,  rectus muscles  in the midline and the peritoneum entered.   no adhesions noted.  The lower uterine segment and position of the fetus identified.   Bladder flap taken down through transverse peritoneal incision.    Low Transverse Incision made through well developer lower uterine segment and extended laterally with blunt dissection.   Clear fluid noted.  Infant delivered from vertex presentation.  Cord clamped after one minute and  " handed to attending nurse.  Cord blood taken, placenta delivered.  The uterus wasnot exteriorized.  The edges of the uterine incision are grasped with Ahuja clamps at the angles and the inferior and superior midline edges of the incision.    Closure with running lock 0 Chromic, starting at each angle, tying in the midline.   Observation for bleeding with suture of any bleeding along the hysterotomy line.   With good hemostasis noted, the anterior pelvis is rinsed with sterile saline.   Right and left adnexa with normal anatomy.     Closure of the abdomen with 2 0 Vicryl horizontal mattress of the rectus muscles, fascial closure with 0 looped PDS starting at the left angle and tying the knot at the right angle.  Skin closure with 4 0 Monocryl subcuticular.  Wound dressed with Aquasel and pressure dressing.          Specimens:   Specimen (12h ago, onward)    None          Condition: Good    Disposition: PACU - hemodynamically stable.    Attestation: Good         Delivery Information for Girl Juliet Jalloh    Birth information:  YOB: 2021   Time of birth: 7:53 AM   Sex: female   Head Delivery Date/Time:     Delivery type:    Gestational Age: 34w5d    Delivery Providers    Delivering clinician:             Measurements    Weight:  2530 g  Length:  45.7 cm  Head circumference:  32.5 cm  Chest circumference:  29 cm  Abdominal girth:  30 cm         Apgars    Living status:    Apgars:   1 min.:   5 min.:   10 min.:   15 min.:   20 min.:     Skin color:          Heart rate:          Reflex irritability:          Muscle tone:          Respiratory effort:          Total:                                 Interventions/Resuscitation         Cord    No data filed        Placenta    Placenta delivery date/time:    Placenta removal:             Labor Events:       labor:       Labor Onset Date/Time:         Dilation Complete Date/Time:         Start Pushing Date/Time:         Start Pushing Date/Time:        Rupture Date/Time:              Rupture type:           Fluid Amount:        Fluid Color:        Fluid Odor:        Membrane Status:                 steroids:       Antibiotics given for GBS:       Induction:       Indications for induction:        Augmentation:       Indications for augmentation:       Labor complications:       Additional complications:          Cervical ripening:                     Delivery:      Episiotomy:       Indication for Episiotomy:       Perineal Lacerations:   Repaired:      Periurethral Laceration:   Repaired:     Labial Laceration:   Repaired:     Sulcus Laceration:   Repaired:     Vaginal Laceration:   Repaired:     Cervical Laceration:   Repaired:     Repair suture:       Repair # of packets:       Last Value - EBL - Nursing (mL):       Sum - EBL - Nursing (mL): 0     Last Value - EBL - Anesthesia (mL):      Calculated QBL (mL): 430      Vaginal Sweep Performed:       Surgicount Correct:         Other providers:            Details (if applicable):  Trial of Labor      Categorization:      Priority:     Indications for :     Incision Type:       Additional  information:  Forceps:    Vacuum:    Breech:    Observed anomalies    Other (Comments):

## 2020-03-19 NOTE — ANESTHESIA POSTPROCEDURE EVALUATION
Anesthesia Post Evaluation    Patient: Juliet Jalloh    Procedure(s) Performed: Procedure(s) (LRB):   SECTION (N/A)    Final Anesthesia Type: spinal    Patient location during evaluation: labor & delivery  Patient participation: Yes- Able to Participate  Level of consciousness: awake and alert  Post-procedure vital signs: reviewed and stable  Pain management: adequate  Airway patency: patent    PONV status at discharge: No PONV  Anesthetic complications: no      Cardiovascular status: blood pressure returned to baseline  Respiratory status: unassisted and spontaneous ventilation  Hydration status: euvolemic  Follow-up not needed.          Vitals Value Taken Time   /79 3/19/2020  8:49 AM   Temp 36.7 °C (98.1 °F) 3/18/2020  7:11 PM   Pulse 72 3/19/2020  8:51 AM   Resp 18 3/18/2020  7:11 PM   SpO2 100 % 3/19/2020  8:51 AM   Vitals shown include unvalidated device data.      No case tracking events are documented in the log.      Pain/Kath Score: No data recorded

## 2020-03-20 PROCEDURE — 63600175 PHARM REV CODE 636 W HCPCS: Performed by: OBSTETRICS & GYNECOLOGY

## 2020-03-20 PROCEDURE — 25000003 PHARM REV CODE 250: Performed by: OBSTETRICS & GYNECOLOGY

## 2020-03-20 PROCEDURE — 11000001 HC ACUTE MED/SURG PRIVATE ROOM

## 2020-03-20 PROCEDURE — 99231 SBSQ HOSP IP/OBS SF/LOW 25: CPT | Mod: ,,, | Performed by: OBSTETRICS & GYNECOLOGY

## 2020-03-20 PROCEDURE — 99231 PR SUBSEQUENT HOSPITAL CARE,LEVL I: ICD-10-PCS | Mod: ,,, | Performed by: OBSTETRICS & GYNECOLOGY

## 2020-03-20 RX ORDER — DOCUSATE SODIUM 100 MG/1
200 CAPSULE, LIQUID FILLED ORAL DAILY
Status: DISCONTINUED | OUTPATIENT
Start: 2020-03-21 | End: 2020-03-23 | Stop reason: HOSPADM

## 2020-03-20 RX ADMIN — HYDROCODONE BITARTRATE AND ACETAMINOPHEN 1 TABLET: 10; 325 TABLET ORAL at 02:03

## 2020-03-20 RX ADMIN — DOCUSATE SODIUM 100 MG: 100 CAPSULE, LIQUID FILLED ORAL at 08:03

## 2020-03-20 RX ADMIN — HYDROCODONE BITARTRATE AND ACETAMINOPHEN 1 TABLET: 10; 325 TABLET ORAL at 06:03

## 2020-03-20 RX ADMIN — IBUPROFEN 800 MG: 800 TABLET, FILM COATED ORAL at 10:03

## 2020-03-20 RX ADMIN — PRENATAL VIT W/ FE FUMARATE-FA TAB 27-0.8 MG 1 TABLET: 27-0.8 TAB at 08:03

## 2020-03-20 RX ADMIN — KETOROLAC TROMETHAMINE 30 MG: 30 INJECTION, SOLUTION INTRAMUSCULAR; INTRAVENOUS at 05:03

## 2020-03-20 RX ADMIN — HYDROCODONE BITARTRATE AND ACETAMINOPHEN 1 TABLET: 5; 325 TABLET ORAL at 09:03

## 2020-03-20 RX ADMIN — ACETAMINOPHEN 650 MG: 500 TABLET ORAL at 09:03

## 2020-03-20 RX ADMIN — HYDROCODONE BITARTRATE AND ACETAMINOPHEN 1 TABLET: 10; 325 TABLET ORAL at 04:03

## 2020-03-20 RX ADMIN — ENOXAPARIN SODIUM 40 MG: 100 INJECTION SUBCUTANEOUS at 08:03

## 2020-03-20 RX ADMIN — HYDROCODONE BITARTRATE AND ACETAMINOPHEN 1 TABLET: 10; 325 TABLET ORAL at 12:03

## 2020-03-20 NOTE — PROGRESS NOTES
[facility] -   Obstetrics  Postpartum Progress Note    Patient Name: Juliet Jalloh  MRN: [id number]  Admission Date: 3/16/2020  Hospital Length of Stay: 3 days  Attending Physician: Gale Engle MD  Primary Care Provider: Primary Doctor No    Subjective:     Principal Problem:Status post repeat low transverse  section    Hospital course: Admission for severe preeclampsia.  IV Magnesium sulfate therapy  OB HTN protocol  3/17/20-pcr .32, magnesium sulfate infusing, 2nd celestone due 3/18 early am; anticipate rpt c/section on 3/19 (24 hr after 2nd steroid dose if bp allows)  3/18/20-s/p 2nd dose of magnesium sulfate earlier this am; bp stable off magnesium sulfate  3/19/20: Patient underwent a repeat LTCS for severe preeclampsia.  She delivered a viable female infant.   cc.  Normal post op course.    Interval History:   No complaints.  No PIH symptoms.  Pumping for baby in NICU.  Ambulating, voiding, and tolerating regular diet.  Normal lochia.    Objective:     Vital Signs (Most Recent):  Temp: 98.8 °F (37.1 °C) (20 08)  Pulse: 100 (20 0829)  Resp: 18 (20 08)  BP: 126/78 (20 08)  SpO2: 100 % (20 08) Vital Signs (24h Range):  Temp:  [98 °F (36.7 °C)-98.8 °F (37.1 °C)] 98.8 °F (37.1 °C)  Pulse:  [] 100  Resp:  [18-20] 18  SpO2:  [100 %] 100 %  BP: (112-154)/(58-95) 126/78     Weight: 117.9 kg (260 lb)  Body mass index is 41.97 kg/m².      Intake/Output Summary (Last 24 hours) at 3/20/2020 0850  Last data filed at 3/20/2020 0600  Gross per 24 hour   Intake --   Output 1800 ml   Net -1800 ml       Significant Labs:  Lab Results   Component Value Date    GROUPTRH O POS 2020    HEPBSAG Negative 2019    STREPBCULT STREPTOCOCCUS AGALACTIAE (GROUP B) 2017     Recent Labs   Lab 20  0615   HGB 8.8*   HCT 27.7*       I have personallly reviewed all pertinent lab results from the last 24 hours.    Physical Exam:    Constitutional: She is oriented to person, place, and time. She appears well-developed and well-nourished. No distress.      Neck: Neck supple.    Cardiovascular: Normal rate.     Pulmonary/Chest: Effort normal.        Abdominal: Soft. She exhibits no distension. There is no tenderness.             Musculoskeletal: She exhibits no edema or tenderness.       Neurological: She is alert and oriented to person, place, and time.     Psychiatric: She has a normal mood and affect.       Assessment/Plan:     24 y.o. female  for:    * Status post repeat low transverse  section  POD#1 - stable and doing well.  Continue routine post op care    Pre-eclampsia affecting pregnancy, antepartum  BP currently stable on no meds.  Continue to monitor closely.    Constipation  Due to zofran and iron   Colace while in hospital    Anemia complicating pregnancy  Stopped iron due to constipation, will re-start in hospital with colace    MDD (major depressive disorder)  Stable at present time.    Herpes simplex virus (HSV) infection  Prophylaxis begun 3/17/2020        Disposition: As patient meets milestones, will plan to discharge.    Irene Car MD  Obstetrics  [facility]

## 2020-03-20 NOTE — LACTATION NOTE
Lactation Rounds: mother states that she has pumped 4-5 times in the last 24 hours and is collecting drops-10 mL. Reinforced mechanism of milk production and mainteance as well as risks and implications of inadequate breast stimulation; encouraged mother to pump a minimum of 8 times each 24 hours to establish adequate milk supply. Mother states she has a double electric breast pump at home use, she believes it is a [employer]inoh.     Mother has questions regarding hand expression technique. Mother was taught hand expression of breastmilk/colostrum. She was instructed to:   Sit upright and lean forward, if possible.   When feasible, apply warm, wet compress over breasts for a few minutes.    Perform gentle breast massage.   Form a C with her hand and place it about 1 inch back from the areola with the nipple centered between her index finger and her thumb.   Press, compress, relax:  Using her finger and thumb, apply pressure in an inward direction toward the breast without stretching the tissue, compress the breast tissue between her finger and thumb, then relax her finger and thumb. Repeat process for a few minutes.   Rotate placement of finger and thumb on the breasts to facilitate emptying.   Collect expressed breastmilk/colostrum with a spoon or cup and feed immediately to the baby, if able.   If unable to feed immediately, place breastmilk/colostrum directly into a sterile storage container for later use. Place the babys breast milk label (with the date and time of collection and the names of mother's medications) on the container. Reviewed proper handling and storage of expressed breastmilk.   Patient effectively return demonstrated and verbalized understanding, drops collected.    Mother agrees to pump at this time. Mother teaches back correct cleaning of breast pump kit. Mother demonstrates proper use of pump and hands on pumping technique. Mother pumps, 27 mm flange size verified. Mother able to  collect drops while pumping. Mother wishes to hand express again and this time and is politely dismissive. Mother verbalizes understanding of all education and counseling. Mother denies any further needs at this time. Contact number provided, mother to call with needs and questions.

## 2020-03-20 NOTE — SUBJECTIVE & OBJECTIVE
Hospital course: Admission for severe preeclampsia.  IV Magnesium sulfate therapy  OB HTN protocol  3/17/20-pcr .32, magnesium sulfate infusing, 2nd celestone due 3/18 early am; anticipate rpt c/section on 3/19 (24 hr after 2nd steroid dose if bp allows)  3/18/20-s/p 2nd dose of magnesium sulfate earlier this am; bp stable off magnesium sulfate  3/19/20: Patient underwent a repeat LTCS for severe preeclampsia.  She delivered a viable female infant.   cc.  Normal post op course.    Interval History:   No complaints.  No PIH symptoms.  Pumping for baby in NICU.  Ambulating, voiding, and tolerating regular diet.  Normal lochia.    Objective:     Vital Signs (Most Recent):  Temp: 98.8 °F (37.1 °C) (03/20/20 0829)  Pulse: 100 (03/20/20 0829)  Resp: 18 (03/20/20 0829)  BP: 126/78 (03/20/20 0829)  SpO2: 100 % (03/20/20 0829) Vital Signs (24h Range):  Temp:  [98 °F (36.7 °C)-98.8 °F (37.1 °C)] 98.8 °F (37.1 °C)  Pulse:  [] 100  Resp:  [18-20] 18  SpO2:  [100 %] 100 %  BP: (112-154)/(58-95) 126/78     Weight: 117.9 kg (260 lb)  Body mass index is 41.97 kg/m².      Intake/Output Summary (Last 24 hours) at 3/20/2020 0850  Last data filed at 3/20/2020 0600  Gross per 24 hour   Intake --   Output 1800 ml   Net -1800 ml       Significant Labs:  Lab Results   Component Value Date    GROUPTRH O POS 03/19/2020    HEPBSAG Negative 12/08/2019    STREPBCULT STREPTOCOCCUS AGALACTIAE (GROUP B) 07/18/2017     Recent Labs   Lab 03/19/20  0615   HGB 8.8*   HCT 27.7*       I have personallly reviewed all pertinent lab results from the last 24 hours.    Physical Exam:   Constitutional: She is oriented to person, place, and time. She appears well-developed and well-nourished. No distress.      Neck: Neck supple.    Cardiovascular: Normal rate.     Pulmonary/Chest: Effort normal.        Abdominal: Soft. She exhibits no distension. There is no tenderness.             Musculoskeletal: She exhibits no edema or tenderness.        Neurological: She is alert and oriented to person, place, and time.     Psychiatric: She has a normal mood and affect.

## 2020-03-20 NOTE — PLAN OF CARE
VS stable. Fundus firm and midline. Pain managed well. Discharge teaching reviewed. Meds reviewed and given as ordered.

## 2020-03-21 PROBLEM — O09.893 PRIOR PREGNANCY COMPLICATED BY PIH, ANTEPARTUM, THIRD TRIMESTER: Status: RESOLVED | Noted: 2020-01-24 | Resolved: 2020-03-21

## 2020-03-21 PROCEDURE — 11000001 HC ACUTE MED/SURG PRIVATE ROOM

## 2020-03-21 PROCEDURE — 25000003 PHARM REV CODE 250: Performed by: OBSTETRICS & GYNECOLOGY

## 2020-03-21 PROCEDURE — 99231 PR SUBSEQUENT HOSPITAL CARE,LEVL I: ICD-10-PCS | Mod: ,,, | Performed by: OBSTETRICS & GYNECOLOGY

## 2020-03-21 PROCEDURE — 63600175 PHARM REV CODE 636 W HCPCS: Performed by: OBSTETRICS & GYNECOLOGY

## 2020-03-21 PROCEDURE — 99231 SBSQ HOSP IP/OBS SF/LOW 25: CPT | Mod: ,,, | Performed by: OBSTETRICS & GYNECOLOGY

## 2020-03-21 RX ORDER — AMOXICILLIN 250 MG
1 CAPSULE ORAL 2 TIMES DAILY
Status: DISCONTINUED | OUTPATIENT
Start: 2020-03-21 | End: 2020-03-23 | Stop reason: HOSPADM

## 2020-03-21 RX ORDER — VALACYCLOVIR HYDROCHLORIDE 500 MG/1
500 TABLET, FILM COATED ORAL 2 TIMES DAILY
Status: DISCONTINUED | OUTPATIENT
Start: 2020-03-21 | End: 2020-03-23 | Stop reason: HOSPADM

## 2020-03-21 RX ADMIN — IBUPROFEN 800 MG: 800 TABLET, FILM COATED ORAL at 06:03

## 2020-03-21 RX ADMIN — SENNOSIDES AND DOCUSATE SODIUM 1 TABLET: 8.6; 5 TABLET ORAL at 09:03

## 2020-03-21 RX ADMIN — VALACYCLOVIR HYDROCHLORIDE 500 MG: 500 TABLET, FILM COATED ORAL at 09:03

## 2020-03-21 RX ADMIN — PRENATAL VIT W/ FE FUMARATE-FA TAB 27-0.8 MG 1 TABLET: 27-0.8 TAB at 09:03

## 2020-03-21 RX ADMIN — DOCUSATE SODIUM 200 MG: 100 CAPSULE, LIQUID FILLED ORAL at 09:03

## 2020-03-21 RX ADMIN — HYDROCODONE BITARTRATE AND ACETAMINOPHEN 1 TABLET: 10; 325 TABLET ORAL at 10:03

## 2020-03-21 RX ADMIN — HYDROCODONE BITARTRATE AND ACETAMINOPHEN 1 TABLET: 10; 325 TABLET ORAL at 09:03

## 2020-03-21 RX ADMIN — HYDROCODONE BITARTRATE AND ACETAMINOPHEN 1 TABLET: 10; 325 TABLET ORAL at 03:03

## 2020-03-21 RX ADMIN — IBUPROFEN 800 MG: 800 TABLET, FILM COATED ORAL at 01:03

## 2020-03-21 RX ADMIN — IBUPROFEN 800 MG: 800 TABLET, FILM COATED ORAL at 10:03

## 2020-03-21 RX ADMIN — ENOXAPARIN SODIUM 40 MG: 100 INJECTION SUBCUTANEOUS at 04:03

## 2020-03-21 RX ADMIN — HYDROCODONE BITARTRATE AND ACETAMINOPHEN 1 TABLET: 10; 325 TABLET ORAL at 04:03

## 2020-03-21 NOTE — PROGRESS NOTES
[facility] -   Obstetrics  Postpartum Progress Note    Patient Name: Juliet Jalloh  MRN: [id number]  Admission Date: 3/16/2020  Hospital Length of Stay: 4 days  Attending Physician: Gale Engle MD  Primary Care Provider: Primary Doctor No    Subjective:     Principal Problem:Status post repeat low transverse  section    Hospital course: Admission for severe preeclampsia.  IV Magnesium sulfate therapy  OB HTN protocol  3/17/20-pcr .32, magnesium sulfate infusing, 2nd celestone due 3/18 early am; anticipate rpt c/section on 3/19 (24 hr after 2nd steroid dose if bp allows)  3/18/20-s/p 2nd dose of magnesium sulfate earlier this am; bp stable off magnesium sulfate  3/19/20: Patient underwent a repeat LTCS for severe preeclampsia.  She delivered a viable female infant.   cc.  Normal post op course.    Interval History:   Pod #2    She is doing well this morning. She is tolerating a regular diet without nausea or vomiting. She is voiding spontaneously. She is ambulating. She has passed flatus, and has not a BM. Vaginal bleeding is mild. She denies fever or chills. Abdominal pain is mild and controlled with oral medications. She is breastfeeding and pumping.       Objective:     Vital Signs (Most Recent):  Temp: 98.1 °F (36.7 °C) (20 08)  Pulse: 105 (20 08)  Resp: 20 (20 0420)  BP: 126/78 (20 0826)  SpO2: 100 % (20 0826) Vital Signs (24h Range):  Temp:  [98.1 °F (36.7 °C)-99.5 °F (37.5 °C)] 98.1 °F (36.7 °C)  Pulse:  [104-118] 105  Resp:  [20] 20  SpO2:  [97 %-100 %] 100 %  BP: (116-133)/(62-95) 126/78     Weight: 117.9 kg (260 lb)  Body mass index is 41.97 kg/m².    No intake or output data in the 24 hours ending 20 0834    Significant Labs:  Lab Results   Component Value Date    GROUPTRH O POS 2020    HEPBSAG Negative 2019    STREPBCULT STREPTOCOCCUS AGALACTIAE (GROUP B) 2017     No results for input(s): HGB, HCT in the  last 48 hours.    I have personallly reviewed all pertinent lab results from the last 24 hours.  Recent Lab Results     None          Physical Exam:   Constitutional: She is oriented to person, place, and time. She appears well-developed.    HENT:   Head: Normocephalic.    Eyes: Pupils are equal, round, and reactive to light.    Neck: Normal range of motion.    Cardiovascular: Normal rate and regular rhythm.     Pulmonary/Chest: Effort normal and breath sounds normal.        Abdominal: Soft. Bowel sounds are normal. She exhibits no abdominal incision (aquasel dressing intact, no errythema, exudate, induration).     Genitourinary:   Genitourinary Comments: Ut-fundus--firm, non tender           Musculoskeletal: Normal range of motion and moves all extremeties. She exhibits no edema.       Neurological: She is alert and oriented to person, place, and time. She has normal reflexes.    Skin: Skin is warm and dry.    Psychiatric: She has a normal mood and affect. Her behavior is normal. Judgment and thought content normal.       Assessment/Plan:     24 y.o. female  for:    * Status post repeat low transverse  section  POD#2 - stable and doing well.  Afebrile overnight , continue dietary advance,   Encourage ambulation and deep breathing exercises  Infant doing well in nicu; anticipate discharge on pod #4    Pre-eclampsia affecting pregnancy, antepartum  BP currently stable on no meds.  Continue to monitor closely.    Constipation  Due to zofran and iron   Will add pericolace     Anemia complicating pregnancy  Stopped iron due to constipation, will re-start in hospital with colace  Denies hypovolemic symptoms    MDD (major depressive disorder)  Stable at present time.  Declines need to resume welbutrin    Herpes simplex virus (HSV) infection  Prophylaxis begun 3/17/2020  Continue valacylovir        Disposition: As patient meets milestones, will plan to discharge in 2 days.    Poonam Galdamez,  MD  Obstetrics  [facility]

## 2020-03-21 NOTE — SUBJECTIVE & OBJECTIVE
Hospital course: Admission for severe preeclampsia.  IV Magnesium sulfate therapy  OB HTN protocol  3/17/20-pcr .32, magnesium sulfate infusing, 2nd celestone due 3/18 early am; anticipate rpt c/section on 3/19 (24 hr after 2nd steroid dose if bp allows)  3/18/20-s/p 2nd dose of magnesium sulfate earlier this am; bp stable off magnesium sulfate  3/19/20: Patient underwent a repeat LTCS for severe preeclampsia.  She delivered a viable female infant.   cc.  Normal post op course.    Interval History:   Pod #2    She is doing well this morning. She is tolerating a regular diet without nausea or vomiting. She is voiding spontaneously. She is ambulating. She has passed flatus, and has not a BM. Vaginal bleeding is mild. She denies fever or chills. Abdominal pain is mild and controlled with oral medications. She is breastfeeding and pumping.       Objective:     Vital Signs (Most Recent):  Temp: 98.1 °F (36.7 °C) (03/21/20 0826)  Pulse: 105 (03/21/20 0826)  Resp: 20 (03/21/20 0420)  BP: 126/78 (03/21/20 0826)  SpO2: 100 % (03/21/20 0826) Vital Signs (24h Range):  Temp:  [98.1 °F (36.7 °C)-99.5 °F (37.5 °C)] 98.1 °F (36.7 °C)  Pulse:  [104-118] 105  Resp:  [20] 20  SpO2:  [97 %-100 %] 100 %  BP: (116-133)/(62-95) 126/78     Weight: 117.9 kg (260 lb)  Body mass index is 41.97 kg/m².    No intake or output data in the 24 hours ending 03/21/20 0834    Significant Labs:  Lab Results   Component Value Date    GROUPTRH O POS 03/19/2020    HEPBSAG Negative 12/08/2019    STREPBCULT STREPTOCOCCUS AGALACTIAE (GROUP B) 07/18/2017     No results for input(s): HGB, HCT in the last 48 hours.    I have personallly reviewed all pertinent lab results from the last 24 hours.  Recent Lab Results     None          Physical Exam:   Constitutional: She is oriented to person, place, and time. She appears well-developed.    HENT:   Head: Normocephalic.    Eyes: Pupils are equal, round, and reactive to light.    Neck: Normal range of  motion.    Cardiovascular: Normal rate and regular rhythm.     Pulmonary/Chest: Effort normal and breath sounds normal.        Abdominal: Soft. Bowel sounds are normal. She exhibits no abdominal incision (aquasel dressing intact, no errythema, exudate, induration).     Genitourinary:   Genitourinary Comments: Ut-fundus--firm, non tender           Musculoskeletal: Normal range of motion and moves all extremeties. She exhibits no edema.       Neurological: She is alert and oriented to person, place, and time. She has normal reflexes.    Skin: Skin is warm and dry.    Psychiatric: She has a normal mood and affect. Her behavior is normal. Judgment and thought content normal.

## 2020-03-21 NOTE — PLAN OF CARE
Plan of care reviewed. Fundus firm. Bonding with infant that is in NICU. Frequent checks made to ensure safety and comfort. Bed low, call bell within reach. Will continue to monitor.

## 2020-03-21 NOTE — ASSESSMENT & PLAN NOTE
Stopped iron due to constipation, will re-start in hospital with colace  Denies hypovolemic symptoms

## 2020-03-21 NOTE — PLAN OF CARE
Patient afebrile and had no falls this shift. Fundus firm without massage and below umbilicus. Bleeding light, no clots passed this shift. Voids spontaneously. Ambulates independently. Pain well controlled with oral pain medication. Vital signs stable at this time. Visits infant in NICU; Pumping breast milk frequently. Will continue to monitor.

## 2020-03-21 NOTE — ASSESSMENT & PLAN NOTE
POD#2 - stable and doing well.  Afebrile overnight , continue dietary advance,   Encourage ambulation and deep breathing exercises  Infant doing well in nicu; anticipate discharge on pod #4

## 2020-03-21 NOTE — LACTATION NOTE
"This note was copied from a baby's chart.  Lactation rounds  Mother states that pumping is going better. She is concerned about the future and how will she see her baby if "[place]". Reassured her and support given.   Reviewed hand expression with mother, she is able to demonstrate back.  She has a pump at home.   Will assist as needed.     "

## 2020-03-22 PROCEDURE — 99231 SBSQ HOSP IP/OBS SF/LOW 25: CPT | Mod: ,,, | Performed by: OBSTETRICS & GYNECOLOGY

## 2020-03-22 PROCEDURE — 99231 PR SUBSEQUENT HOSPITAL CARE,LEVL I: ICD-10-PCS | Mod: ,,, | Performed by: OBSTETRICS & GYNECOLOGY

## 2020-03-22 PROCEDURE — 25000003 PHARM REV CODE 250: Performed by: OBSTETRICS & GYNECOLOGY

## 2020-03-22 PROCEDURE — 63600175 PHARM REV CODE 636 W HCPCS: Performed by: OBSTETRICS & GYNECOLOGY

## 2020-03-22 PROCEDURE — 11000001 HC ACUTE MED/SURG PRIVATE ROOM

## 2020-03-22 RX ORDER — ADHESIVE BANDAGE
30 BANDAGE TOPICAL 2 TIMES DAILY
Status: DISCONTINUED | OUTPATIENT
Start: 2020-03-22 | End: 2020-03-23 | Stop reason: HOSPADM

## 2020-03-22 RX ADMIN — DOCUSATE SODIUM 200 MG: 100 CAPSULE, LIQUID FILLED ORAL at 08:03

## 2020-03-22 RX ADMIN — SENNOSIDES AND DOCUSATE SODIUM 1 TABLET: 8.6; 5 TABLET ORAL at 08:03

## 2020-03-22 RX ADMIN — IBUPROFEN 800 MG: 800 TABLET, FILM COATED ORAL at 06:03

## 2020-03-22 RX ADMIN — HYDROCODONE BITARTRATE AND ACETAMINOPHEN 1 TABLET: 10; 325 TABLET ORAL at 12:03

## 2020-03-22 RX ADMIN — MAGNESIUM HYDROXIDE 2400 MG: 400 SUSPENSION ORAL at 08:03

## 2020-03-22 RX ADMIN — SENNOSIDES AND DOCUSATE SODIUM 1 TABLET: 8.6; 5 TABLET ORAL at 09:03

## 2020-03-22 RX ADMIN — PRENATAL VIT W/ FE FUMARATE-FA TAB 27-0.8 MG 1 TABLET: 27-0.8 TAB at 08:03

## 2020-03-22 RX ADMIN — VALACYCLOVIR HYDROCHLORIDE 500 MG: 500 TABLET, FILM COATED ORAL at 09:03

## 2020-03-22 RX ADMIN — IBUPROFEN 800 MG: 800 TABLET, FILM COATED ORAL at 09:03

## 2020-03-22 RX ADMIN — VALACYCLOVIR HYDROCHLORIDE 500 MG: 500 TABLET, FILM COATED ORAL at 08:03

## 2020-03-22 RX ADMIN — HYDROCODONE BITARTRATE AND ACETAMINOPHEN 1 TABLET: 10; 325 TABLET ORAL at 06:03

## 2020-03-22 RX ADMIN — HYDROCODONE BITARTRATE AND ACETAMINOPHEN 1 TABLET: 10; 325 TABLET ORAL at 09:03

## 2020-03-22 RX ADMIN — ENOXAPARIN SODIUM 40 MG: 100 INJECTION SUBCUTANEOUS at 04:03

## 2020-03-22 RX ADMIN — IBUPROFEN 800 MG: 800 TABLET, FILM COATED ORAL at 01:03

## 2020-03-22 RX ADMIN — MAGNESIUM HYDROXIDE 2400 MG: 400 SUSPENSION ORAL at 09:03

## 2020-03-22 NOTE — ASSESSMENT & PLAN NOTE
POD#3 - stable and doing well.  Afebrile overnight , continue dietary advance, aware ok to shower   Encourage ambulation and deep breathing exercises  Infant doing well in nicu; anticipate discharge tomorrow on pod #4

## 2020-03-22 NOTE — PROGRESS NOTES
[facility] -   Obstetrics  Postpartum Progress Note    Patient Name: Juliet Jalloh  MRN: [id number]  Admission Date: 3/16/2020  Hospital Length of Stay: 5 days  Attending Physician: Gale Engle MD  Primary Care Provider: Primary Doctor No    Subjective:     Principal Problem:Status post repeat low transverse  section    Hospital course: Admission for severe preeclampsia.  IV Magnesium sulfate therapy  OB HTN protocol  3/17/20-pcr .32, magnesium sulfate infusing, 2nd celestone due 3/18 early am; anticipate rpt c/section on 3/19 (24 hr after 2nd steroid dose if bp allows)  3/18/20-s/p 2nd dose of magnesium sulfate earlier this am; bp stable off magnesium sulfate  3/19/20: Patient underwent a repeat LTCS for severe preeclampsia.  She delivered a viable female infant.   cc.  Normal post op course.    Interval History:   Pod #3    She is doing well this morning. She is tolerating a regular diet without nausea or vomiting. She is voiding spontaneously. She is ambulating. She has passed flatus, and has not a BM. Vaginal bleeding is mild. She denies fever or chills. Abdominal pain is mild and controlled with oral medications. She is breastfeeding.   Objective:     Vital Signs (Most Recent):  Temp: 98.4 °F (36.9 °C) (20 1200)  Pulse: 95 (20 1200)  Resp: 17 (20 1200)  BP: 125/79 (20 1200)  SpO2: 100 % (20 1624) Vital Signs (24h Range):  Temp:  [98.2 °F (36.8 °C)-98.6 °F (37 °C)] 98.4 °F (36.9 °C)  Pulse:  [] 95  Resp:  [16-18] 17  SpO2:  [100 %] 100 %  BP: (115-141)/(59-80) 125/79     Weight: 117.9 kg (260 lb)  Body mass index is 41.97 kg/m².    No intake or output data in the 24 hours ending 20 1239    Significant Labs:  Lab Results   Component Value Date    GROUPTRH O POS 2020    HEPBSAG Negative 2019    STREPBCULT STREPTOCOCCUS AGALACTIAE (GROUP B) 2017     No results for input(s): HGB, HCT in the last 48 hours.    I have  personallly reviewed all pertinent lab results from the last 24 hours.  Recent Lab Results     None          Physical Exam:   Constitutional: She is oriented to person, place, and time. She appears well-developed.    HENT:   Head: Normocephalic.    Eyes: Pupils are equal, round, and reactive to light.    Neck: Normal range of motion.    Cardiovascular: Normal rate and regular rhythm.     Pulmonary/Chest: Effort normal and breath sounds normal.        Abdominal: Soft. Bowel sounds are normal. She exhibits abdominal incision (aquasel dressing intact).     Genitourinary:   Genitourinary Comments: Fundus--firm non tender           Musculoskeletal: Normal range of motion. She exhibits no edema.       Neurological: She is alert and oriented to person, place, and time. She has normal reflexes.    Skin: Skin is warm and dry.    Psychiatric: She has a normal mood and affect. Her behavior is normal. Judgment and thought content normal.       Assessment/Plan:     24 y.o. female  for:    * Status post repeat low transverse  section  POD#3 - stable and doing well.  Afebrile overnight , continue dietary advance, aware ok to shower   Encourage ambulation and deep breathing exercises  Infant doing well in nicu; anticipate discharge tomorrow on pod #4    Pre-eclampsia affecting pregnancy, antepartum  BP currently stable on no meds.  Continue to monitor closely.    Constipation  Due to zofran and iron   Will add pericolace   3/22/20-still has not had bowel movement; milk of magnesium added to pericolace    Anemia complicating pregnancy  Stopped iron due to constipation, will re-start in hospital with colace  Denies hypovolemic symptoms    MDD (major depressive disorder)  Stable at present time.  Declines need to resume welbutrin    Herpes simplex virus (HSV) infection  Prophylaxis begun 3/17/2020  Continue valacylovir        Disposition: As patient meets milestones, will plan to discharge tomorrow.    Poonam Galdamez,  MD  Obstetrics  [facility]

## 2020-03-22 NOTE — ASSESSMENT & PLAN NOTE
Due to zofran and iron   Will add pericolace   3/22/20-still has not had bowel movement; milk of magnesium added to pericolace

## 2020-03-22 NOTE — PROGRESS NOTES
[facility]  Obstetrics  Postpartum Progress Note    Patient Name: Juliet Jalloh  MRN: [id number]  Admission Date: 3/16/2020  Hospital Length of Stay: 5 days  Attending Physician: Gale Engle MD  Primary Care Provider: Primary Doctor No    Subjective:     Principal Problem:Status post repeat low transverse  section    No new subjective & objective note has been filed under this hospital service since the last note was generated.    Assessment/Plan:     24 y.o. female  for:    * Status post repeat low transverse  section  POD#3 - stable and doing well.  Afebrile overnight , continue dietary advance, aware ok to shower   Encourage ambulation and deep breathing exercises  Infant doing well in nicu; anticipate discharge tomorrow on pod #4    Pre-eclampsia affecting pregnancy, antepartum  BP currently stable on no meds.  Continue to monitor closely.    Constipation  Due to zofran and iron   Will add pericolace   3/22/20-still has not had bowel movement; milk of magnesium added to pericolace    Anemia complicating pregnancy  Stopped iron due to constipation, will re-start in hospital with colace  Denies hypovolemic symptoms    MDD (major depressive disorder)  Stable at present time.  Declines need to resume welbutrin    Herpes simplex virus (HSV) infection  Prophylaxis begun 3/17/2020  Continue valacylovir        Disposition: As patient meets milestones, will plan to discharge tomorrow.    Poonam Galdamez MD  Obstetrics  [facility]

## 2020-03-22 NOTE — PLAN OF CARE
Patient afebrile and had no falls this shift. Fundus firm without massage and below umbilicus. Bleeding light, no clots passed this shift. Voids spontaneously. Ambulates independently. Pain well controlled with oral pain medication. Vital signs stable at this time. Visits infant in NICU. Will continue to monitor.

## 2020-03-22 NOTE — DISCHARGE INSTRUCTIONS
"Mother Self Care:    Activity: Avoid strenuous exercise and get adequate rest.  No driving until the physician consent given.  Emotional Changes: Most women find birth to be a time of great emotional upheaval.  Sense of loss, mood swings, fatigue, anxiety, and feeling "let down" are common.  If feelings worsen or last more than a week, call your physician.  Breast Care/Breastfeeding: Wear a bra for comfort.  Keep nipples dry and apply your own breast milk or lanolin cream as needed for soreness.  Engorgement can be relieved with warm, moist heat before feedings.  You may also take Ibuprofen.  Breast Care/Bottle Feeding: Wear support bra 24 hours a day for one week.  Avoid stimulation to breasts.  You may use ice packs for discomfort.  Ariela-Care/Vaginal Bleeding: Remember to use your ariela-bottle after urinating.  Your flow will change from red, to pink, to yellow/white color over a period of 2 weeks.  Menstruation will return in 3-8 weeks, or longer if breastfeeding.   Section/Tubal Ligation: Keep incision clean and dry. You may shower, but avoid baths.  Sexual Activity/Pelvic Rest: No sexual activity, tampons, or douching until your physician gives you consent.  Diet: Continue to eat from the five basic food groups, including plenty of protein, fruits, vegetables, and whole grains.  Limit empty calories and high fat foods.  Drink enough fluids to satisfy thirst and add an extra 500 calories for breastfeeding.  Constipation/Hemorrhoids: Drink plenty of water.  You may take a stool softener or natural laxative (Metamucil). You may use tucks or hemorrhoid ointment and soak in a warm tub.    CALL YOUR OB DOCTOR IF ANY OF THE FOLLOWING OCCURS:  *Heavy bleeding - saturating a pad an hour or passing any large (2-3 inches in size) blood clots.  *Any pain, redness, or tenderness in lower leg.  *You cannot care for yourself or your baby.  *Any signs of infection-      - Temperature greater than 100.5 degrees F      - " Foul smelling vaginal discharge and/or incisional drainage      - Increased episiotomy or incisional pain      - Hot, hard, red or sore area on breast      - Flu-like symptoms      - Any urgency, frequency or burning with urination    Return To the Hospital for further Evaluation:  · Headache not relieved by tylenol or ibuprofen  · Blurry vision, double vision, seeing spots, or flashing lights  · Feeling faint or passing out  · Right epigastric pain  · Difficulty breathing  · Swelling in hands, face, or feet  · Any of these symptoms accompanied by nausea/vomiting  · Gaining more than 5 pounds in one week  · Seizures  These symptoms could be an indication of elevated blood pressure.       If you have any questions that need to be answered immediately please call the Labor & Delivery Unit at [phone] and ask to speak to a nurse.

## 2020-03-22 NOTE — LACTATION NOTE
Lactation Rounds: mother states that she has pumped 5 times in the last 24 hours and is collecting 3-4 oz per pumping session. Discussed no the need to discontinue the use of the initiate phase and now use the maintenance phase. Reinforced proper breast milk handling, storage and transportation. Reviewed engorgement measure, mastitis and when to call the doctor and lactation. Mother able to return demonstrate hands on pumping, hand expression and nipple care. Mother able to teach back: proper pumping frequency (8 or more in 24) and duration (until empty), proper cleaning of kit, proper hands on pumping, proper collection storage and transportation of EBM, engorgement and mastitis when to call lactation and doctor and outpatient lactation outpatient resources. Mother has double electric pump for home use. Mother denies any further lactation needs or concerns at this time. Mother to call for assistance as needed, contact numbers provided.

## 2020-03-22 NOTE — SUBJECTIVE & OBJECTIVE
Hospital course: Admission for severe preeclampsia.  IV Magnesium sulfate therapy  OB HTN protocol  3/17/20-pcr .32, magnesium sulfate infusing, 2nd celestone due 3/18 early am; anticipate rpt c/section on 3/19 (24 hr after 2nd steroid dose if bp allows)  3/18/20-s/p 2nd dose of magnesium sulfate earlier this am; bp stable off magnesium sulfate  3/19/20: Patient underwent a repeat LTCS for severe preeclampsia.  She delivered a viable female infant.   cc.  Normal post op course.    Interval History:   Pod #3    She is doing well this morning. She is tolerating a regular diet without nausea or vomiting. She is voiding spontaneously. She is ambulating. She has passed flatus, and has not a BM. Vaginal bleeding is mild. She denies fever or chills. Abdominal pain is mild and controlled with oral medications. She is breastfeeding.   Objective:     Vital Signs (Most Recent):  Temp: 98.4 °F (36.9 °C) (03/22/20 1200)  Pulse: 95 (03/22/20 1200)  Resp: 17 (03/22/20 1200)  BP: 125/79 (03/22/20 1200)  SpO2: 100 % (03/21/20 1624) Vital Signs (24h Range):  Temp:  [98.2 °F (36.8 °C)-98.6 °F (37 °C)] 98.4 °F (36.9 °C)  Pulse:  [] 95  Resp:  [16-18] 17  SpO2:  [100 %] 100 %  BP: (115-141)/(59-80) 125/79     Weight: 117.9 kg (260 lb)  Body mass index is 41.97 kg/m².    No intake or output data in the 24 hours ending 03/22/20 1239    Significant Labs:  Lab Results   Component Value Date    GROUPTRH O POS 03/19/2020    HEPBSAG Negative 12/08/2019    STREPBCULT STREPTOCOCCUS AGALACTIAE (GROUP B) 07/18/2017     No results for input(s): HGB, HCT in the last 48 hours.    I have personallly reviewed all pertinent lab results from the last 24 hours.  Recent Lab Results     None          Physical Exam:   Constitutional: She is oriented to person, place, and time. She appears well-developed.    HENT:   Head: Normocephalic.    Eyes: Pupils are equal, round, and reactive to light.    Neck: Normal range of motion.    Cardiovascular:  Normal rate and regular rhythm.     Pulmonary/Chest: Effort normal and breath sounds normal.        Abdominal: Soft. Bowel sounds are normal. She exhibits abdominal incision (aquasel dressing intact).     Genitourinary:   Genitourinary Comments: Fundus--firm non tender           Musculoskeletal: Normal range of motion. She exhibits no edema.       Neurological: She is alert and oriented to person, place, and time. She has normal reflexes.    Skin: Skin is warm and dry.    Psychiatric: She has a normal mood and affect. Her behavior is normal. Judgment and thought content normal.

## 2020-03-22 NOTE — PROGRESS NOTES
Patient states she has not had a bowel movement in a week or 2. Has the urge to go, but unable to, stool softeners do not seem to help. Patient asks can she have milk of mag, has helped in the past. Notified cassia Nichole to order milk of mag, 2 tablespoons followed by 8 oz. Of water BID. Will continue to monitor.

## 2020-03-23 ENCOUNTER — TELEPHONE (OUTPATIENT)
Dept: PHARMACY | Facility: CLINIC | Age: 25
End: 2020-03-23

## 2020-03-23 ENCOUNTER — TELEPHONE (OUTPATIENT)
Dept: OBSTETRICS AND GYNECOLOGY | Facility: CLINIC | Age: 25
End: 2020-03-23

## 2020-03-23 VITALS
TEMPERATURE: 98 F | DIASTOLIC BLOOD PRESSURE: 62 MMHG | BODY MASS INDEX: 41.97 KG/M2 | RESPIRATION RATE: 20 BRPM | OXYGEN SATURATION: 100 % | WEIGHT: 260 LBS | HEART RATE: 83 BPM | SYSTOLIC BLOOD PRESSURE: 108 MMHG

## 2020-03-23 PROCEDURE — 99238 HOSP IP/OBS DSCHRG MGMT 30/<: CPT | Mod: ,,, | Performed by: PHYSICIAN ASSISTANT

## 2020-03-23 PROCEDURE — 25000003 PHARM REV CODE 250: Performed by: OBSTETRICS & GYNECOLOGY

## 2020-03-23 PROCEDURE — 99238 PR HOSPITAL DISCHARGE DAY,<30 MIN: ICD-10-PCS | Mod: ,,, | Performed by: PHYSICIAN ASSISTANT

## 2020-03-23 RX ORDER — IBUPROFEN 800 MG/1
800 TABLET ORAL EVERY 8 HOURS
Qty: 30 TABLET | Refills: 1 | Status: SHIPPED | OUTPATIENT
Start: 2020-03-23

## 2020-03-23 RX ORDER — HYDROCODONE BITARTRATE AND ACETAMINOPHEN 5; 325 MG/1; MG/1
1 TABLET ORAL EVERY 6 HOURS PRN
Qty: 15 TABLET | Refills: 0 | Status: SHIPPED | OUTPATIENT
Start: 2020-03-23 | End: 2020-03-26 | Stop reason: SDUPTHER

## 2020-03-23 RX ORDER — ACETAMINOPHEN AND CODEINE PHOSPHATE 120; 12 MG/5ML; MG/5ML
1 SOLUTION ORAL DAILY
Qty: 28 TABLET | Refills: 1 | Status: SHIPPED | OUTPATIENT
Start: 2020-03-23 | End: 2020-04-17 | Stop reason: SDUPTHER

## 2020-03-23 RX ORDER — VALACYCLOVIR HYDROCHLORIDE 500 MG/1
500 TABLET, FILM COATED ORAL 2 TIMES DAILY
Qty: 60 TABLET | Refills: 0 | Status: SHIPPED | OUTPATIENT
Start: 2020-03-23 | End: 2021-03-23

## 2020-03-23 RX ORDER — FLUOXETINE 10 MG/1
10 CAPSULE ORAL DAILY
Qty: 30 CAPSULE | Refills: 1 | Status: SHIPPED | OUTPATIENT
Start: 2020-03-23 | End: 2020-04-17 | Stop reason: SDUPTHER

## 2020-03-23 RX ADMIN — PRENATAL VIT W/ FE FUMARATE-FA TAB 27-0.8 MG 1 TABLET: 27-0.8 TAB at 08:03

## 2020-03-23 RX ADMIN — VALACYCLOVIR HYDROCHLORIDE 500 MG: 500 TABLET, FILM COATED ORAL at 08:03

## 2020-03-23 RX ADMIN — HYDROCODONE BITARTRATE AND ACETAMINOPHEN 1 TABLET: 10; 325 TABLET ORAL at 09:03

## 2020-03-23 RX ADMIN — IBUPROFEN 800 MG: 800 TABLET, FILM COATED ORAL at 05:03

## 2020-03-23 RX ADMIN — DOCUSATE SODIUM 200 MG: 100 CAPSULE, LIQUID FILLED ORAL at 08:03

## 2020-03-23 RX ADMIN — HYDROCODONE BITARTRATE AND ACETAMINOPHEN 1 TABLET: 10; 325 TABLET ORAL at 05:03

## 2020-03-23 NOTE — NURSING
VSS. Pt very anxious about leaving infant, reassured that she will be able to visit. Discharge instructions reviewed with pt and she verbalizes understanding. Dressing removal appt this week, pt verbalizes time date and place of appt. AVS handout given. Pt refused a wheelchair, was walked out to car with staff.

## 2020-03-23 NOTE — SUBJECTIVE & OBJECTIVE
"Hospital course: Admission for severe preeclampsia.  IV Magnesium sulfate therapy  OB HTN protocol  3/17/20-pcr .32, magnesium sulfate infusing, 2nd celestone due 3/18 early am; anticipate rpt c/section on 3/19 (24 hr after 2nd steroid dose if bp allows)  3/18/20-s/p 2nd dose of magnesium sulfate earlier this am; bp stable off magnesium sulfate  3/19/20: Patient underwent a repeat LTCS for severe preeclampsia.  She delivered a viable female infant.   cc.  Normal post op course.    Interval History:     She is doing well this morning. She is tolerating a regular diet without nausea or vomiting. She is voiding spontaneously. She is ambulating. She has passed flatus, and has not a BM. Vaginal bleeding is mild. She denies fever or chills. Abdominal pain is mild and controlled with oral medications. She is pumping for infant in the NICU. Casie BENSON or vision changes. Feeling anxious like post partum depression is "starting to kick in". Denies SI or HI. Feeling very overwhelmed with situation and worried. Has hx of panic attacks    Objective:     Vital Signs (Most Recent):  Temp: 98.3 °F (36.8 °C) (03/23/20 0721)  Pulse: 83 (03/23/20 0721)  Resp: 20 (03/23/20 0721)  BP: 108/62 (03/23/20 0721)  SpO2: 100 % (03/23/20 0419) Vital Signs (24h Range):  Temp:  [97.7 °F (36.5 °C)-98.9 °F (37.2 °C)] 98.3 °F (36.8 °C)  Pulse:  [] 83  Resp:  [17-20] 20  SpO2:  [97 %-100 %] 100 %  BP: (108-135)/(62-80) 108/62     Weight: 117.9 kg (260 lb)  Body mass index is 41.97 kg/m².    No intake or output data in the 24 hours ending 03/23/20 0758    Significant Labs:  Lab Results   Component Value Date    GROUPTRH O POS 03/19/2020    HEPBSAG Negative 12/08/2019    STREPBCULT STREPTOCOCCUS AGALACTIAE (GROUP B) 07/18/2017     No results for input(s): HGB, HCT in the last 48 hours.    I have personallly reviewed all pertinent lab results from the last 24 hours.    Physical Exam:   Constitutional: She is oriented to person, place, and " time. She appears well-developed and well-nourished. No distress.       Cardiovascular: Normal rate.     Pulmonary/Chest: Effort normal. No respiratory distress.        Abdominal: Soft. She exhibits abdominal incision (Aquacel dressing in place, dry staining, intact). She exhibits no distension. There is no tenderness. There is no guarding.   Uterine fundus firm, below umbilicus, mild tenderness (appropriate)             Musculoskeletal: Moves all extremeties. She exhibits no edema.   No calf tenderness       Neurological: She is alert and oriented to person, place, and time. She has normal reflexes.    Skin: Skin is warm and dry. No rash noted. She is not diaphoretic.

## 2020-03-23 NOTE — PLAN OF CARE
Patient afebrile and had no falls this shift. Fundus firm without massage and below umbilicus. Bleeding light, no clots passed this shift. Voids spontaneously. Ambulates independently. Pain well controlled with oral pain medication. Vital signs stable at this time. Visits infant in NICU, continues to pump frequently. Will continue to monitor.

## 2020-03-23 NOTE — PROGRESS NOTES
"[facility]  Obstetrics  Postpartum Progress Note    Patient Name: Juliet Jalloh  MRN: [id number]  Admission Date: 3/16/2020  Hospital Length of Stay: 6 days  Attending Physician: Gale Engle MD  Primary Care Provider: Primary Doctor No    Subjective:     Principal Problem:Status post repeat low transverse  section    Hospital course: Admission for severe preeclampsia.  IV Magnesium sulfate therapy  OB HTN protocol  3/17/20-pcr .32, magnesium sulfate infusing, 2nd celestone due 3/18 early am; anticipate rpt c/section on 3/19 (24 hr after 2nd steroid dose if bp allows)  3/18/20-s/p 2nd dose of magnesium sulfate earlier this am; bp stable off magnesium sulfate  3/19/20: Patient underwent a repeat LTCS for severe preeclampsia.  She delivered a viable female infant.   cc.  Normal post op course.    Interval History:     She is doing well this morning. She is tolerating a regular diet without nausea or vomiting. She is voiding spontaneously. She is ambulating. She has passed flatus, and has not a BM. Vaginal bleeding is mild. She denies fever or chills. Abdominal pain is mild and controlled with oral medications. She is pumping for infant in the NICU. Casie BENSON or vision changes. Feeling anxious like post partum depression is "starting to kick in". Denies SI or HI. Feeling very overwhelmed with situation and worried. Has hx of panic attacks    Objective:     Vital Signs (Most Recent):  Temp: 98.3 °F (36.8 °C) (20 0721)  Pulse: 83 (20 0721)  Resp: 20 (20 0721)  BP: 108/62 (20 0721)  SpO2: 100 % (20 0419) Vital Signs (24h Range):  Temp:  [97.7 °F (36.5 °C)-98.9 °F (37.2 °C)] 98.3 °F (36.8 °C)  Pulse:  [] 83  Resp:  [17-20] 20  SpO2:  [97 %-100 %] 100 %  BP: (108-135)/(62-80) 108/62     Weight: 117.9 kg (260 lb)  Body mass index is 41.97 kg/m².    No intake or output data in the 24 hours ending 20 0758    Significant Labs:  Lab Results "   Component Value Date    GROUPTRH O POS 2020    HEPBSAG Negative 2019    STREPBCULT STREPTOCOCCUS AGALACTIAE (GROUP B) 2017     No results for input(s): HGB, HCT in the last 48 hours.    I have personallly reviewed all pertinent lab results from the last 24 hours.    Physical Exam:   Constitutional: She is oriented to person, place, and time. She appears well-developed and well-nourished. No distress.       Cardiovascular: Normal rate.     Pulmonary/Chest: Effort normal. No respiratory distress.        Abdominal: Soft. She exhibits abdominal incision (Aquacel dressing in place, dry staining, intact). She exhibits no distension. There is no tenderness. There is no guarding.   Uterine fundus firm, below umbilicus, mild tenderness (appropriate)             Musculoskeletal: Moves all extremeties. She exhibits no edema.   No calf tenderness       Neurological: She is alert and oriented to person, place, and time. She has normal reflexes.    Skin: Skin is warm and dry. No rash noted. She is not diaphoretic.        Assessment/Plan:     24 y.o. female  for:    * Status post repeat low transverse  section  POD#4 - stable and doing well.  Plan to discharge today. Planning for Nexplanon, in interim will start progestin pill    Pre-eclampsia affecting pregnancy, antepartum  BP currently stable on no meds.  Continue to monitor closely.    Constipation  Continue pericolace and milk of magnesia    Anemia complicating pregnancy  Stopped iron due to constipation, will re-start in hospital with colace  Denies hypovolemic symptoms    MDD (major depressive disorder)  Willing to try antidepressant, also interested in resuming counseling however current access may be limited. Will start Prozac    Herpes simplex virus (HSV) infection  Prophylaxis begun 3/17/2020  Continue valacylovir        Disposition: will plan to discharge today.    Татьяна Coleman PA-C  Obstetrics  [facility]

## 2020-03-23 NOTE — PLAN OF CARE
COPIED FROM BABY'S CHART:    Mom's utox resulted + for THC. Baby did not have utox screening. Meconium results pending. [facility] called into [facility] hotline at [phone]. Electronic report would be filed as well.     Mom reported THC use through CBD oil and CBD pills for anxiety and depression. Mom reported last use was day before coming into hospital on 3/16/2020. Mom has 2 year old son who also lives in the home. Mom reported she started using CBD in January.      Mitzy Cash LMSW [facility]     Phone: [phone]     [email or web address]

## 2020-03-23 NOTE — ASSESSMENT & PLAN NOTE
Willing to try antidepressant, also interested in resuming counseling however current access may be limited. Will start Prozac

## 2020-03-23 NOTE — TELEPHONE ENCOUNTER
Hello,    We received a prescription for Nexplanon for this patient, and unfortunately, [facility] is unable to provide this medication due to limited access and availability. This medication is available from your normal clinic buy and bill procedure for the facility administered medication. [facility] is unable to bill medical claims for medications. The pharmacies that can be billed for this buy and bill procedure are [facility] and [facility].    Thank you,  Nelson

## 2020-03-23 NOTE — LACTATION NOTE
Breastfeeding discharge education performed. Informed mother of the World Health Organization's recommendation for exclusive breastfeeding for the first 6 months of baby's life and continued breastfeeding after the introduction of solid foods for 2 years and beyond. Written instructions have been provided and were reviewed at this time. NICU breastfeeding guide and pumping plan reviewed.  Currently pumping q3-3.5 hours per mother and collecting ~2oz per breast per session. Encouraged mother to set alarm to pump every three hours. Reviewed hand expression. Reviewed pump parts- mother plans to keep pump parts for hospital pump at NICU bedside and use [employer]inoh pump at home. Advised mother to contact us if personal pump is not adequately emptying breasts once home. Mother voices understanding. Lactogenic tea and handout provided.

## 2020-03-23 NOTE — DISCHARGE SUMMARY
[facility]   Obstetrics  Discharge Summary      Patient Name: Juliet Jalloh  MRN: [id number]  Admission Date: 3/16/2020  Hospital Length of Stay: 6 days  Discharge Date and Time:  2020 8:08 AM  Attending Physician: Gale Engle MD   Discharging Provider: Татьяна Coleman PA-C   Primary Care Provider: Primary Doctor No    HPI: C/o headache, denies blurry vision or epigastric pain         Procedure(s) (LRB):   SECTION (N/A)     Hospital Course:   Admission for severe preeclampsia.  IV Magnesium sulfate therapy  OB HTN protocol  3/17/20-pcr .32, magnesium sulfate infusing, 2nd celestone due 3/18 early am; anticipate rpt c/section on 3/19 (24 hr after 2nd steroid dose if bp allows)  3/18/20-s/p 2nd dose of magnesium sulfate earlier this am; bp stable off magnesium sulfate  3/19/20: Patient underwent a repeat LTCS for severe preeclampsia.  She delivered a viable female infant.   cc.  Normal post op course.     Consults (From admission, onward)        Status Ordering Provider     Consult to Lactation  Use PRN     Provider:  (Not yet assigned)    Acknowledged DAUTERIVE, ZARIA          Final Active Diagnoses:    Diagnosis Date Noted POA    PRINCIPAL PROBLEM:  Status post repeat low transverse  section [Z98.891] 2020 Not Applicable    Pre-eclampsia affecting pregnancy, antepartum [O14.90] 2020 Yes    Constipation [K59.00] 2020 Yes    Anemia complicating pregnancy [O99.019] 2020 Yes    MDD (major depressive disorder) [F32.9] 10/04/2018 Yes    Herpes simplex virus (HSV) infection [B00.9]  Yes      Problems Resolved During this Admission:    Diagnosis Date Noted Date Resolved POA    Nausea and vomiting in pregnancy [O21.9] 2020 Yes    Depression with suicidal ideation [F32.9, R45.851] 2020 Not Applicable        Labs: All labs within the past 24 hours have been reviewed    Feeding Method: pumping for infant in  NICU    Immunizations     None          Delivery:    Episiotomy: None   Lacerations: None   Repair suture:     Repair # of packets:     Blood loss (ml): 0     Birth information:  YOB: 2021   Time of birth: 7:53 AM   Sex: female   Delivery type: , Low Transverse   Gestational Age: 34w5d    Delivery Clinician:      Other providers:       Additional  information:  Forceps:    Vacuum:    Breech:    Observed anomalies      Living?:           APGARS  One minute Five minutes Ten minutes   Skin color:         Heart rate:         Grimace:         Muscle tone:         Breathing:         Totals: 8  8        Placenta: Delivered:       appearance    Pending Diagnostic Studies:     None          Discharged Condition: good    Disposition: Home or Self Care    Follow Up:  Follow-up Information     [facility] In 1 week.    Why:  dressing removal and bp check/nexplanon order           Shira Mcrae MD In 6 weeks.    Specialties:  Obstetrics and Gynecology, Obstetrics  Contact information:  [address] [place] [address] [address] [place] [address]  [phone]                 Patient Instructions:      Diet Adult Regular     Other restrictions (specify):   Order Comments: Pelvic rest x 6 weeks (no tampons, intercourse douching); showers only for 6 wks; no lifting more than baby     Call MD for:  temperature >100.4     Call MD for:  severe uncontrolled pain     Call MD for:  redness, tenderness, or signs of infection (pain, swelling, redness, odor or green/yellow discharge around incision site)     Call MD for:  difficulty breathing or increased cough     Call MD for:  severe persistent headache     Call MD for:  persistent dizziness, light-headedness, or visual disturbances     Leave dressing on - Keep it clean, dry, and intact until clinic visit   Order Comments: Dressing will be removed at 1 week nurse visit.  Showers only- no baths for 6 weeks.     Medications:  Current Discharge Medication List      START  taking these medications    Details   FLUoxetine 10 MG capsule Take 1 capsule (10 mg total) by mouth once daily.  Qty: 30 capsule, Refills: 1      HYDROcodone-acetaminophen (NORCO) 5-325 mg per tablet Take 1 tablet by mouth every 6 (six) hours as needed for Pain.  Qty: 15 tablet, Refills: 0    Comments: Quantity prescribed more than 7 day supply? No      ibuprofen (ADVIL,MOTRIN) 800 MG tablet Take 1 tablet (800 mg total) by mouth every 8 (eight) hours.  Qty: 30 tablet, Refills: 1      norethindrone (MICRONOR) 0.35 mg tablet Take 1 tablet (0.35 mg total) by mouth once daily.  Qty: 30 tablet, Refills: 1      valACYclovir (VALTREX) 500 MG tablet Take 1 tablet (500 mg total) by mouth 2 (two) times daily.  Qty: 60 tablet, Refills: 0         CONTINUE these medications which have NOT CHANGED    Details   docusate sodium (COLACE) 100 MG capsule Take 1 capsule (100 mg total) by mouth 2 (two) times daily.  Qty: 30 capsule, Refills: 1      ferrous sulfate 325 (65 FE) MG EC tablet Take 1 tablet (325 mg total) by mouth 2 (two) times daily.  Qty: 60 tablet, Refills: 3      prenatal vits62/FA/om3/dha/epa (PRENATAL GUMMY ORAL) Take by mouth.         STOP taking these medications       aspirin 81 MG Chew Comments:   Reason for Stopping:         ondansetron (ZOFRAN) 4 MG tablet Comments:   Reason for Stopping:         promethazine (PHENERGAN) 25 MG suppository Comments:   Reason for Stopping:               Татьяна Coleman PA-C  Obstetrics  [facility]

## 2020-03-24 ENCOUNTER — DOCUMENTATION ONLY (OUTPATIENT)
Dept: REHABILITATION | Facility: HOSPITAL | Age: 25
End: 2020-03-24

## 2020-03-24 NOTE — PROGRESS NOTES
Outpatient Therapy Discharge Summary     Name: Juliet Lopes [place]  Clinic Number: [id number]    Therapy Diagnosis:        Encounter Diagnosis   Name Primary?    Pelvic pain affecting pregnancy in third trimester, antepartum        Physician: Wendy Payan CNM    Physician Orders: PT Eval and Treat  Medical Diagnosis from Referral: Pelvic pain affecting pregnancy in third trimester   Evaluation Date: 3/13/2020      Date of Last visit: 3/13/2020  Total Visits Received: 1  Cancelled Visits: 0  No Show Visits: 0    Assessment      GOALS:     Short Term Goals:  6 weeks     1. Pain: Pt will demonstrate improved pain by reports of less than or equal to 7/10 worst pain on the verbal rating scale in order to progress toward maximal functional ability and improve QOL.     2. Function: Patient will demonstrate improved function as indicated by a functional status score of less than or equal to 41 out of 100 on FOTO.     3. Strength: Patient will improve strength to 50% of stated goals, listed in objective measures above, in order to progress towards independence with functional activities.      4. Gait: Patient will demonstrate improved gait mechanics in order to improve functional mobility, improve quality of life, and decrease risk of further injury or fall.      5. HEP: Patient will demonstrate independence with HEP in order to progress toward functional independence.     6. Improve postural awareness.            Long Term Goals:  12 weeks     1. Pain: Pt will demonstrate improved pain by reports of less than or equal to 5/10 worst pain on the verbal rating scale in order to progress toward maximal functional ability and improve QOL.       2. Function: Patient will demonstrate improved function as indicated by a functional status score of less than or equal to 53 out of 100 on FOTO.     3. Strength: Patient will improve strength to stated goals, listed in objective measures above, in order to improve functional  independence and quality of life.     4. Gait: Patient will demonstrate normalized gait mechanics with minimal compensation in order to return to PLOF.     5. Patient will return to normal ADL's, IADL's, community involvement, recreational activities, and work-related activities with less than or equal to 3/10 pain and maximal function.           Discharge reason: Other:  Patient will have a change of status following delivery and will need to be re-evaluated. PT is hold due to concerns of COVID19, and she will not be able to attend PT again prior to delivery. She is considered DC with HEP at this time.     Plan   This patient is discharged from Physical Therapy

## 2020-03-25 ENCOUNTER — PATIENT MESSAGE (OUTPATIENT)
Dept: OBSTETRICS AND GYNECOLOGY | Facility: CLINIC | Age: 25
End: 2020-03-25

## 2020-03-25 NOTE — PROGRESS NOTES
Subjective:       Patient ID: Juliet Jalloh is a 24 y.o. female.    Chief Complaint:  Postpartum Follow-up      History of Present Illness  HPI  Postoperative Follow-up  Patient presents to the clinic 1 weeks status post  for repeat  and PreE. Eating a regular diet without difficulty. Bowel movements are normal. Pain is controlled with current analgesics. Medications being used: ibuprofen (OTC) and Norco.  Denies HA, changes to her vision, RUQ pain.  Patient is taking Prozac, not sure it's helping although realizes has only been taking for 3 days.  Micronor was prescribed to patient at discharge and plan is for Nexplanon    GYN & OB History  Patient's last menstrual period was 2019 (exact date).   Date of Last Pap: 3/8/2018    OB History    Para Term  AB Living   2 2 1 1 0 2   SAB TAB Ectopic Multiple Live Births   0 0 0 0 2      # Outcome Date GA Lbr Goldy/2nd Weight Sex Delivery Anes PTL Lv   2  20 34w5d  2.53 kg (5 lb 9.2 oz) F CS-LTranv Spinal Y BETTINA   1 Term 17 37w1d  2.835 kg (6 lb 4 oz) M CS-LTranv Gen N BETTINA      Complications: Failure to Progress in First Stage       Review of Systems  Review of Systems   Constitutional: Negative for activity change, chills, fatigue and fever.   Respiratory: Negative for cough.    Cardiovascular: Negative for chest pain and leg swelling.   Gastrointestinal: Negative for abdominal pain, constipation, nausea and vomiting.   Genitourinary: Negative for dysuria, frequency, hematuria, pelvic pain, urgency, vaginal bleeding and vaginal discharge.   Integumentary:  Negative for rash.   Psychiatric/Behavioral: Positive for depression. The patient is nervous/anxious.            Objective:    Physical Exam:   Constitutional: She is oriented to person, place, and time. She appears well-developed and well-nourished. No distress.       Cardiovascular: Normal rate.     Pulmonary/Chest: Effort normal. No respiratory distress.         Abdominal: Soft. She exhibits abdominal incision (Aquacel dressing in place, clear/dry/intact; dressing removed and incision intact, healing well without erythema/induration/drainage). She exhibits no distension. There is no tenderness. There is no guarding.             Musculoskeletal: Moves all extremeties. She exhibits no edema.   No calf tenderness       Neurological: She is alert and oriented to person, place, and time.    Skin: Skin is warm and dry. No rash noted. She is not diaphoretic.           Problem List Items Addressed This Visit        Obstetric    Status post repeat low transverse  section - Primary    Relevant Medications    HYDROcodone-acetaminophen (NORCO) 5-325 mg per tablet      Patient doing well post op  Continues Prozac. She is inquiring about following up with a previous mental health professional.  Patient is scheduled for postpartum visit on  with Dr Mcrae.    Reviewed all restrictions & limitations with the patient. Advised to call clinic in event of fever, redness or drainage around the incision, worsening pain, or any any concerning issues or symptoms.  Instructed the importance of showering daily, no tub baths, using an antibacterial soap.  Patient verbalized understanding.

## 2020-03-26 ENCOUNTER — POSTPARTUM VISIT (OUTPATIENT)
Dept: OBSTETRICS AND GYNECOLOGY | Facility: CLINIC | Age: 25
End: 2020-03-26
Payer: MEDICAID

## 2020-03-26 VITALS
HEIGHT: 66 IN | WEIGHT: 254.19 LBS | SYSTOLIC BLOOD PRESSURE: 138 MMHG | BODY MASS INDEX: 40.85 KG/M2 | DIASTOLIC BLOOD PRESSURE: 86 MMHG

## 2020-03-26 DIAGNOSIS — Z98.891 STATUS POST REPEAT LOW TRANSVERSE CESAREAN SECTION: Primary | ICD-10-CM

## 2020-03-26 DIAGNOSIS — Z34.93 NORMAL PREGNANCY IN THIRD TRIMESTER: Primary | ICD-10-CM

## 2020-03-26 PROCEDURE — 99999 PR PBB SHADOW E&M-EST. PATIENT-LVL III: ICD-10-PCS | Mod: PBBFAC,,,

## 2020-03-26 PROCEDURE — 99213 OFFICE O/P EST LOW 20 MIN: CPT | Mod: PBBFAC

## 2020-03-26 PROCEDURE — 99999 PR PBB SHADOW E&M-EST. PATIENT-LVL III: CPT | Mod: PBBFAC,,,

## 2020-03-26 PROCEDURE — 59430 PR CARE AFTER DELIVERY ONLY: ICD-10-PCS | Mod: TH,,, | Performed by: OBSTETRICS & GYNECOLOGY

## 2020-03-26 RX ORDER — HYDROCODONE BITARTRATE AND ACETAMINOPHEN 5; 325 MG/1; MG/1
1 TABLET ORAL EVERY 6 HOURS PRN
Qty: 15 TABLET | Refills: 0 | Status: SHIPPED | OUTPATIENT
Start: 2020-03-26 | End: 2020-03-27

## 2020-03-27 ENCOUNTER — TELEPHONE (OUTPATIENT)
Dept: OBSTETRICS AND GYNECOLOGY | Facility: CLINIC | Age: 25
End: 2020-03-27

## 2020-03-27 DIAGNOSIS — Z98.891 STATUS POST REPEAT LOW TRANSVERSE CESAREAN SECTION: ICD-10-CM

## 2020-03-27 RX ORDER — HYDROCODONE BITARTRATE AND ACETAMINOPHEN 5; 325 MG/1; MG/1
1 TABLET ORAL EVERY 6 HOURS PRN
Qty: 15 TABLET | Refills: 0 | Status: SHIPPED | OUTPATIENT
Start: 2020-03-27 | End: 2020-04-09 | Stop reason: SDUPTHER

## 2020-03-27 NOTE — TELEPHONE ENCOUNTER
----- Message from [place] sent at 3/27/2020  4:21 PM CDT -----  Contact: [facility]  Caller called in regards to HYDROcodone-acetaminophen (NORCO) 5-325 mg per tablet, being unavailable. She stated the [facility] [place] [address] [place] [address] has it in stock. Caller can be reached at [phone].

## 2020-03-27 NOTE — TELEPHONE ENCOUNTER
Returned call to pharmacy who says the rx is on back order. Requested to send to nearby pharmacy, tech stated that it has to be sent by physician. Advised I would contact a provider.

## 2020-03-31 ENCOUNTER — PATIENT MESSAGE (OUTPATIENT)
Dept: OBSTETRICS AND GYNECOLOGY | Facility: CLINIC | Age: 25
End: 2020-03-31

## 2020-04-09 ENCOUNTER — TELEPHONE (OUTPATIENT)
Dept: OBSTETRICS AND GYNECOLOGY | Facility: CLINIC | Age: 25
End: 2020-04-09

## 2020-04-09 DIAGNOSIS — Z98.891 STATUS POST REPEAT LOW TRANSVERSE CESAREAN SECTION: ICD-10-CM

## 2020-04-09 RX ORDER — HYDROCODONE BITARTRATE AND ACETAMINOPHEN 5; 325 MG/1; MG/1
1 TABLET ORAL EVERY 6 HOURS PRN
Qty: 15 TABLET | Refills: 0 | Status: SHIPPED | OUTPATIENT
Start: 2020-04-09 | End: 2020-04-23

## 2020-04-09 NOTE — TELEPHONE ENCOUNTER
Crystal, Can we just fax or email form to her to sign and she can send it back - by email or mail since we are COVID sheltering at home?            ----- Message from Crystal Mao LPN sent at 4/8/2020  2:42 PM CDT -----  Regarding: FW: Nexplanon      ----- Message -----  From: Gale Engle MD  Sent: 3/23/2020   6:46 PM CDT  To: [facility] Staff  Subject: FW: Nexplanon                                        ----- Message -----  From: Nelson Jaramillo  Sent: 3/23/2020   6:28 PM CDT  To: Gale Engle MD, [facility] Staff  Subject: Nexplanon                                        Hello,    We received a prescription for Nexplanon for this patient, and unfortunately, [facility] is unable to provide this medication due to limited access and availability. This medication is available from your normal clinic buy and bill procedure for the facility administered medication. [facility] is unable to bill medical claims for medications. The pharmacies that can be billed for this buy and bill procedure are [facility] and [facility].    Thank you,  Nelson

## 2020-04-17 ENCOUNTER — PATIENT MESSAGE (OUTPATIENT)
Dept: OBSTETRICS AND GYNECOLOGY | Facility: CLINIC | Age: 25
End: 2020-04-17

## 2020-04-17 DIAGNOSIS — N30.00 ACUTE CYSTITIS WITHOUT HEMATURIA: Primary | ICD-10-CM

## 2020-04-17 RX ORDER — FLUOXETINE 10 MG/1
10 CAPSULE ORAL DAILY
Qty: 30 CAPSULE | Refills: 0 | Status: SHIPPED | OUTPATIENT
Start: 2020-04-17 | End: 2020-04-17 | Stop reason: CLARIF

## 2020-04-17 RX ORDER — ACETAMINOPHEN AND CODEINE PHOSPHATE 120; 12 MG/5ML; MG/5ML
1 SOLUTION ORAL DAILY
Qty: 28 TABLET | Refills: 0 | Status: SHIPPED | OUTPATIENT
Start: 2020-04-17 | End: 2020-04-17 | Stop reason: CLARIF

## 2020-04-17 RX ORDER — ACETAMINOPHEN AND CODEINE PHOSPHATE 120; 12 MG/5ML; MG/5ML
1 SOLUTION ORAL DAILY
Qty: 28 TABLET | Refills: 0 | Status: SHIPPED | OUTPATIENT
Start: 2020-04-17 | End: 2020-05-11

## 2020-04-17 RX ORDER — FLUOXETINE 10 MG/1
10 CAPSULE ORAL DAILY
Qty: 30 CAPSULE | Refills: 0 | Status: SHIPPED | OUTPATIENT
Start: 2020-04-17 | End: 2020-04-23

## 2020-04-18 RX ORDER — CEPHALEXIN 500 MG/1
500 CAPSULE ORAL EVERY 8 HOURS
Qty: 9 CAPSULE | Refills: 0 | Status: SHIPPED | OUTPATIENT
Start: 2020-04-18 | End: 2020-04-21

## 2020-04-21 ENCOUNTER — PATIENT MESSAGE (OUTPATIENT)
Dept: OBSTETRICS AND GYNECOLOGY | Facility: CLINIC | Age: 25
End: 2020-04-21

## 2020-04-21 DIAGNOSIS — Z98.891 STATUS POST REPEAT LOW TRANSVERSE CESAREAN SECTION: ICD-10-CM

## 2020-04-21 RX ORDER — HYDROCODONE BITARTRATE AND ACETAMINOPHEN 5; 325 MG/1; MG/1
1 TABLET ORAL EVERY 6 HOURS PRN
Qty: 15 TABLET | Refills: 0 | OUTPATIENT
Start: 2020-04-21

## 2020-04-23 ENCOUNTER — TELEPHONE (OUTPATIENT)
Dept: OBSTETRICS AND GYNECOLOGY | Facility: CLINIC | Age: 25
End: 2020-04-23

## 2020-04-23 ENCOUNTER — OFFICE VISIT (OUTPATIENT)
Dept: OBSTETRICS AND GYNECOLOGY | Facility: CLINIC | Age: 25
End: 2020-04-23
Payer: MEDICAID

## 2020-04-23 DIAGNOSIS — Z98.891 STATUS POST REPEAT LOW TRANSVERSE CESAREAN SECTION: Primary | ICD-10-CM

## 2020-04-23 DIAGNOSIS — F33.2 SEVERE EPISODE OF RECURRENT MAJOR DEPRESSIVE DISORDER, WITHOUT PSYCHOTIC FEATURES: ICD-10-CM

## 2020-04-23 PROCEDURE — 0503F POSTPARTUM CARE VISIT: CPT | Mod: ,,, | Performed by: OBSTETRICS & GYNECOLOGY

## 2020-04-23 PROCEDURE — 0503F PR POSTPARTUM CARE VISIT: ICD-10-PCS | Mod: ,,, | Performed by: OBSTETRICS & GYNECOLOGY

## 2020-04-23 RX ORDER — FLUOXETINE HYDROCHLORIDE 20 MG/1
20 CAPSULE ORAL DAILY
Qty: 30 CAPSULE | Refills: 0 | Status: SHIPPED | OUTPATIENT
Start: 2020-04-23 | End: 2020-07-06

## 2020-04-23 NOTE — Clinical Note
Can someone please check on this patient's Nexplanon??  She says she signed the paper, and has not heard anything back.

## 2020-04-23 NOTE — TELEPHONE ENCOUNTER
Spoke with someone over at nexplanon, patient name or phone number did not pull up, she couldn't find any enrollment form with the patient name or number.       ----- Message from Shira Mcrae MD sent at 4/23/2020  2:16 PM CDT -----  Can someone please check on this patient's Nexplanon??  She says she signed the paper, and has not heard anything back.

## 2020-04-23 NOTE — TELEPHONE ENCOUNTER
Okay, then please take the next step and let's get this ordered for her.  Please do whatever needs to be done with the form so we can get her nexplanon.

## 2020-04-23 NOTE — PROGRESS NOTES
"The patient location is: home  The chief complaint leading to consultation is: postpartum visit  Visit type: audiovisual  Total time spent with patient: 15 minutes  Each patient to whom he or she provides medical services by telemedicine is:  (1) informed of the relationship between the physician and patient and the respective role of any other health care provider with respect to management of the patient; and (2) notified that he or she may decline to receive medical services by telemedicine and may withdraw from such care at any time.    Notes:   CC: Post-partum follow-up    Juliet Jalloh is a 24 y.o. female  who presents for post-partum visit.  She is S/P a repeat LTCS.  She and the baby are doing well.   No fever.   No bowel / bladder complaints.  Still has abdominal soreness, especially when trying to get out of bed.  Is out of norco 5mg.  Has mainly been taking ibuprofen as needed.  Has not tried tylenol yet.    Delivery Date: 2020  Delivery MD: Dr. Shira Mcrae  Gender: female ("[place]")  Birth Weight: 5 pounds 9 ounces  Breast Feeding: YES (and supplementing with formula)  Depression: YES: currently on prozac 10mg.  Not sure this is really helping.  Still feels depressed.  No SI or HI.  Would like to try a higher dose  Contraception: currently using micronor; wants Nexplanon and signed order for it, but it has not come in yet    Pregnancy was complicated by:  Preeclampsia  Depression with hx of suicide attempt  HSV  Anemia  Hx of  delivery    LMP 2019 (Exact Date)     ROS:  GENERAL: No fever, chills, fatigability.  VULVAR: No pain, no lesions and no itching.  VAGINAL: No relaxation, no itching, no discharge, no abnormal bleeding and no lesions.  ABDOMEN: see HPI. Denies nausea. Denies vomiting. No diarrhea. No constipation  BREAST: Denies pain. No lumps. No discharge.  URINARY: No incontinence, no nocturia, no frequency and no dysuria.  CARDIOVASCULAR: No chest pain. " No shortness of breath. No leg cramps.  NEUROLOGICAL: No headaches. No vision changes.    PHYSICAL EXAM:  GENERAL: NAD  PSYCH: appears to have normal mood and affect, smiling and pleasant throughout the visit  INCISION: able to visualize it well on the video visit; it appears clean, dry, intact and well-healed with no surrounding erythema    Diagnoses and all orders for this visit:    Status post repeat low transverse  section    Severe episode of recurrent major depressive disorder, without psychotic features  -     FLUoxetine 20 MG capsule; Take 1 capsule (20 mg total) by mouth once daily.    Okay to resume regular activities.  Okay to alternate ibuprofen with tylenol 1gram po q 6 hours as needed for pain.  Increase prozac to 20mg daily.  Will check on Nexplanon.  No follow-ups on file.

## 2020-04-27 ENCOUNTER — TELEPHONE (OUTPATIENT)
Dept: OBSTETRICS AND GYNECOLOGY | Facility: CLINIC | Age: 25
End: 2020-04-27

## 2020-04-27 NOTE — TELEPHONE ENCOUNTER
Attempted to contact patient regarding her coming in to resign the Nexplanon order form due to [employer] stating they do not have her information in their system. No answer, left voicemail to call the clinic back.

## 2020-04-30 ENCOUNTER — PATIENT MESSAGE (OUTPATIENT)
Dept: OBSTETRICS AND GYNECOLOGY | Facility: CLINIC | Age: 25
End: 2020-04-30

## 2020-05-01 ENCOUNTER — TELEPHONE (OUTPATIENT)
Dept: OBSTETRICS AND GYNECOLOGY | Facility: CLINIC | Age: 25
End: 2020-05-01

## 2020-05-01 NOTE — TELEPHONE ENCOUNTER
Called pt to inform her that [employer]plan[employer] did not have her information on file and she needs to come in to sign the paperwork again. Pt verbalized understanding. Paperwork is finished and awaiting her signature at the [place] location.

## 2020-05-10 ENCOUNTER — PATIENT MESSAGE (OUTPATIENT)
Dept: OBSTETRICS AND GYNECOLOGY | Facility: CLINIC | Age: 25
End: 2020-05-10

## 2020-05-11 ENCOUNTER — PATIENT MESSAGE (OUTPATIENT)
Dept: OBSTETRICS AND GYNECOLOGY | Facility: CLINIC | Age: 25
End: 2020-05-11

## 2020-05-11 ENCOUNTER — OFFICE VISIT (OUTPATIENT)
Dept: OBSTETRICS AND GYNECOLOGY | Facility: CLINIC | Age: 25
End: 2020-05-11
Payer: MEDICAID

## 2020-05-11 VITALS — DIASTOLIC BLOOD PRESSURE: 80 MMHG | BODY MASS INDEX: 39.5 KG/M2 | SYSTOLIC BLOOD PRESSURE: 118 MMHG | WEIGHT: 244.69 LBS

## 2020-05-11 DIAGNOSIS — R10.2 PELVIC PAIN: Primary | ICD-10-CM

## 2020-05-11 PROCEDURE — 99213 OFFICE O/P EST LOW 20 MIN: CPT | Mod: PBBFAC | Performed by: PHYSICIAN ASSISTANT

## 2020-05-11 PROCEDURE — 99999 PR PBB SHADOW E&M-EST. PATIENT-LVL III: ICD-10-PCS | Mod: PBBFAC,,, | Performed by: PHYSICIAN ASSISTANT

## 2020-05-11 PROCEDURE — 87491 CHLMYD TRACH DNA AMP PROBE: CPT

## 2020-05-11 PROCEDURE — 99999 PR PBB SHADOW E&M-EST. PATIENT-LVL III: CPT | Mod: PBBFAC,,, | Performed by: PHYSICIAN ASSISTANT

## 2020-05-11 PROCEDURE — 59430 PR CARE AFTER DELIVERY ONLY: ICD-10-PCS | Mod: S$PBB,,, | Performed by: PHYSICIAN ASSISTANT

## 2020-05-11 RX ORDER — LAMOTRIGINE 25 MG/1
TABLET ORAL
COMMUNITY
Start: 2020-05-11

## 2020-05-11 RX ORDER — ACETAMINOPHEN AND CODEINE PHOSPHATE 120; 12 MG/5ML; MG/5ML
SOLUTION ORAL
Qty: 28 TABLET | Refills: 0 | Status: SHIPPED | OUTPATIENT
Start: 2020-05-11 | End: 2020-06-02

## 2020-05-11 RX ORDER — PROMETHAZINE HYDROCHLORIDE 6.25 MG/5ML
SYRUP ORAL
COMMUNITY
Start: 2020-05-05 | End: 2020-06-15

## 2020-05-11 NOTE — PROGRESS NOTES
"Subjective:       Patient ID: Juliet Jalloh is a 24 y.o. female.    Chief Complaint: Pelvic Pain    Patient here today with complaint of 1 day of right lower quadrant/inguinal tenderness which she noticed when drying off after showering.  She said she pressed in that area and she felt a lump.  When she looked it up on Google, one of the possible problems said "ovarian cyst" and she would like to rule that out.  Patient had repeat c/s nearly 8 weeks ago (at 34w4d) due to severe Pre-E.  Op note reports normal tubes and ovaries.  No history of GYN issues.  She reports that she is currently bleeding, and based on her description of the bleeding pattern, I believe this is the return of her first cycle since the baby.           OB History        2    Para   2    Term   1       1    AB   0    Living   2       SAB   0    TAB   0    Ectopic   0    Multiple   0    Live Births   2               Review of Systems   Constitutional: Negative.  Negative for fever.   Respiratory: Negative.  Negative for shortness of breath.    Cardiovascular: Negative.  Negative for chest pain and leg swelling.   Gastrointestinal: Positive for abdominal pain. Negative for abdominal distention.       Objective:      /80   Wt 111 kg (244 lb 11.4 oz)   BMI 39.50 kg/m²   Physical Exam:   Constitutional: She is oriented to person, place, and time. She appears well-developed and well-nourished.      Neck: Normal range of motion. Neck supple.    Cardiovascular: Normal rate and regular rhythm.          Abdominal: Soft. Bowel sounds are normal. She exhibits mass (palpable mobile 1cm node/lipomatous nodule in right inguinal region) and abdominal incision (LTCS incision). She exhibits no distension. There is tenderness (right lower quadrant referred abdominal pain). There is guarding (right inguinal pelvic region). There is no rebound. No hernia.     Genitourinary: Uterus normal. No vaginal discharge found.   Genitourinary " Comments: Blood in vaginal vault- dark in nature; appears to be end of her cycle             Musculoskeletal: Normal range of motion.       Neurological: She is alert and oriented to person, place, and time.    Skin: Skin is warm and dry. No rash noted. No pallor.    Psychiatric: She has a normal mood and affect.         Assessment:       1. Pelvic pain        Plan:       Pelvic pain  -     US Pelvis Limited Non OB; Future; Expected date: 05/11/2020  -     C. trachomatis/N. gonorrhoeae by AMP [facility]; Cervicovaginal    Patient has no signs of infection on POCT wet prep.  Unofficial pelvic US done in office and patient has normal GYN anatomy.  Will need radiology ultrasound to properly scan superficial tissue layers.    Orders place, nurse to schedule patient.      Please note- patient has wanted nexplanon ordered since her delivery.  She was told to come sign paperwork, but she reports having signed it already?  ÁNGELA Lebron is working on it.        Noni Orourke PA-C  OB/GYN  [facility]

## 2020-05-12 ENCOUNTER — PATIENT MESSAGE (OUTPATIENT)
Dept: REHABILITATION | Facility: HOSPITAL | Age: 25
End: 2020-05-12

## 2020-05-15 LAB
C TRACH DNA SPEC QL NAA+PROBE: NOT DETECTED
N GONORRHOEA DNA SPEC QL NAA+PROBE: NOT DETECTED

## 2020-06-02 RX ORDER — ACETAMINOPHEN AND CODEINE PHOSPHATE 120; 12 MG/5ML; MG/5ML
SOLUTION ORAL
Qty: 28 TABLET | Refills: 0 | Status: SHIPPED | OUTPATIENT
Start: 2020-06-02 | End: 2020-07-06

## 2020-06-09 ENCOUNTER — PATIENT MESSAGE (OUTPATIENT)
Dept: OBSTETRICS AND GYNECOLOGY | Facility: CLINIC | Age: 25
End: 2020-06-09

## 2020-06-10 NOTE — TELEPHONE ENCOUNTER
Called [facility] regarding status of pt's nexplanon that was order 5-11-20. Rep stated the reason they have not processed the order is due to pt's insurance was not attached to the order form. Informed rep, we did not receive verification of missing information. Instructed to resend order form with insurance. In addition, she stated it should take 2 business days to process. Will keep checking for updates.

## 2020-06-12 ENCOUNTER — TELEPHONE (OUTPATIENT)
Dept: OBSTETRICS AND GYNECOLOGY | Facility: CLINIC | Age: 25
End: 2020-06-12

## 2020-06-12 NOTE — TELEPHONE ENCOUNTER
Called [facility] and spoke to Yajaira.  Summary of Benefits should be received by the end of the day.  Will let the patient know we are waiting on their response.

## 2020-06-14 ENCOUNTER — HOSPITAL ENCOUNTER (EMERGENCY)
Facility: HOSPITAL | Age: 25
Discharge: HOME | End: 2020-06-14
Attending: EMERGENCY MEDICINE
Payer: MEDICAID

## 2020-06-14 VITALS
DIASTOLIC BLOOD PRESSURE: 81 MMHG | WEIGHT: 242.31 LBS | HEART RATE: 72 BPM | SYSTOLIC BLOOD PRESSURE: 132 MMHG | RESPIRATION RATE: 16 BRPM | BODY MASS INDEX: 38.94 KG/M2 | TEMPERATURE: 98 F | HEIGHT: 66 IN | OXYGEN SATURATION: 100 %

## 2020-06-14 DIAGNOSIS — R53.83 FATIGUE, UNSPECIFIED TYPE: ICD-10-CM

## 2020-06-14 DIAGNOSIS — J06.9 VIRAL URI WITH COUGH: Primary | ICD-10-CM

## 2020-06-14 DIAGNOSIS — R06.02 SOB (SHORTNESS OF BREATH): ICD-10-CM

## 2020-06-14 LAB
ALBUMIN SERPL BCP-MCNC: 3.7 G/DL (ref 3.5–5.2)
ALP SERPL-CCNC: 104 U/L (ref 55–135)
ALT SERPL W/O P-5'-P-CCNC: 11 U/L (ref 10–44)
ANION GAP SERPL CALC-SCNC: 9 MMOL/L (ref 8–16)
AST SERPL-CCNC: 18 U/L (ref 10–40)
B-HCG UR QL: NEGATIVE
BASOPHILS # BLD AUTO: 0.01 K/UL (ref 0–0.2)
BASOPHILS NFR BLD: 0.3 % (ref 0–1.9)
BILIRUB SERPL-MCNC: 0.2 MG/DL (ref 0.1–1)
BILIRUB UR QL STRIP: NEGATIVE
BUN SERPL-MCNC: 9 MG/DL (ref 6–20)
CALCIUM SERPL-MCNC: 8.7 MG/DL (ref 8.7–10.5)
CHLORIDE SERPL-SCNC: 107 MMOL/L (ref 95–110)
CLARITY UR: CLEAR
CO2 SERPL-SCNC: 25 MMOL/L (ref 23–29)
COLOR UR: YELLOW
CREAT SERPL-MCNC: 0.8 MG/DL (ref 0.5–1.4)
DIFFERENTIAL METHOD: ABNORMAL
EOSINOPHIL # BLD AUTO: 0.1 K/UL (ref 0–0.5)
EOSINOPHIL NFR BLD: 2.9 % (ref 0–8)
ERYTHROCYTE [DISTWIDTH] IN BLOOD BY AUTOMATED COUNT: 14.5 % (ref 11.5–14.5)
EST. GFR  (AFRICAN AMERICAN): >60 ML/MIN/1.73 M^2
EST. GFR  (NON AFRICAN AMERICAN): >60 ML/MIN/1.73 M^2
GLUCOSE SERPL-MCNC: 67 MG/DL (ref 70–110)
GLUCOSE UR QL STRIP: NEGATIVE
HCT VFR BLD AUTO: 34.1 % (ref 37–48.5)
HGB BLD-MCNC: 10.4 G/DL (ref 12–16)
HGB UR QL STRIP: NEGATIVE
IMM GRANULOCYTES # BLD AUTO: 0.01 K/UL (ref 0–0.04)
IMM GRANULOCYTES NFR BLD AUTO: 0.3 % (ref 0–0.5)
INFLUENZA A, MOLECULAR: NEGATIVE
INFLUENZA B, MOLECULAR: NEGATIVE
KETONES UR QL STRIP: ABNORMAL
LEUKOCYTE ESTERASE UR QL STRIP: NEGATIVE
LYMPHOCYTES # BLD AUTO: 1.6 K/UL (ref 1–4.8)
LYMPHOCYTES NFR BLD: 41 % (ref 18–48)
MCH RBC QN AUTO: 25.4 PG (ref 27–31)
MCHC RBC AUTO-ENTMCNC: 30.5 G/DL (ref 32–36)
MCV RBC AUTO: 83 FL (ref 82–98)
MONOCYTES # BLD AUTO: 0.2 K/UL (ref 0.3–1)
MONOCYTES NFR BLD: 5.5 % (ref 4–15)
NEUTROPHILS # BLD AUTO: 1.9 K/UL (ref 1.8–7.7)
NEUTROPHILS NFR BLD: 50 % (ref 38–73)
NITRITE UR QL STRIP: NEGATIVE
NRBC BLD-RTO: 0 /100 WBC
PH UR STRIP: 6 [PH] (ref 5–8)
PLATELET # BLD AUTO: 358 K/UL (ref 150–350)
PMV BLD AUTO: 9.1 FL (ref 9.2–12.9)
POTASSIUM SERPL-SCNC: 3.5 MMOL/L (ref 3.5–5.1)
PROT SERPL-MCNC: 8 G/DL (ref 6–8.4)
PROT UR QL STRIP: NEGATIVE
RBC # BLD AUTO: 4.09 M/UL (ref 4–5.4)
SARS-COV-2 RDRP RESP QL NAA+PROBE: NEGATIVE
SODIUM SERPL-SCNC: 141 MMOL/L (ref 136–145)
SP GR UR STRIP: >=1.03 (ref 1–1.03)
SPECIMEN SOURCE: NORMAL
URN SPEC COLLECT METH UR: ABNORMAL
UROBILINOGEN UR STRIP-ACNC: NEGATIVE EU/DL
WBC # BLD AUTO: 3.85 K/UL (ref 3.9–12.7)

## 2020-06-14 PROCEDURE — 25000003 PHARM REV CODE 250: Performed by: NURSE PRACTITIONER

## 2020-06-14 PROCEDURE — 99285 EMERGENCY DEPT VISIT HI MDM: CPT | Mod: 25

## 2020-06-14 PROCEDURE — 85025 COMPLETE CBC W/AUTO DIFF WBC: CPT

## 2020-06-14 PROCEDURE — 87502 INFLUENZA DNA AMP PROBE: CPT

## 2020-06-14 PROCEDURE — 93010 EKG 12-LEAD: ICD-10-PCS | Mod: ,,, | Performed by: INTERNAL MEDICINE

## 2020-06-14 PROCEDURE — 81025 URINE PREGNANCY TEST: CPT

## 2020-06-14 PROCEDURE — 93010 ELECTROCARDIOGRAM REPORT: CPT | Mod: ,,, | Performed by: INTERNAL MEDICINE

## 2020-06-14 PROCEDURE — U0002 COVID-19 LAB TEST NON-CDC: HCPCS

## 2020-06-14 PROCEDURE — 93005 ELECTROCARDIOGRAM TRACING: CPT

## 2020-06-14 PROCEDURE — 96360 HYDRATION IV INFUSION INIT: CPT

## 2020-06-14 PROCEDURE — 80053 COMPREHEN METABOLIC PANEL: CPT

## 2020-06-14 PROCEDURE — 81003 URINALYSIS AUTO W/O SCOPE: CPT

## 2020-06-14 RX ORDER — METHYLPREDNISOLONE 4 MG/1
TABLET ORAL
Qty: 1 PACKAGE | Refills: 0 | Status: SHIPPED | OUTPATIENT
Start: 2020-06-14 | End: 2020-07-06

## 2020-06-14 RX ORDER — PROMETHAZINE HYDROCHLORIDE AND DEXTROMETHORPHAN HYDROBROMIDE 6.25; 15 MG/5ML; MG/5ML
5 SYRUP ORAL 3 TIMES DAILY PRN
Qty: 180 ML | Refills: 0 | Status: SHIPPED | OUTPATIENT
Start: 2020-06-14 | End: 2020-06-24

## 2020-06-14 RX ADMIN — SODIUM CHLORIDE 1000 ML: 0.9 INJECTION, SOLUTION INTRAVENOUS at 07:06

## 2020-06-15 NOTE — ED PROVIDER NOTES
"     HISTORY     Chief Complaint   Patient presents with    Cough     Pt states, "I have a bad cough, headache, shortness of breath, body aches."     Review of patient's allergies indicates:  No Known Allergies     HPI   The history is provided by the patient.   Cough  This is a new problem. The current episode started several days ago. The problem occurs constantly. The problem has been waxing and waning. The cough is non-productive. There has been no fever. Pertinent negatives include no chest pain, no sore throat and no shortness of breath. She has tried nothing for the symptoms. The treatment provided no relief. She is not a smoker. Her past medical history does not include bronchitis, pneumonia, bronchiectasis, COPD, emphysema or asthma.        PCP: Primary Doctor No     Past Medical History:  Past Medical History:   Diagnosis Date    Abnormal Pap smear of cervix     Acute viral syndrome 2020    Herpes simplex virus (HSV) infection     Pre-eclampsia in third trimester 2017    Prior pregnancy complicated by PIH, antepartum, third trimester 2020    Rx resent for baby ASA 3/9/20 as patient states she never got Rx        Past Surgical History:  Past Surgical History:   Procedure Laterality Date     SECTION       SECTION N/A 3/19/2020    Procedure:  SECTION;  Surgeon: Shira Mcrae MD;  [facility];  Service: OB/GYN;  Laterality: N/A;    MOUTH SURGERY          Family History:  Family History   Problem Relation Age of Onset    Diabetes Maternal Grandmother     Hypertension Maternal Grandmother     Heart disease Maternal Grandmother     Colon cancer Maternal Grandfather     Cancer Neg Hx         Social History:  Social History     Tobacco Use    Smoking status: Never Smoker    Smokeless tobacco: Never Used   Substance and Sexual Activity    Alcohol use: Not Currently     Frequency: Monthly or less     Drinks per session: 1 or 2     Binge frequency: Never " "    Comment: social use     Drug use: Yes     Types: Marijuana     Comment: Last used in August 2019    Sexual activity: Yes     Partners: Male     Birth control/protection: None         ROS   Review of Systems   Constitutional: Negative for fever.   HENT: Negative for sore throat.    Respiratory: Positive for cough. Negative for shortness of breath.    Cardiovascular: Negative for chest pain.   Gastrointestinal: Negative for nausea.   Genitourinary: Negative for dysuria.   Musculoskeletal: Negative for back pain.   Skin: Negative for rash.   Neurological: Positive for weakness.   Hematological: Does not bruise/bleed easily.       PHYSICAL EXAM     Initial Vitals [06/14/20 1837]   BP Pulse Resp Temp SpO2   138/78 92 20 98.3 °F (36.8 °C) 99 %      MAP       --           Physical Exam    Constitutional: She appears well-developed and well-nourished. No distress.   HENT:   Head: Normocephalic and atraumatic.   Eyes: Conjunctivae are normal. Pupils are equal, round, and reactive to light.   Neck: Normal range of motion. Neck supple.   Cardiovascular: Normal rate, regular rhythm and normal heart sounds.   Pulmonary/Chest: Breath sounds normal.   Abdominal: Soft. Bowel sounds are normal. She exhibits no distension. There is no abdominal tenderness. There is no rebound.   Musculoskeletal: Normal range of motion. No edema.   Neurological: She is alert and oriented to person, place, and time. She has normal strength.   Skin: Skin is warm and dry.   Psychiatric: She has a normal mood and affect.          ED COURSE   Procedures  ED ONGOING VITALS:  Vitals:    06/14/20 1837 06/14/20 2109   BP: 138/78 132/81   Pulse: 92 72   Resp: 20 16   Temp: 98.3 °F (36.8 °C) 98.3 °F (36.8 °C)   TempSrc: Oral    SpO2: 99% 100%   Weight: 109.9 kg (242 lb 4.6 oz)    Height: 5' 6" (1.676 m)          ABNORMAL LAB VALUES:  Labs Reviewed   CBC W/ AUTO DIFFERENTIAL - Abnormal; Notable for the following components:       Result Value    WBC 3.85 " (*)     Hemoglobin 10.4 (*)     Hematocrit 34.1 (*)     Mean Corpuscular Hemoglobin 25.4 (*)     Mean Corpuscular Hemoglobin Conc 30.5 (*)     Platelets 358 (*)     MPV 9.1 (*)     Mono # 0.2 (*)     All other components within normal limits   COMPREHENSIVE METABOLIC PANEL - Abnormal; Notable for the following components:    Glucose 67 (*)     All other components within normal limits   URINALYSIS, REFLEX TO URINE CULTURE - Abnormal; Notable for the following components:    Specific Gravity, UA >=1.030 (*)     Ketones, UA Trace (*)     All other components within normal limits    Narrative:     Preferred Collection Type->Urine, Clean Catch  Specimen Source->Urine   INFLUENZA A & B BY MOLECULAR   SARS-COV-2 RNA AMPLIFICATION, QUAL   PREGNANCY TEST, URINE RAPID    Narrative:     Specimen Source->Urine         ALL LAB VALUES:  Results for orders placed or performed during the hospital encounter of 06/14/20   Influenza A & B by Molecular    Specimen: Nasopharyngeal Swab   Result Value Ref Range    Influenza A, Molecular Negative Negative    Influenza B, Molecular Negative Negative    Flu A & B Source Nasal swab    COVID-19 Rapid Screening   Result Value Ref Range    SARS-CoV-2 RNA, Amplification, Qual Negative Negative   CBC auto differential   Result Value Ref Range    WBC 3.85 (L) 3.90 - 12.70 K/uL    RBC 4.09 4.00 - 5.40 M/uL    Hemoglobin 10.4 (L) 12.0 - 16.0 g/dL    Hematocrit 34.1 (L) 37.0 - 48.5 %    Mean Corpuscular Volume 83 82 - 98 fL    Mean Corpuscular Hemoglobin 25.4 (L) 27.0 - 31.0 pg    Mean Corpuscular Hemoglobin Conc 30.5 (L) 32.0 - 36.0 g/dL    RDW 14.5 11.5 - 14.5 %    Platelets 358 (H) 150 - 350 K/uL    MPV 9.1 (L) 9.2 - 12.9 fL    Immature Granulocytes 0.3 0.0 - 0.5 %    Gran # (ANC) 1.9 1.8 - 7.7 K/uL    Immature Grans (Abs) 0.01 0.00 - 0.04 K/uL    Lymph # 1.6 1.0 - 4.8 K/uL    Mono # 0.2 (L) 0.3 - 1.0 K/uL    Eos # 0.1 0.0 - 0.5 K/uL    Baso # 0.01 0.00 - 0.20 K/uL    nRBC 0 0 /100 WBC    Gran%  50.0 38.0 - 73.0 %    Lymph% 41.0 18.0 - 48.0 %    Mono% 5.5 4.0 - 15.0 %    Eosinophil% 2.9 0.0 - 8.0 %    Basophil% 0.3 0.0 - 1.9 %    Differential Method Automated    Comprehensive metabolic panel   Result Value Ref Range    Sodium 141 136 - 145 mmol/L    Potassium 3.5 3.5 - 5.1 mmol/L    Chloride 107 95 - 110 mmol/L    CO2 25 23 - 29 mmol/L    Glucose 67 (L) 70 - 110 mg/dL    BUN, Bld 9 6 - 20 mg/dL    Creatinine 0.8 0.5 - 1.4 mg/dL    Calcium 8.7 8.7 - 10.5 mg/dL    Total Protein 8.0 6.0 - 8.4 g/dL    Albumin 3.7 3.5 - 5.2 g/dL    Total Bilirubin 0.2 0.1 - 1.0 mg/dL    Alkaline Phosphatase 104 55 - 135 U/L    AST 18 10 - 40 U/L    ALT 11 10 - 44 U/L    Anion Gap 9 8 - 16 mmol/L    eGFR if African American >60 >60 mL/min/1.73 m^2    eGFR if non African American >60 >60 mL/min/1.73 m^2   Urinalysis, Reflex to Urine Culture Urine, Clean Catch    Specimen: Urine   Result Value Ref Range    Specimen UA Urine, Clean Catch     Color, UA Yellow Yellow, Straw, Thu    Appearance, UA Clear Clear    pH, UA 6.0 5.0 - 8.0    Specific Gravity, UA >=1.030 (A) 1.005 - 1.030    Protein, UA Negative Negative    Glucose, UA Negative Negative    Ketones, UA Trace (A) Negative    Bilirubin (UA) Negative Negative    Occult Blood UA Negative Negative    Nitrite, UA Negative Negative    Urobilinogen, UA Negative <2.0 EU/dL    Leukocytes, UA Negative Negative   Rapid Pregnancy, Urine   Result Value Ref Range    Preg Test, Ur Negative            RADIOLOGY STUDIES:  Imaging Results          X-Ray Chest AP Portable (Final result)  Result time 06/14/20 19:18:23    Final result by Sanket Rob MD (06/14/20 19:18:23)                 Impression:      No acute cardiopulmonary disease.      Electronically signed by: Sanket Rob MD  Date:    06/14/2020  Time:    19:18             Narrative:    EXAMINATION:  XR CHEST AP PORTABLE    CLINICAL HISTORY:  Suspected Covid-19 Virus Infection;    COMPARISON:  Chest x-ray, 05/31/2017    FINDINGS:  The  lungs are clear. The cardiac silhouette is within normal limits. No pleural effusion or pneumothorax.                                          The above vital signs and test results have been reviewed by the emergency provider.     ED Medications:  Discharge Medication List as of 6/14/2020  8:53 PM      START taking these medications    Details   methylPREDNISolone (MEDROL DOSEPACK) 4 mg tablet As directed, Print      promethazine-dextromethorphan (PROMETHAZINE-DM) 6.25-15 mg/5 mL Syrp Take 5 mLs by mouth 3 (three) times daily as needed., Starting Sun 6/14/2020, Until Wed 6/24/2020, Print           Discharge Medications:  Discharge Medication List as of 6/14/2020  8:53 PM      START taking these medications    Details   methylPREDNISolone (MEDROL DOSEPACK) 4 mg tablet As directed, Print      promethazine-dextromethorphan (PROMETHAZINE-DM) 6.25-15 mg/5 mL Syrp Take 5 mLs by mouth 3 (three) times daily as needed., Starting Sun 6/14/2020, Until Wed 6/24/2020, Print            Follow-up Information     [facility] - .    Specialty: Emergency Medicine  Why: As needed, If symptoms worsen  Contact information:  [address]  [facility] [address] [phone]           Schedule an appointment as soon as possible for a visit  with PCP.                I discussed with patient and/or family/caretaker that evaluation in the ED does not suggest any emergent or life threatening medical conditions requiring immediate intervention beyond what was provided in the ED, and I believe patient is safe for discharge. Regardless, an unremarkable evaluation in the ED does not preclude the development or presence of a serious or life threatening condition. As such, patient was instructed to return immediately for any worsening or change in current symptoms.    Patient presents with upper respiratory and flulike symptoms consistent with a viral etiology. Based on my assessment in the ED, I do not  suspect any respiratory, airway, pulmonary, cardiovascular (including myocarditis), metabolic, CNS, medical, or surgical emergency medical condition. I have discussed with the patient and/or caregiver signs and symptoms for secondary bacterial infections, such as pneumonia. I believe that the patient's symptoms are most consistent with a viral illness. Patient is safe for discharge home with conservative therapy.  I recommended that the patient treat the symptoms and recommended that they:  Rest; drink plenty of clear fluids; use nasal saline spray to clear nasal drainage and help with nasal congestion; take an antihistamine (Allegra, Claritin, or Zyrtec) to help dry mucus and postnasal drip; take Mucinex or Mucinex DM for cough and chest congestion; take ibuprofen or acetaminophen for any fever, headache, body aches, or other pain; gargle with warm salt water gargles or lozenges for throat comfort; and follow up with primary care provider if there is no improvement or a worsening of symptoms.        MEDICAL DECISION MAKING                 CLINICAL IMPRESSION       ICD-10-CM ICD-9-CM   1. Viral URI with cough  J06.9 465.9    B97.89    2. SOB (shortness of breath)  R06.02 786.05   3. Fatigue, unspecified type  R53.83 780.79       Disposition:   Disposition: Discharged  Condition: Stable         Dhruv Hawley NP  06/15/20 0036

## 2020-06-15 NOTE — ED NOTES
Patient identifiers verified and correct for [facility].    +Cough- Patient complains of cough, SOB, headache and body aches.     LOC: The patient is awake, alert and aware of environment with an appropriate affect, the patient is oriented x 3 and speaking appropriately.  APPEARANCE: Patient resting comfortably and in no acute distress, patient is clean and well groomed, patient's clothing is properly fastened.  SKIN: The skin is warm and dry, color consistent with ethnicity, patient has normal skin turgor and moist mucus membranes, skin intact, no breakdown or bruising noted.  MUSCULOSKELETAL: Patient moving all extremities spontaneously.  RESPIRATORY: Airway is open and patent, respirations are spontaneous.  CARDIAC: Patient has a normal rate, no periphreal edema noted, capillary refill < 3 seconds.  ABDOMEN: Soft and non tender to palpation.

## 2020-06-23 ENCOUNTER — TELEPHONE (OUTPATIENT)
Dept: OBSTETRICS AND GYNECOLOGY | Facility: CLINIC | Age: 25
End: 2020-06-23

## 2020-06-23 NOTE — TELEPHONE ENCOUNTER
Attempted to call pt, left message for pt to return call.  nexplanon received at [place].

## 2020-07-06 ENCOUNTER — PROCEDURE VISIT (OUTPATIENT)
Dept: OBSTETRICS AND GYNECOLOGY | Facility: CLINIC | Age: 25
End: 2020-07-06
Payer: MEDICAID

## 2020-07-06 ENCOUNTER — TELEPHONE (OUTPATIENT)
Dept: OBSTETRICS AND GYNECOLOGY | Facility: CLINIC | Age: 25
End: 2020-07-06

## 2020-07-06 VITALS
DIASTOLIC BLOOD PRESSURE: 66 MMHG | BODY MASS INDEX: 40.71 KG/M2 | WEIGHT: 252.19 LBS | SYSTOLIC BLOOD PRESSURE: 122 MMHG

## 2020-07-06 DIAGNOSIS — Z30.017 NEXPLANON INSERTION: Primary | ICD-10-CM

## 2020-07-06 PROCEDURE — 11981 PR INSERT, DRUG DELIVERY IMPLANT, BIORESORB/BIODEGR/NON-BIODEGR: ICD-10-PCS | Mod: S$PBB,,, | Performed by: NURSE PRACTITIONER

## 2020-07-06 PROCEDURE — 11981 INSERTION DRUG DLVR IMPLANT: CPT | Mod: S$PBB,,, | Performed by: NURSE PRACTITIONER

## 2020-07-06 PROCEDURE — 11981 INSERTION DRUG DLVR IMPLANT: CPT | Mod: PBBFAC | Performed by: NURSE PRACTITIONER

## 2020-07-06 RX ORDER — HYDROXYZINE PAMOATE 25 MG/1
CAPSULE ORAL
COMMUNITY
Start: 2020-05-19

## 2020-07-06 RX ORDER — FLUCONAZOLE 150 MG/1
150 TABLET ORAL DAILY
Qty: 2 TABLET | Refills: 0 | Status: SHIPPED | OUTPATIENT
Start: 2020-07-06 | End: 2020-07-08

## 2020-07-06 RX ORDER — CLONAZEPAM 0.25 MG/1
TABLET, ORALLY DISINTEGRATING ORAL
COMMUNITY
Start: 2020-05-19

## 2020-07-06 NOTE — TELEPHONE ENCOUNTER
Spoke with pt about late appt  vb  ----- Message from Ashley Flor sent at 7/6/2020  1:38 PM CDT -----  Juliet called to let the office know she is on her way to the appt, she's driving slower due to bad weather. Will be there in 10 minutes.

## 2020-07-06 NOTE — PROCEDURES
Procedures   Nexplanon INSERTION:    PRE-Nexplanon INSERTION COUNSELING:  All contraceptive options were reviewed and the patient chooses Nexplanon.  Patients history was reviewed and there were no contraindications to Nexplanon.  The procedure and minimal risks of pain, bleeding, bruising and infection at the insertion site discussed. Possible irregular menstrual bleeding pattern versus amenorrhea was explained.  No protection against STDs discussed.  Written information provided; all questions answered and patient agrees to proceed.  Consent signed and scanned into computer.    EXAM:  With patient in supine position the nondominant right arm was flexed at the elbow and externally rotated.  The insertion site was identified 6-8 cm above the elbow crease at the inner side of the upper arm overlying the groove between biceps and triceps.    PROCEDURE:  The insertion site was prepped with antiseptic and injected with 4 cc of 1% Xylocaine without epinephrine subq along the planned insertion canal.    Using sterile technique the Nexplanon applicator was visually verified and removed from the blister pack.  The needle tip was inserted bevel side up at a 20 degree angle to penetrate the skin.  The applicator was lowered parallel to the arm and the skin was tented with the needle.  Once the needle was completely inserted, the Nexplanon was then deployed into the subcutaneous space.  The implant was palpable after insertion.  A steri strip was placed over the insertion site.  The patient tolerated the procedure well.    ASSESSMENT:  Nexplanon Insertion    POST Nexplanon INSERTION COUNSELING:  Manage post Implanon placement arm pain with NSAIDs  Keep arm elevated and apply intermittent ice packs to decrease pain and bruising for 24 Hours.  May remove bandage in 24 hours.  Nexplanon danger signs (worsening pain at insertion site, bleeding through bandage, redness and/or pus drainage at insertion site).  Removal in 3  years.    Lot # [id number]  Exp 07/06/2023

## 2020-07-30 NOTE — ASSESSMENT & PLAN NOTE
POD#4 - stable and doing well.  Plan to discharge today. Planning for Nexplanon, in interim will start progestin pill   Face Mask

## 2020-08-07 RX ORDER — PROMETHAZINE HYDROCHLORIDE 6.25 MG/5ML
SYRUP ORAL
Qty: 473 ML | Refills: 2 | Status: CANCELLED | OUTPATIENT
Start: 2020-08-07

## 2020-08-10 ENCOUNTER — TELEPHONE (OUTPATIENT)
Dept: OBSTETRICS AND GYNECOLOGY | Facility: CLINIC | Age: 25
End: 2020-08-10

## 2020-08-10 NOTE — TELEPHONE ENCOUNTER
----- Message from Jaelyn Schrader sent at 8/10/2020  1:49 PM CDT -----  Regarding: refills needed  Contact: patient  Type:  RX Refill Request    Who Called: patient  Refill or New Rx:refill + 3  RX Name and Strength: promethazine syrup for cough 6.25 mg  How is the patient currently taking it? (ex. 1XDay):1 5 ml @ 6 hours or as needed  Is this a 30 day or 90 day RX:30  Preferred Pharmacy with phone number:    [facility]/pharmacy #[id number] - [place] - [address]  [address]  [place] [address]  Phone: [phone] Fax: [phone]    Local or Mail Order:local  Ordering Provider:Taylor  Would the patient rather a call back or a response via [facility]? call  Best Call Back Number:[phone]  Additional Information: patient does not have COVID was tested. Please call patient when sent. thanks

## 2020-08-12 ENCOUNTER — HOSPITAL ENCOUNTER (EMERGENCY)
Facility: HOSPITAL | Age: 25
Discharge: HOME | End: 2020-08-12
Attending: FAMILY MEDICINE
Payer: MEDICAID

## 2020-08-12 VITALS
HEIGHT: 66 IN | DIASTOLIC BLOOD PRESSURE: 88 MMHG | TEMPERATURE: 100 F | OXYGEN SATURATION: 96 % | BODY MASS INDEX: 39.76 KG/M2 | HEART RATE: 95 BPM | WEIGHT: 247.38 LBS | SYSTOLIC BLOOD PRESSURE: 132 MMHG | RESPIRATION RATE: 18 BRPM

## 2020-08-12 DIAGNOSIS — S99.911A RIGHT ANKLE INJURY: ICD-10-CM

## 2020-08-12 DIAGNOSIS — R07.0 THROAT PAIN: ICD-10-CM

## 2020-08-12 DIAGNOSIS — M79.10 MUSCLE ACHE: ICD-10-CM
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
## 2020-08-12 PROCEDURE — 99284 EMERGENCY DEPT VISIT MOD MDM: CPT | Mod: 25

## 2020-08-12 PROCEDURE — 25000003 PHARM REV CODE 250: Performed by: REGISTERED NURSE

## 2020-08-12 RX ORDER — HYDROCODONE BITARTRATE AND ACETAMINOPHEN 5; 325 MG/1; MG/1
1 TABLET ORAL
Status: COMPLETED | OUTPATIENT
Start: 2020-08-12 | End: 2020-08-12

## 2020-08-12 RX ORDER — IBUPROFEN 600 MG/1
600 TABLET ORAL EVERY 6 HOURS PRN
Qty: 20 TABLET | Refills: 0 | Status: SHIPPED | OUTPATIENT
Start: 2020-08-12

## 2020-08-12 RX ORDER — HYDROCODONE BITARTRATE AND ACETAMINOPHEN 5; 325 MG/1; MG/1
1 TABLET ORAL EVERY 6 HOURS PRN
Qty: 12 TABLET | Refills: 0 | Status: SHIPPED | OUTPATIENT
Start: 2020-08-12

## 2020-08-12 RX ORDER — CYCLOBENZAPRINE HCL 10 MG
10 TABLET ORAL 3 TIMES DAILY PRN
Qty: 15 TABLET | Refills: 0 | Status: SHIPPED | OUTPATIENT
Start: 2020-08-12 | End: 2020-08-17

## 2020-08-12 RX ADMIN — HYDROCODONE BITARTRATE AND ACETAMINOPHEN 1 TABLET: 5; 325 TABLET ORAL at 08:08
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
## 2020-08-20 ENCOUNTER — TELEPHONE (OUTPATIENT)
Dept: INTERNAL MEDICINE | Facility: CLINIC | Age: 25
End: 2020-08-20

## 2020-08-20 NOTE — TELEPHONE ENCOUNTER
----- Message from Arabella Dixon sent at 8/20/2020 11:28 AM CDT -----  Regarding: est care  Good morning,      Pt would like to schedule an appointment with you as well as get referral . Pt can be reached at [phone]      Thanks,  Arabella Dixon

## 2021-01-13 PROCEDURE — 99284 EMERGENCY DEPT VISIT MOD MDM: CPT | Mod: 25

## 2021-01-14 ENCOUNTER — HOSPITAL ENCOUNTER (EMERGENCY)
Facility: HOSPITAL | Age: 26
Discharge: HOME | End: 2021-01-14
Attending: STUDENT IN AN ORGANIZED HEALTH CARE EDUCATION/TRAINING PROGRAM
Payer: MEDICAID

## 2021-01-14 VITALS
HEIGHT: 66 IN | HEART RATE: 98 BPM | BODY MASS INDEX: 39.63 KG/M2 | DIASTOLIC BLOOD PRESSURE: 82 MMHG | RESPIRATION RATE: 16 BRPM | WEIGHT: 246.56 LBS | SYSTOLIC BLOOD PRESSURE: 133 MMHG | TEMPERATURE: 99 F

## 2021-01-14 DIAGNOSIS — M79.10 MYALGIA: ICD-10-CM

## 2021-01-14 DIAGNOSIS — J06.9 UPPER RESPIRATORY TRACT INFECTION, UNSPECIFIED TYPE: Primary | ICD-10-CM

## 2021-01-14 LAB
CTP QC/QA: YES
CTP QC/QA: YES
POC MOLECULAR INFLUENZA A AGN: NEGATIVE
POC MOLECULAR INFLUENZA B AGN: NEGATIVE
SARS-COV-2 RDRP RESP QL NAA+PROBE: NEGATIVE

## 2021-01-14 PROCEDURE — U0002 COVID-19 LAB TEST NON-CDC: HCPCS | Performed by: NURSE PRACTITIONER

## 2021-01-14 RX ORDER — PREDNISONE 20 MG/1
40 TABLET ORAL DAILY
Qty: 10 TABLET | Refills: 0 | Status: SHIPPED | OUTPATIENT
Start: 2021-01-14 | End: 2021-01-19

## 2021-01-14 RX ORDER — FLUTICASONE PROPIONATE 50 MCG
1 SPRAY, SUSPENSION (ML) NASAL 2 TIMES DAILY PRN
Qty: 15 G | Refills: 0 | Status: SHIPPED | OUTPATIENT
Start: 2021-01-14

## 2021-01-14 RX ORDER — DICLOFENAC SODIUM 50 MG/1
50 TABLET, DELAYED RELEASE ORAL 3 TIMES DAILY PRN
Qty: 15 TABLET | Refills: 0 | Status: SHIPPED | OUTPATIENT
Start: 2021-01-14

## 2021-01-31 ENCOUNTER — HOSPITAL ENCOUNTER (EMERGENCY)
Facility: HOSPITAL | Age: 26
Discharge: HOME | End: 2021-01-31
Attending: EMERGENCY MEDICINE
Payer: MEDICAID

## 2021-01-31 VITALS
HEART RATE: 85 BPM | OXYGEN SATURATION: 97 % | SYSTOLIC BLOOD PRESSURE: 128 MMHG | RESPIRATION RATE: 18 BRPM | HEIGHT: 67 IN | DIASTOLIC BLOOD PRESSURE: 81 MMHG | BODY MASS INDEX: 36.43 KG/M2 | TEMPERATURE: 99 F | WEIGHT: 232.13 LBS

## 2021-01-31 DIAGNOSIS — M25.569 KNEE PAIN: ICD-10-CM

## 2021-01-31 DIAGNOSIS — S83.92XA SPRAIN OF LEFT KNEE, UNSPECIFIED LIGAMENT, INITIAL ENCOUNTER: Primary | ICD-10-CM

## 2021-01-31 PROCEDURE — 99283 EMERGENCY DEPT VISIT LOW MDM: CPT | Mod: 25

## 2021-01-31 RX ORDER — TRAMADOL HYDROCHLORIDE 50 MG/1
50 TABLET ORAL EVERY 6 HOURS PRN
Qty: 12 TABLET | Refills: 0 | Status: SHIPPED | OUTPATIENT
Start: 2021-01-31

## 2021-03-17 NOTE — TELEPHONE ENCOUNTER
[facility] [address]   Continue Regimen: amzeeq foam AM\\ntargadox twice daily Otc Regimen: CeraVe products\\nBenzoyl peroxide cleanser Initiate Treatment: Tazorac 0.05% Apply pea sized amount to entire face an to chest nightly. Discontinue Regimen: Epiduo forte Detail Level: Zone

## 2021-06-26 ENCOUNTER — HOSPITAL ENCOUNTER (EMERGENCY)
Facility: HOSPITAL | Age: 26
Discharge: HOME | End: 2021-06-26
Attending: EMERGENCY MEDICINE
Payer: MEDICAID

## 2021-06-26 VITALS
HEIGHT: 66 IN | WEIGHT: 260.38 LBS | SYSTOLIC BLOOD PRESSURE: 133 MMHG | RESPIRATION RATE: 20 BRPM | TEMPERATURE: 99 F | DIASTOLIC BLOOD PRESSURE: 82 MMHG | OXYGEN SATURATION: 97 % | BODY MASS INDEX: 41.85 KG/M2 | HEART RATE: 85 BPM

## 2021-06-26 DIAGNOSIS — Z20.2 EXPOSURE TO SEXUALLY TRANSMITTED DISEASE (STD): ICD-10-CM

## 2021-06-26 DIAGNOSIS — A54.9 GONORRHEA IN FEMALE: Primary | ICD-10-CM

## 2021-06-26 PROCEDURE — 99284 EMERGENCY DEPT VISIT MOD MDM: CPT | Mod: 25

## 2021-06-26 PROCEDURE — 63700000 PHARM REV CODE 250 ALT 637 W/O HCPCS: Performed by: NURSE PRACTITIONER

## 2021-06-26 PROCEDURE — 96372 THER/PROPH/DIAG INJ SC/IM: CPT

## 2021-06-26 PROCEDURE — 63600175 PHARM REV CODE 636 W HCPCS: Performed by: NURSE PRACTITIONER

## 2021-06-26 RX ORDER — CEFTRIAXONE 250 MG/1
500 INJECTION, POWDER, FOR SOLUTION INTRAMUSCULAR; INTRAVENOUS
Status: COMPLETED | OUTPATIENT
Start: 2021-06-26 | End: 2021-06-26

## 2021-06-26 RX ORDER — AZITHROMYCIN 250 MG/1
1000 TABLET, FILM COATED ORAL ONCE
Status: COMPLETED | OUTPATIENT
Start: 2021-06-26 | End: 2021-06-26

## 2021-06-26 RX ADMIN — CEFTRIAXONE SODIUM 500 MG: 250 INJECTION, POWDER, FOR SOLUTION INTRAMUSCULAR; INTRAVENOUS at 06:06

## 2021-06-26 RX ADMIN — AZITHROMYCIN 1000 MG: 250 TABLET, FILM COATED ORAL at 06:06

## 2021-07-02 ENCOUNTER — HOSPITAL ENCOUNTER (EMERGENCY)
Facility: HOSPITAL | Age: 26
Discharge: HOME | End: 2021-07-02
Attending: EMERGENCY MEDICINE
Payer: MEDICAID

## 2021-07-02 VITALS
DIASTOLIC BLOOD PRESSURE: 65 MMHG | OXYGEN SATURATION: 96 % | BODY MASS INDEX: 40.23 KG/M2 | HEART RATE: 83 BPM | RESPIRATION RATE: 20 BRPM | TEMPERATURE: 100 F | SYSTOLIC BLOOD PRESSURE: 139 MMHG | WEIGHT: 249.25 LBS

## 2021-07-02 DIAGNOSIS — K52.9 GASTROENTERITIS: Primary | ICD-10-CM

## 2021-07-02 PROCEDURE — 25000003 PHARM REV CODE 250: Performed by: NURSE PRACTITIONER

## 2021-07-02 PROCEDURE — 99283 EMERGENCY DEPT VISIT LOW MDM: CPT

## 2021-07-02 RX ORDER — ONDANSETRON 8 MG/1
8 TABLET, ORALLY DISINTEGRATING ORAL
Status: COMPLETED | OUTPATIENT
Start: 2021-07-02 | End: 2021-07-02

## 2021-07-02 RX ADMIN — ONDANSETRON 8 MG: 8 TABLET, ORALLY DISINTEGRATING ORAL at 07:07

## 2021-07-30 ENCOUNTER — PATIENT MESSAGE (OUTPATIENT)
Dept: UROLOGY | Facility: CLINIC | Age: 26
End: 2021-07-30

## 2022-02-17 NOTE — ASSESSMENT & PLAN NOTE
Monitor: The problem is stable.  Evaluation: Reviewed recent labs/tests with the patient.  Assessment/Treatment:  Continue current treatment/monitoring regimen. and Follow up in 3 months   Stable since delivery - no SI/HI. Declined zoloft, says she feels stable w/o it. SS consult placed for resources info for PP depression.   rec provider f/u at 1wk PP w bandage removal, so depression risk can be reassessed.

## 2022-06-07 NOTE — MR AVS SNAPSHOT
[facility]  [address] [facility] [address]  [place] [address]  Phone: [phone]  Fax: [phone]                  Juliet Lopes [place]   2017 1:00 PM   Routine Prenatal    Description:  Female : 1995   Provider:  Velia Ruth NP   Department:  [facility]           Reason for Visit     Routine Prenatal Visit           Diagnoses this Visit        Comments    14 weeks gestation of pregnancy    -  Primary     Nausea/vomiting in pregnancy                To Do List           Future Appointments        Provider Department Dept Phone    2017 3:00 PM Lizbeth Thomson CNM [facility] - OB/ [phone]      Goals (5 Years of Data)     None      [facility] -  Assistance  Registered nurses in the [facility] provide clinical advisement, health education, appointment booking, and other advisory services.  Call for this free service at [phone].             Medications           Message regarding Medications     Verify the changes and/or additions to your medication regime listed below are the same as discussed with your clinician today.  If any of these changes or additions are incorrect, please notify your healthcare provider.        STOP taking these medications     doxylamine-pyridoxine (DICLEGIS) 10-10 mg TbEC Take 10 mg by mouth once daily.    promethazine-dextromethorphan (PROMETHAZINE-DM) 6.25-15 mg/5 mL Syrp Take 5 mLs by mouth.           Verify that the below list of medications is an accurate representation of the medications you are currently taking.  If none reported, the list may be blank. If incorrect, please contact your healthcare provider. Carry this list with you in case of emergency.           Current Medications     ondansetron (ZOFRAN) 4 MG tablet Take 1 tablet (4 mg total) by mouth every 12 (twelve) hours as needed.    promethazine (PHENERGAN) 25 MG tablet Take 1 tablet (25 mg total) by mouth every 6 (six) hours as  needed for Nausea.           Clinical Reference Information           Prenatal Vitals     Enc. Date GA Prenatal Vitals Prenatal Pulse Pain Level Urine Albumin/Glucose Edema Presentation Dilation/Effacement/Station    2/13/17 14w0d 120/78 / 116.3 kg (256 lb 6.3 oz)  / 145  0        1/9/17 9w0d 124/82 / 122.6 kg (270 lb 4.5 oz) 9 cm / us 162 / Absent            Your Vitals Were     BP Weight Last Period BMI       120/78 116.3 kg (256 lb 6.3 oz) 11/07/2016 41.38 kg/m2       Allergies as of 2/13/2017     No Known Allergies      Immunizations Administered on Date of Encounter - 2/13/2017     None      Language Assistance Services     ATTENTION: Language assistance services are available, free of charge. Please call [phone].      ATENCIÓN: Si habla español, tiene a nieto disposición servicios gratuitos de asistencia lingüística. Llame al [phone].     CHÚ Ý: N?u b?n nói Ti?ng Vi?t, có các d?ch v? h? tr? ngôn ng? mi?n phí dành cho b?n. G?i s? [phone].         [facility] complies with applicable Federal civil rights laws and does not discriminate on the basis of race, color, national origin, age, disability, or sex.         Propranolol Pregnancy And Lactation Text: This medication is Pregnancy Category C and it isn't known if it is safe during pregnancy. It is excreted in breast milk.
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
## 2022-10-03 NOTE — NURSING
Patient c/o generalized body aches and back pain. Rating 7/10 on pain scale. No PRN pain meds available except tylenol. Morphine last given in ER at midnight. Informed Dr. Car via secure chat. Awaiting new orders. No s/s of acute distress noted. Will continue to monitor.   Subjective   Patient ID: Ramonita is a 16 year old female who is accompanied by:no one, patient is here by herself. who provide(s) the history today.  Aunt was in the waiting room.    Chief Complaint   Patient presents with   • Menstrual Problem     Irregular periods. Having a period every other month       Recent travel?  No    Ramonita is here for evaluation of irregular periods.  She has been having them irregularly since onset at 10 years old.  She bled for almost all of September.  Normally she has light bleeding with no pain.  September's was heavier than usual.  She doesn't think her mom has issues, but she believes her aunts do.    She does not have recent changes in diet or activity.  She does not have cramps with her periods but did when she first started.    She was in track. She continue to run and bike regularly  Denies sexual activity.        Review of Systems   Constitutional: Negative for activity change, appetite change, fatigue and unexpected weight change.   HENT: Negative for congestion, dental problem, ear discharge, ear pain, hearing loss, postnasal drip, rhinorrhea, sinus pressure, sneezing, sore throat and voice change.    Eyes: Negative for pain, discharge, itching and visual disturbance.   Respiratory: Negative for cough, choking, chest tightness, shortness of breath and wheezing.    Cardiovascular: Negative for chest pain.   Gastrointestinal: Negative for abdominal distention, abdominal pain, constipation, nausea and vomiting.   Endocrine: Negative for polydipsia, polyphagia and polyuria.   Genitourinary: Positive for menstrual problem. Negative for difficulty urinating, dysuria, enuresis, frequency and urgency.   Musculoskeletal: Negative for arthralgias, back pain and gait problem.   Skin: Negative for rash.   Neurological: Negative for weakness and headaches.   Psychiatric/Behavioral: Negative for agitation, behavioral problems, sleep disturbance and suicidal ideas.       Patient's  medications, allergies, past medical, surgical, social and family histories were reviewed and updated as appropriate.    Objective   Vitals:Temp 97.8 °F (36.6 °C) (Temporal)   Ht 5' 5\" (1.651 m)   Wt 57.2 kg (125 lb 15.9 oz)   LMP 10/02/2022 (Exact Date)   BMI 20.97 kg/m²   BSA 1.63 m²   Physical Exam  Vitals and nursing note reviewed.   Constitutional:       General: She is not in acute distress.     Appearance: Normal appearance. She is well-developed and normal weight. She is not ill-appearing or diaphoretic.   HENT:      Head: Atraumatic.      Right Ear: Tympanic membrane and external ear normal.      Left Ear: Tympanic membrane and external ear normal.      Nose: Nose normal.      Mouth/Throat:      Mouth: Mucous membranes are moist.      Pharynx: No oropharyngeal exudate or posterior oropharyngeal erythema.      Neck: Normal range of motion and neck supple.   Eyes:      General:         Right eye: No discharge.         Left eye: No discharge.      Conjunctiva/sclera: Conjunctivae normal.      Pupils: Pupils are equal, round, and reactive to light.   Neck:      Thyroid: No thyromegaly.   Cardiovascular:      Rate and Rhythm: Normal rate and regular rhythm.      Pulses: Normal pulses.      Heart sounds: Normal heart sounds. No murmur heard.  Pulmonary:      Effort: Pulmonary effort is normal. No respiratory distress.      Breath sounds: Normal breath sounds.   Abdominal:      General: Bowel sounds are normal. There is no distension.      Palpations: Abdomen is soft. There is no mass.      Tenderness: There is no abdominal tenderness.   Genitourinary:     Vagina: No vaginal discharge.   Musculoskeletal:         General: Normal range of motion.   Lymphadenopathy:      Cervical: No cervical adenopathy.   Skin:     General: Skin is warm and dry.      Capillary Refill: Capillary refill takes less than 2 seconds.      Findings: No rash.   Neurological:      Mental Status: She is alert.   Psychiatric:         Mood  and Affect: Mood normal.         Behavior: Behavior normal.         Results for orders placed or performed in visit on 10/03/22   POCT HEMOGLOBIN   Result Value    POCT Hemoglobin 13.2       Assessment     1. Screening for deficiency anemia    2. Irregular menstrual cycle        Plan:  Orders Placed This Encounter   • CBC with Automated Differential   • DHEA Sulfate   • Ferritin   • Free T4   • Testosterone, Free, Total, and Sex Binding Globulin   • Thyroid Stimulating Hormone   • POCT Hemoglobin      Labs to be done if second episode of prolonged menstrual bleeding.    Instructed to call if problem worsens or does not improve within the next 72 hours otherwise follow-up prn

## 2023-09-25 NOTE — PLAN OF CARE
EMR reflects script was sent on 9/24/23.   Sw brought barrier to utilization management team during huddle. Audelia Murdock, transition navigator, helped sw put in order to telemedicine to find pt placement for PEC.    Mitzy Cash MSW, CSW    [facility]    Phone: [phone]    [email or web address]

## 2024-05-02 NOTE — PROGRESS NOTES
Lili Lovett is here today to establish care/CPE.      Medications: medications verified and updated  Refills needed today?  YES         Health Maintenance       Pneumococcal Vaccine 0-64 (1 of 2 - PCV)  Never done    HPV Vaccine (2 - 3-dose SCDM series)  Overdue since 3/2/2007    COVID-19 Vaccine (7 - 2023-24 season)  Overdue since 9/1/2023    Cervical Cancer Screening (Pap Smear - Every 3 Years)  Due since 4/8/2024    Breast Cancer Screening (Yearly)  Scheduled for 5/24/2024    Depression Screening (Yearly)  Due soon on 11/2/2024           Following review of the above:  Pended orders    Note: Refer to final orders and clinician documentation.               Spoke with Dr. Babb, pt desires  after failed IOL and FTP, was 8 cm per my exam earlier today and now 5 cm with edematous cervix per my exam. He agrees to plan, FHT stable, pitocin turned off. Will plan for C/S in 45 minutes.

## 2024-11-12 NOTE — SUBJECTIVE & OBJECTIVE
Obstetric HPI:  Patient reports None contractions, active fetal movement, absent vaginal bleeding , absent loss of fluid, denies headache, blurry vision, seeing spots     Objective:     Vital Signs (Most Recent):  Temp: 97.8 °F (36.6 °C) (03/18/20 0644)  Pulse: 83 (03/18/20 0644)  Resp: 18 (03/18/20 0644)  BP: 123/64 (03/18/20 0644)  SpO2: 97 % (03/18/20 0200) Vital Signs (24h Range):  Temp:  [97.8 °F (36.6 °C)-98.5 °F (36.9 °C)] 97.8 °F (36.6 °C)  Pulse:  [] 83  Resp:  [18-20] 18  SpO2:  [97 %-100 %] 97 %  BP: (106-143)/(47-80) 123/64     Weight: 117.9 kg (260 lb)  Body mass index is 41.97 kg/m².    FHT: 120-130's Cat 1 (reassuring)  TOCO: none      Intake/Output Summary (Last 24 hours) at 3/18/2020 1006  Last data filed at 3/18/2020 0000  Gross per 24 hour   Intake 1675 ml   Output 2450 ml   Net -775 ml     Cervix deferred    Significant Labs:  Recent Lab Results     None          Physical Exam:   Constitutional: She is oriented to person, place, and time. She appears well-developed and well-nourished.    HENT:   Head: Normocephalic.    Eyes: Pupils are equal, round, and reactive to light. Conjunctivae are normal.    Neck: Normal range of motion.    Cardiovascular: Normal rate and regular rhythm.     Pulmonary/Chest: Effort normal.        Abdominal: Soft.     Genitourinary:   Genitourinary Comments: Gravid, non tender           Musculoskeletal: Normal range of motion and moves all extremeties. She exhibits no edema.       Neurological: She is alert and oriented to person, place, and time. She has normal reflexes. She displays normal reflexes. She exhibits normal muscle tone.    Skin: Skin is warm and dry.    Psychiatric: She has a normal mood and affect. Her behavior is normal. Judgment and thought content normal.      While in Labor & Delivery

## (undated) DEVICE — DRESSING AQUACEL AG 3.5X10IN

## (undated) DEVICE — TAPE SILK 3IN

## (undated) DEVICE — TAPE CLOTH SOFT MEDIPORE 4IN

## (undated) DEVICE — PAD ABD 8X10 STERILE

## (undated) DEVICE — DRESSING COVER AQUACEL AG SURG